# Patient Record
Sex: FEMALE | Employment: OTHER | URBAN - METROPOLITAN AREA
[De-identification: names, ages, dates, MRNs, and addresses within clinical notes are randomized per-mention and may not be internally consistent; named-entity substitution may affect disease eponyms.]

---

## 2017-01-17 ENCOUNTER — HOSPITAL ENCOUNTER (OUTPATIENT)
Dept: LAB | Age: 82
Discharge: HOME | End: 2017-01-17

## 2017-01-17 LAB
APPEARANCE UR: ABNORMAL
BILIRUB UR QL: ABNORMAL
COLOR UR: ABNORMAL
GLUCOSE UR STRIP.AUTO-MCNC: NEGATIVE MG/DL
HGB UR QL STRIP: NEGATIVE
KETONES UR QL STRIP.AUTO: ABNORMAL MG/DL
LEUKOCYTE ESTERASE UR QL STRIP.AUTO: NEGATIVE
NITRITE UR QL STRIP.AUTO: NEGATIVE
PH UR STRIP: 5 [PH] (ref 5–9)
PROT UR STRIP-MCNC: ABNORMAL MG/DL
SP GR UR REFRACTOMETRY: 1.03 (ref 1–1.02)
UROBILINOGEN UR QL STRIP.AUTO: 1 EU/DL (ref 0.2–1)

## 2017-01-17 PROCEDURE — 81003 URINALYSIS AUTO W/O SCOPE: CPT | Performed by: INTERNAL MEDICINE

## 2017-01-30 ENCOUNTER — APPOINTMENT (OUTPATIENT)
Dept: GENERAL RADIOLOGY | Age: 82
End: 2017-01-30
Attending: EMERGENCY MEDICINE
Payer: MEDICARE

## 2017-01-30 ENCOUNTER — HOSPITAL ENCOUNTER (OUTPATIENT)
Dept: ULTRASOUND IMAGING | Age: 82
Discharge: HOME | End: 2017-01-30
Attending: INTERNAL MEDICINE
Payer: MEDICARE

## 2017-01-30 ENCOUNTER — HOSPITAL ENCOUNTER (EMERGENCY)
Age: 82
Discharge: HOME | End: 2017-01-30
Attending: EMERGENCY MEDICINE
Payer: MEDICARE

## 2017-01-30 VITALS
TEMPERATURE: 98 F | HEIGHT: 64 IN | SYSTOLIC BLOOD PRESSURE: 159 MMHG | RESPIRATION RATE: 16 BRPM | HEART RATE: 97 BPM | WEIGHT: 136 LBS | BODY MASS INDEX: 23.22 KG/M2 | OXYGEN SATURATION: 95 % | DIASTOLIC BLOOD PRESSURE: 73 MMHG

## 2017-01-30 DIAGNOSIS — R11.2 NAUSEA & VOMITING: ICD-10-CM

## 2017-01-30 DIAGNOSIS — N39.0 URINARY TRACT INFECTION, SITE UNSPECIFIED: ICD-10-CM

## 2017-01-30 DIAGNOSIS — R11.2 NAUSEA AND VOMITING, INTRACTABILITY OF VOMITING NOT SPECIFIED, UNSPECIFIED VOMITING TYPE: ICD-10-CM

## 2017-01-30 DIAGNOSIS — R10.9 UNSPECIFIED ABDOMINAL PAIN: ICD-10-CM

## 2017-01-30 DIAGNOSIS — R53.1 GENERALIZED WEAKNESS: Primary | ICD-10-CM

## 2017-01-30 LAB
ALBUMIN SERPL BCP-MCNC: 2.8 G/DL (ref 3.2–4.6)
ALBUMIN/GLOB SERPL: 0.7 {RATIO} (ref 1.2–3.5)
ALP SERPL-CCNC: 78 U/L (ref 50–136)
ALT SERPL-CCNC: 9 U/L (ref 12–65)
ANION GAP BLD CALC-SCNC: 12 MMOL/L (ref 7–16)
APPEARANCE UR: ABNORMAL
AST SERPL W P-5'-P-CCNC: 7 U/L (ref 15–37)
ATRIAL RATE: 93 BPM
BACTERIA URNS QL MICRO: ABNORMAL /HPF
BASOPHILS # BLD AUTO: 0 K/UL (ref 0–0.2)
BASOPHILS # BLD: 0 % (ref 0–2)
BILIRUB SERPL-MCNC: 0.6 MG/DL (ref 0.2–1.1)
BILIRUB UR QL: ABNORMAL
BNP SERPL-MCNC: 34 PG/ML
BUN SERPL-MCNC: 20 MG/DL (ref 8–23)
CALCIUM SERPL-MCNC: 8.4 MG/DL (ref 8.3–10.4)
CALCULATED P AXIS, ECG09: 75 DEGREES
CALCULATED R AXIS, ECG10: -46 DEGREES
CALCULATED T AXIS, ECG11: 31 DEGREES
CASTS URNS QL MICRO: ABNORMAL /LPF
CHLORIDE SERPL-SCNC: 99 MMOL/L (ref 98–107)
CO2 SERPL-SCNC: 27 MMOL/L (ref 21–32)
COLOR UR: ABNORMAL
CREAT SERPL-MCNC: 1.05 MG/DL (ref 0.6–1)
DIAGNOSIS, 93000: NORMAL
DIASTOLIC BP, ECG02: NORMAL MMHG
DIFFERENTIAL METHOD BLD: ABNORMAL
EOSINOPHIL # BLD: 0.1 K/UL (ref 0–0.8)
EOSINOPHIL NFR BLD: 1 % (ref 0.5–7.8)
EPI CELLS #/AREA URNS HPF: 0 /HPF
ERYTHROCYTE [DISTWIDTH] IN BLOOD BY AUTOMATED COUNT: 12.9 % (ref 11.9–14.6)
GLOBULIN SER CALC-MCNC: 3.8 G/DL (ref 2.3–3.5)
GLUCOSE SERPL-MCNC: 157 MG/DL (ref 65–100)
GLUCOSE UR STRIP.AUTO-MCNC: NEGATIVE MG/DL
HCT VFR BLD AUTO: 41.1 % (ref 35.8–46.3)
HGB BLD-MCNC: 13.8 G/DL (ref 11.7–15.4)
HGB UR QL STRIP: ABNORMAL
IMM GRANULOCYTES # BLD: 0.1 K/UL (ref 0–0.5)
IMM GRANULOCYTES NFR BLD AUTO: 0.5 % (ref 0–5)
KETONES UR QL STRIP.AUTO: NEGATIVE MG/DL
LEUKOCYTE ESTERASE UR QL STRIP.AUTO: ABNORMAL
LYMPHOCYTES # BLD AUTO: 12 % (ref 13–44)
LYMPHOCYTES # BLD: 1.1 K/UL (ref 0.5–4.6)
MCH RBC QN AUTO: 31.6 PG (ref 26.1–32.9)
MCHC RBC AUTO-ENTMCNC: 33.6 G/DL (ref 31.4–35)
MCV RBC AUTO: 94.1 FL (ref 79.6–97.8)
MONOCYTES # BLD: 1.1 K/UL (ref 0.1–1.3)
MONOCYTES NFR BLD AUTO: 11 % (ref 4–12)
NEUTS SEG # BLD: 7.1 K/UL (ref 1.7–8.2)
NEUTS SEG NFR BLD AUTO: 76 % (ref 43–78)
NITRITE UR QL STRIP.AUTO: NEGATIVE
P-R INTERVAL, ECG05: 160 MS
PH UR STRIP: 5 [PH] (ref 5–9)
PLATELET # BLD AUTO: 293 K/UL (ref 150–450)
PMV BLD AUTO: 10.6 FL (ref 10.8–14.1)
POTASSIUM SERPL-SCNC: 3.2 MMOL/L (ref 3.5–5.1)
PROT SERPL-MCNC: 6.6 G/DL (ref 6.3–8.2)
PROT UR STRIP-MCNC: 30 MG/DL
Q-T INTERVAL, ECG07: 326 MS
QRS DURATION, ECG06: 68 MS
QTC CALCULATION (BEZET), ECG08: 405 MS
RBC # BLD AUTO: 4.37 M/UL (ref 4.05–5.25)
RBC #/AREA URNS HPF: ABNORMAL /HPF
SODIUM SERPL-SCNC: 138 MMOL/L (ref 136–145)
SP GR UR REFRACTOMETRY: 1.02 (ref 1–1.02)
SYSTOLIC BP, ECG01: NORMAL MMHG
TROPONIN I SERPL-MCNC: 0.03 NG/ML (ref 0.02–0.05)
TSH SERPL DL<=0.005 MIU/L-ACNC: 1.83 UIU/ML (ref 0.36–3.74)
UROBILINOGEN UR QL STRIP.AUTO: 0.2 EU/DL (ref 0.2–1)
VENTRICULAR RATE, ECG03: 93 BPM
WBC # BLD AUTO: 9.4 K/UL (ref 4.3–11.1)
WBC URNS QL MICRO: >100 /HPF

## 2017-01-30 PROCEDURE — 96361 HYDRATE IV INFUSION ADD-ON: CPT | Performed by: EMERGENCY MEDICINE

## 2017-01-30 PROCEDURE — 87086 URINE CULTURE/COLONY COUNT: CPT | Performed by: EMERGENCY MEDICINE

## 2017-01-30 PROCEDURE — 80053 COMPREHEN METABOLIC PANEL: CPT | Performed by: EMERGENCY MEDICINE

## 2017-01-30 PROCEDURE — 84484 ASSAY OF TROPONIN QUANT: CPT | Performed by: EMERGENCY MEDICINE

## 2017-01-30 PROCEDURE — 81001 URINALYSIS AUTO W/SCOPE: CPT | Performed by: EMERGENCY MEDICINE

## 2017-01-30 PROCEDURE — 96365 THER/PROPH/DIAG IV INF INIT: CPT | Performed by: EMERGENCY MEDICINE

## 2017-01-30 PROCEDURE — 93005 ELECTROCARDIOGRAM TRACING: CPT | Performed by: EMERGENCY MEDICINE

## 2017-01-30 PROCEDURE — 74011250636 HC RX REV CODE- 250/636: Performed by: EMERGENCY MEDICINE

## 2017-01-30 PROCEDURE — 84443 ASSAY THYROID STIM HORMONE: CPT | Performed by: EMERGENCY MEDICINE

## 2017-01-30 PROCEDURE — 74011000258 HC RX REV CODE- 258: Performed by: EMERGENCY MEDICINE

## 2017-01-30 PROCEDURE — 96375 TX/PRO/DX INJ NEW DRUG ADDON: CPT | Performed by: EMERGENCY MEDICINE

## 2017-01-30 PROCEDURE — 85025 COMPLETE CBC W/AUTO DIFF WBC: CPT | Performed by: EMERGENCY MEDICINE

## 2017-01-30 PROCEDURE — 99284 EMERGENCY DEPT VISIT MOD MDM: CPT | Performed by: EMERGENCY MEDICINE

## 2017-01-30 PROCEDURE — 71020 XR CHEST PA LAT: CPT

## 2017-01-30 PROCEDURE — 83880 ASSAY OF NATRIURETIC PEPTIDE: CPT | Performed by: EMERGENCY MEDICINE

## 2017-01-30 RX ORDER — METOCLOPRAMIDE HYDROCHLORIDE 5 MG/ML
5 INJECTION INTRAMUSCULAR; INTRAVENOUS
Status: COMPLETED | OUTPATIENT
Start: 2017-01-30 | End: 2017-01-30

## 2017-01-30 RX ORDER — SODIUM CHLORIDE 9 MG/ML
150 INJECTION, SOLUTION INTRAVENOUS CONTINUOUS
Status: DISCONTINUED | OUTPATIENT
Start: 2017-01-30 | End: 2017-01-30 | Stop reason: HOSPADM

## 2017-01-30 RX ORDER — METOCLOPRAMIDE 5 MG/1
5 TABLET ORAL
Qty: 20 TAB | Refills: 0 | Status: SHIPPED | OUTPATIENT
Start: 2017-01-30 | End: 2021-01-13

## 2017-01-30 RX ORDER — NITROFURANTOIN 25; 75 MG/1; MG/1
100 CAPSULE ORAL 2 TIMES DAILY
Qty: 6 CAP | Refills: 0 | Status: SHIPPED | OUTPATIENT
Start: 2017-01-30 | End: 2017-02-02

## 2017-01-30 RX ADMIN — METOCLOPRAMIDE 5 MG: 5 INJECTION, SOLUTION INTRAMUSCULAR; INTRAVENOUS at 16:59

## 2017-01-30 RX ADMIN — CEFTRIAXONE SODIUM 1 G: 1 INJECTION, POWDER, FOR SOLUTION INTRAMUSCULAR; INTRAVENOUS at 16:59

## 2017-01-30 RX ADMIN — SODIUM CHLORIDE 150 ML/HR: 900 INJECTION, SOLUTION INTRAVENOUS at 14:20

## 2017-01-30 NOTE — ED PROVIDER NOTES
HPI Comments: Patient was brought to return for evaluation due to increasing generalized weakness and persistent nausea and vomiting. Patient is currently in a rehabilitation facility after recent hospitalization. She has been treated for urinary tract infection the past.  And also been seen by gastroenterology for the nausea and vomiting and possible gallbladder issues. She had a gallbladder ultrasound performed today as an outpatient but due to her symptoms was brought to the emergency department. Patient is a 80 y.o. female presenting with vomiting. The history is provided by the patient and a relative. Vomiting    This is a recurrent problem. The current episode started more than 1 week ago. The problem occurs 2 to 4 times per day. The problem has been gradually worsening. The emesis has an appearance of stomach contents. There has been no fever. Pertinent negatives include no chills, no fever, no sweats, no abdominal pain, no diarrhea, no headaches, no arthralgias, no myalgias, no cough, no URI and no headaches. Risk factors include new medication. Her pertinent negatives include no irritable bowel syndrome, no inflammatory bowel disease, no short gut syndrome, no bowel resection, no recent abdominal surgery, no malabsorption, no gastric bypass and no DM.         Past Medical History:   Diagnosis Date    Arrhythmia      by history/ not at present    CAD (coronary artery disease)      mitral valve prolapse    Chronic pain     Gastrointestinal disorder      GERD    Gastrointestinal disorder      gallstones    GERD (gastroesophageal reflux disease)        Past Surgical History:   Procedure Laterality Date    Hx hysterectomy      Pr breast surgery procedure unlisted       lumpectomy on RT breast         Family History:   Problem Relation Age of Onset    Diabetes Mother     Hypertension Mother     Breast Cancer Neg Hx        Social History     Social History    Marital status:      Spouse name: N/A    Number of children: N/A    Years of education: N/A     Occupational History    Not on file. Social History Main Topics    Smoking status: Never Smoker    Smokeless tobacco: Not on file    Alcohol use No    Drug use: No    Sexual activity: Not Currently     Other Topics Concern    Not on file     Social History Narrative         ALLERGIES: Aspirin and Pcn [penicillins]    Review of Systems   Constitutional: Negative for chills and fever. Respiratory: Negative for cough. Gastrointestinal: Positive for vomiting. Negative for abdominal pain and diarrhea. Musculoskeletal: Negative for arthralgias and myalgias. Neurological: Negative for headaches. All other systems reviewed and are negative. Vitals:    01/30/17 1229 01/30/17 1432 01/30/17 1434   BP: 131/86 134/65 129/68   Pulse: 98 91 96   Resp: 18     Temp: 98.2 °F (36.8 °C)     SpO2: 96% 97% 95%   Weight: 61.7 kg (136 lb)     Height: 5' 4\" (1.626 m)              Physical Exam   Constitutional: She is oriented to person, place, and time. She appears well-developed and well-nourished. HENT:   Head: Normocephalic and atraumatic. Eyes: Conjunctivae and EOM are normal. Pupils are equal, round, and reactive to light. Neck: Normal range of motion. Neck supple. Cardiovascular: Normal rate, regular rhythm, normal heart sounds and intact distal pulses. Pulmonary/Chest: Effort normal and breath sounds normal.   Abdominal: Soft. Bowel sounds are normal.   Musculoskeletal: Normal range of motion. She exhibits no edema, tenderness or deformity. Neurological: She is alert and oriented to person, place, and time. Skin: Skin is warm and dry. Psychiatric: Her behavior is normal.   Patient appears depressed   Nursing note and vitals reviewed.        MDM  Number of Diagnoses or Management Options  Generalized weakness:   Nausea and vomiting, intractability of vomiting not specified, unspecified vomiting type:   Urinary tract infection, site unspecified:   Diagnosis management comments: Differential diagnosis of this patient's generalized weakness includes dehydration possibly thyroid disease possibly depression or another metabolic issue. The EKG and troponin will be obtained to rule out myocardial infarction as well as a urinalysis       Amount and/or Complexity of Data Reviewed  Clinical lab tests: ordered and reviewed  Tests in the radiology section of CPT®: ordered and reviewed  Independent visualization of images, tracings, or specimens: yes (EKG at 1439 hrs.  Times it's a normal sinus rhythm with a rate of 93 left axis deviation no acute ischemic changes are noted)    Risk of Complications, Morbidity, and/or Mortality  Presenting problems: high  Diagnostic procedures: high  Management options: moderate      ED Course       Procedures

## 2017-01-30 NOTE — DISCHARGE INSTRUCTIONS
Nausea and Vomiting: Care Instructions  Your Care Instructions    When you are nauseated, you may feel weak and sweaty and notice a lot of saliva in your mouth. Nausea often leads to vomiting. Most of the time you do not need to worry about nausea and vomiting, but they can be signs of other illnesses. Two common causes of nausea and vomiting are stomach flu and food poisoning. Nausea and vomiting from viral stomach flu will usually start to improve within 24 hours. Nausea and vomiting from food poisoning may last from 12 to 48 hours. The doctor has checked you carefully, but problems can develop later. If you notice any problems or new symptoms, get medical treatment right away. Follow-up care is a key part of your treatment and safety. Be sure to make and go to all appointments, and call your doctor if you are having problems. It's also a good idea to know your test results and keep a list of the medicines you take. How can you care for yourself at home? · To prevent dehydration, drink plenty of fluids, enough so that your urine is light yellow or clear like water. Choose water and other caffeine-free clear liquids until you feel better. If you have kidney, heart, or liver disease and have to limit fluids, talk with your doctor before you increase the amount of fluids you drink. · Rest in bed until you feel better. · When you are able to eat, try clear soups, mild foods, and liquids until all symptoms are gone for 12 to 48 hours. Other good choices include dry toast, crackers, cooked cereal, and gelatin dessert, such as Jell-O. When should you call for help? Call 911 anytime you think you may need emergency care. For example, call if:  · You passed out (lost consciousness). Call your doctor now or seek immediate medical care if:  · You have symptoms of dehydration, such as:  ¨ Dry eyes and a dry mouth. ¨ Passing only a little dark urine.   ¨ Feeling thirstier than usual.  · You have new or worsening belly pain. · You have a new or higher fever. · You vomit blood or what looks like coffee grounds. Watch closely for changes in your health, and be sure to contact your doctor if:  · You have ongoing nausea and vomiting. · Your vomiting is getting worse. · Your vomiting lasts longer than 2 days. · You are not getting better as expected. Where can you learn more? Go to http://art-elmo.info/. Enter 25 [id number] in the search box to learn more about \"Nausea and Vomiting: Care Instructions. \"  Current as of: May 27, 2016  Content Version: 11.1  © 7717-8935 [employer]. Care instructions adapted under license by [employer] (which disclaims liability or warranty for this information). If you have questions about a medical condition or this instruction, always ask your healthcare professional. [address] any warranty or liability for your use of this information. Urinary Tract Infection in Women: Care Instructions  Your Care Instructions    A urinary tract infection, or UTI, is a general term for an infection anywhere between the kidneys and the urethra (where urine comes out). Most UTIs are bladder infections. They often cause pain or burning when you urinate. UTIs are caused by bacteria and can be cured with antibiotics. Be sure to complete your treatment so that the infection goes away. Follow-up care is a key part of your treatment and safety. Be sure to make and go to all appointments, and call your doctor if you are having problems. It's also a good idea to know your test results and keep a list of the medicines you take. How can you care for yourself at home? · Take your antibiotics as directed. Do not stop taking them just because you feel better. You need to take the full course of antibiotics. · Drink extra water and other fluids for the next day or two. This may help wash out the bacteria that are causing the infection.  (If you have kidney, heart, or liver disease and have to limit fluids, talk with your doctor before you increase your fluid intake.)  · Avoid drinks that are carbonated or have caffeine. They can irritate the bladder. · Urinate often. Try to empty your bladder each time. · To relieve pain, take a hot bath or lay a heating pad set on low over your lower belly or genital area. Never go to sleep with a heating pad in place. To prevent UTIs  · Drink plenty of water each day. This helps you urinate often, which clears bacteria from your system. (If you have kidney, heart, or liver disease and have to limit fluids, talk with your doctor before you increase your fluid intake.)  · Consider adding cranberry juice to your diet. · Urinate when you need to. · Urinate right after you have sex. · Change sanitary pads often. · Avoid douches, bubble baths, feminine hygiene sprays, and other feminine hygiene products that have deodorants. · After going to the bathroom, wipe from front to back. When should you call for help? Call your doctor now or seek immediate medical care if:  · Symptoms such as fever, chills, nausea, or vomiting get worse or appear for the first time. · You have new pain in your back just below your rib cage. This is called flank pain. · There is new blood or pus in your urine. · You have any problems with your antibiotic medicine. Watch closely for changes in your health, and be sure to contact your doctor if:  · You are not getting better after taking an antibiotic for 2 days. · Your symptoms go away but then come back. Where can you learn more? Go to http://art-elmo.info/. Enter V374 in the search box to learn more about \"Urinary Tract Infection in Women: Care Instructions. \"  Current as of: August 12, 2016  Content Version: 11.1  © 8169-9295 [employer].  Care instructions adapted under license by [employer] (which disclaims liability or warranty for this information). If you have questions about a medical condition or this instruction, always ask your healthcare professional. [address] any warranty or liability for your use of this information. Weakness: Care Instructions  Your Care Instructions  Weakness is a lack of physical or muscle strength. You may feel that you need to make extra effort to move your arms, legs, or other muscles. Generalized weakness means that you feel weak in most areas of your body. Another type of weakness may affect just one muscle or group of muscles. You may feel weak and tired after you have done too much activity, such as taking an extra-long hike. This is not a serious problem. It often goes away on its own. Feeling weak can also be caused by medical conditions like thyroid problems, depression, or a virus. Sometimes the cause can be serious. Your doctor may want to do more tests to try to find the cause of the weakness. The doctor has checked you carefully, but problems can develop later. If you notice any problems or new symptoms, get medical treatment right away. Follow-up care is a key part of your treatment and safety. Be sure to make and go to all appointments, and call your doctor if you are having problems. It's also a good idea to know your test results and keep a list of the medicines you take. How can you care for yourself at home? · Rest when you feel tired. · Be safe with medicines. If your doctor prescribed medicine, take it exactly as prescribed. Call your doctor if you think you are having a problem with your medicine. You will get more details on the specific medicines your doctor prescribes. · Do not skip meals. Eating a balanced diet may increase your energy level. · Get some physical activity every day, but do not get too tired. When should you call for help? Call your doctor now or seek immediate medical care if:  · You have new or worse weakness.   · You are dizzy or lightheaded, or you feel like you may faint. Watch closely for changes in your health, and be sure to contact your doctor if:  · You do not get better as expected. Where can you learn more? Go to http://art-elmo.info/. Enter [id number] [id number] in the search box to learn more about \"Weakness: Care Instructions. \"  Current as of: May 27, 2016  Content Version: 11.1  © 3111-2697 [employer]. Care instructions adapted under license by [employer] (which disclaims liability or warranty for this information). If you have questions about a medical condition or this instruction, always ask your healthcare professional. [address] any warranty or liability for your use of this information.

## 2017-01-30 NOTE — ED NOTES
Pt. States nausea improved after Reglan, able to complete PO challenge without nausea/vomiting. I have reviewed discharge instructions with the caregiver. The caregiver verbalized understanding. Transport arranged for transport back to assisted living.

## 2017-02-01 ENCOUNTER — PATIENT OUTREACH (OUTPATIENT)
Dept: CASE MANAGEMENT | Age: 82
End: 2017-02-01

## 2017-02-01 LAB
BACTERIA SPEC CULT: NORMAL
BACTERIA SPEC CULT: NORMAL
SERVICE CMNT-IMP: NORMAL

## 2017-02-01 NOTE — PROGRESS NOTES
This note will not be viewable in [address] Patient ineligible at this time for [facility] program.    Patient currently at rehab facility. Will attempt outreach in 21 days.

## 2017-02-05 ENCOUNTER — APPOINTMENT (OUTPATIENT)
Dept: GENERAL RADIOLOGY | Age: 82
DRG: 871 | End: 2017-02-05
Attending: EMERGENCY MEDICINE
Payer: MEDICARE

## 2017-02-05 ENCOUNTER — HOSPITAL ENCOUNTER (INPATIENT)
Age: 82
LOS: 12 days | Discharge: HOSPICE | DRG: 871 | End: 2017-02-17
Attending: EMERGENCY MEDICINE | Admitting: INTERNAL MEDICINE
Payer: MEDICARE

## 2017-02-05 DIAGNOSIS — I48.91 ATRIAL FIBRILLATION WITH RVR (HCC): ICD-10-CM

## 2017-02-05 DIAGNOSIS — R91.8 PULMONARY INFILTRATE: ICD-10-CM

## 2017-02-05 DIAGNOSIS — J96.00 ACUTE RESPIRATORY FAILURE, UNSPECIFIED WHETHER WITH HYPOXIA OR HYPERCAPNIA (HCC): ICD-10-CM

## 2017-02-05 DIAGNOSIS — I27.20 PULMONARY HYPERTENSION (HCC): ICD-10-CM

## 2017-02-05 DIAGNOSIS — R11.2 NAUSEA AND VOMITING, INTRACTABILITY OF VOMITING NOT SPECIFIED, UNSPECIFIED VOMITING TYPE: ICD-10-CM

## 2017-02-05 DIAGNOSIS — R60.0 BILATERAL LEG EDEMA: ICD-10-CM

## 2017-02-05 DIAGNOSIS — A41.9 SEPTIC SHOCK (HCC): Primary | ICD-10-CM

## 2017-02-05 DIAGNOSIS — R65.21 SEPTIC SHOCK (HCC): Primary | ICD-10-CM

## 2017-02-05 DIAGNOSIS — N17.9 ACUTE KIDNEY INJURY (HCC): ICD-10-CM

## 2017-02-05 DIAGNOSIS — K20.90 ESOPHAGITIS DETERMINED BY ENDOSCOPY: ICD-10-CM

## 2017-02-05 DIAGNOSIS — R11.2 NON-INTRACTABLE VOMITING WITH NAUSEA, UNSPECIFIED VOMITING TYPE: ICD-10-CM

## 2017-02-05 DIAGNOSIS — R53.81 DEBILITY: Chronic | ICD-10-CM

## 2017-02-05 LAB
ALBUMIN SERPL BCP-MCNC: 2.7 G/DL (ref 3.2–4.6)
ALBUMIN/GLOB SERPL: 0.7 {RATIO} (ref 1.2–3.5)
ALP SERPL-CCNC: 72 U/L (ref 50–136)
ALT SERPL-CCNC: 13 U/L (ref 12–65)
ANION GAP BLD CALC-SCNC: 13 MMOL/L (ref 7–16)
APPEARANCE UR: ABNORMAL
ARTERIAL PATENCY WRIST A: POSITIVE
AST SERPL W P-5'-P-CCNC: 23 U/L (ref 15–37)
BASE DEFICIT BLDA-SCNC: 3.7 MMOL/L (ref 0–2)
BASOPHILS # BLD AUTO: 0 K/UL (ref 0–0.2)
BASOPHILS # BLD: 0 % (ref 0–2)
BDY SITE: ABNORMAL
BILIRUB SERPL-MCNC: 0.6 MG/DL (ref 0.2–1.1)
BILIRUB UR QL: ABNORMAL
BUN SERPL-MCNC: 32 MG/DL (ref 8–23)
CALCIUM SERPL-MCNC: 8.8 MG/DL (ref 8.3–10.4)
CHLORIDE SERPL-SCNC: 99 MMOL/L (ref 98–107)
CO2 SERPL-SCNC: 25 MMOL/L (ref 21–32)
COHGB MFR BLD: 0.3 % (ref 0.5–1.5)
COLOR UR: ABNORMAL
CREAT SERPL-MCNC: 2.12 MG/DL (ref 0.6–1)
DIFFERENTIAL METHOD BLD: ABNORMAL
DO-HGB BLD-MCNC: 1 % (ref 0–5)
EOSINOPHIL # BLD: 0 K/UL (ref 0–0.8)
EOSINOPHIL NFR BLD: 0 % (ref 0.5–7.8)
ERYTHROCYTE [DISTWIDTH] IN BLOOD BY AUTOMATED COUNT: 13 % (ref 11.9–14.6)
FIO2 ON VENT: 100 %
GAS FLOW.O2 O2 DELIVERY SYS: 10 L/MIN
GLOBULIN SER CALC-MCNC: 3.8 G/DL (ref 2.3–3.5)
GLUCOSE SERPL-MCNC: 186 MG/DL (ref 65–100)
GLUCOSE UR STRIP.AUTO-MCNC: NEGATIVE MG/DL
HCO3 BLDA-SCNC: 18 MMOL/L (ref 22–26)
HCT VFR BLD AUTO: 37.4 % (ref 35.8–46.3)
HGB BLD-MCNC: 12.7 G/DL (ref 11.7–15.4)
HGB BLDMV-MCNC: 12 GM/DL (ref 11.7–15)
HGB UR QL STRIP: NEGATIVE
IMM GRANULOCYTES # BLD: 0.1 K/UL (ref 0–0.5)
IMM GRANULOCYTES NFR BLD AUTO: 0.5 % (ref 0–5)
KETONES UR QL STRIP.AUTO: NEGATIVE MG/DL
LACTATE BLD-SCNC: 1.9 MMOL/L (ref 0.5–1.9)
LEUKOCYTE ESTERASE UR QL STRIP.AUTO: NEGATIVE
LYMPHOCYTES # BLD AUTO: 12 % (ref 13–44)
LYMPHOCYTES # BLD: 1.8 K/UL (ref 0.5–4.6)
MCH RBC QN AUTO: 31.9 PG (ref 26.1–32.9)
MCHC RBC AUTO-ENTMCNC: 34 G/DL (ref 31.4–35)
MCV RBC AUTO: 94 FL (ref 79.6–97.8)
METHGB MFR BLD: 0.6 % (ref 0–1.5)
MONOCYTES # BLD: 1.1 K/UL (ref 0.1–1.3)
MONOCYTES NFR BLD AUTO: 7 % (ref 4–12)
NEUTS SEG # BLD: 12.3 K/UL (ref 1.7–8.2)
NEUTS SEG NFR BLD AUTO: 81 % (ref 43–78)
NITRITE UR QL STRIP.AUTO: NEGATIVE
OXYHGB MFR BLDA: 98.5 % (ref 94–97)
PCO2 BLDA: 24 MMHG (ref 35–45)
PH BLDA: 7.5 [PH] (ref 7.35–7.45)
PH UR STRIP: 6 [PH] (ref 5–9)
PLATELET # BLD AUTO: 351 K/UL (ref 150–450)
PMV BLD AUTO: 10.7 FL (ref 10.8–14.1)
PO2 BLDA: 221 MMHG (ref 75–100)
POTASSIUM SERPL-SCNC: 3.5 MMOL/L (ref 3.5–5.1)
PROCALCITONIN SERPL-MCNC: 0.2 NG/ML
PROT SERPL-MCNC: 6.5 G/DL (ref 6.3–8.2)
PROT UR STRIP-MCNC: NEGATIVE MG/DL
RBC # BLD AUTO: 3.98 M/UL (ref 4.05–5.25)
SAO2 % BLD: 99 % (ref 92–98.5)
SERVICE CMNT-IMP: ABNORMAL
SODIUM SERPL-SCNC: 137 MMOL/L (ref 136–145)
SP GR UR REFRACTOMETRY: 1.02 (ref 1–1.02)
TROPONIN I BLD-MCNC: 0.48 NG/ML (ref 0–0.08)
UROBILINOGEN UR QL STRIP.AUTO: 0.2 EU/DL (ref 0.2–1)
VENTILATION MODE VENT: ABNORMAL
WBC # BLD AUTO: 15.3 K/UL (ref 4.3–11.1)

## 2017-02-05 PROCEDURE — 87086 URINE CULTURE/COLONY COUNT: CPT | Performed by: EMERGENCY MEDICINE

## 2017-02-05 PROCEDURE — 93005 ELECTROCARDIOGRAM TRACING: CPT | Performed by: EMERGENCY MEDICINE

## 2017-02-05 PROCEDURE — 84484 ASSAY OF TROPONIN QUANT: CPT

## 2017-02-05 PROCEDURE — 65610000006 HC RM INTENSIVE CARE

## 2017-02-05 PROCEDURE — 36600 WITHDRAWAL OF ARTERIAL BLOOD: CPT

## 2017-02-05 PROCEDURE — 74011000258 HC RX REV CODE- 258: Performed by: EMERGENCY MEDICINE

## 2017-02-05 PROCEDURE — 83605 ASSAY OF LACTIC ACID: CPT

## 2017-02-05 PROCEDURE — 96365 THER/PROPH/DIAG IV INF INIT: CPT | Performed by: EMERGENCY MEDICINE

## 2017-02-05 PROCEDURE — 74011000250 HC RX REV CODE- 250: Performed by: EMERGENCY MEDICINE

## 2017-02-05 PROCEDURE — 84145 PROCALCITONIN (PCT): CPT | Performed by: EMERGENCY MEDICINE

## 2017-02-05 PROCEDURE — 81003 URINALYSIS AUTO W/O SCOPE: CPT | Performed by: EMERGENCY MEDICINE

## 2017-02-05 PROCEDURE — 75810000137 HC PLCMT CENT VENOUS CATH

## 2017-02-05 PROCEDURE — 99285 EMERGENCY DEPT VISIT HI MDM: CPT

## 2017-02-05 PROCEDURE — 96365 THER/PROPH/DIAG IV INF INIT: CPT

## 2017-02-05 PROCEDURE — 87040 BLOOD CULTURE FOR BACTERIA: CPT | Performed by: EMERGENCY MEDICINE

## 2017-02-05 PROCEDURE — 75810000137 HC PLCMT CENT VENOUS CATH: Performed by: EMERGENCY MEDICINE

## 2017-02-05 PROCEDURE — 71010 XR CHEST PORT: CPT

## 2017-02-05 PROCEDURE — 74011250636 HC RX REV CODE- 250/636: Performed by: INTERNAL MEDICINE

## 2017-02-05 PROCEDURE — 99285 EMERGENCY DEPT VISIT HI MDM: CPT | Performed by: EMERGENCY MEDICINE

## 2017-02-05 PROCEDURE — 74011250636 HC RX REV CODE- 250/636: Performed by: EMERGENCY MEDICINE

## 2017-02-05 PROCEDURE — 99291 CRITICAL CARE FIRST HOUR: CPT | Performed by: INTERNAL MEDICINE

## 2017-02-05 PROCEDURE — 80053 COMPREHEN METABOLIC PANEL: CPT | Performed by: EMERGENCY MEDICINE

## 2017-02-05 PROCEDURE — 96367 TX/PROPH/DG ADDL SEQ IV INF: CPT

## 2017-02-05 PROCEDURE — 36415 COLL VENOUS BLD VENIPUNCTURE: CPT | Performed by: EMERGENCY MEDICINE

## 2017-02-05 PROCEDURE — 82803 BLOOD GASES ANY COMBINATION: CPT

## 2017-02-05 PROCEDURE — C1751 CATH, INF, PER/CENT/MIDLINE: HCPCS | Performed by: EMERGENCY MEDICINE

## 2017-02-05 PROCEDURE — 85025 COMPLETE CBC W/AUTO DIFF WBC: CPT | Performed by: EMERGENCY MEDICINE

## 2017-02-05 RX ORDER — SODIUM CHLORIDE 0.9 % (FLUSH) 0.9 %
5-10 SYRINGE (ML) INJECTION AS NEEDED
Status: DISCONTINUED | OUTPATIENT
Start: 2017-02-05 | End: 2017-02-17 | Stop reason: HOSPADM

## 2017-02-05 RX ORDER — SODIUM CHLORIDE 9 MG/ML
75 INJECTION, SOLUTION INTRAVENOUS CONTINUOUS
Status: DISPENSED | OUTPATIENT
Start: 2017-02-05 | End: 2017-02-06

## 2017-02-05 RX ORDER — FUROSEMIDE 20 MG/1
20 TABLET ORAL DAILY
COMMUNITY
End: 2021-01-13

## 2017-02-05 RX ORDER — POTASSIUM CHLORIDE 750 MG/1
10 TABLET, FILM COATED, EXTENDED RELEASE ORAL DAILY
COMMUNITY
End: 2021-01-13

## 2017-02-05 RX ORDER — METOPROLOL SUCCINATE 25 MG/1
25 TABLET, EXTENDED RELEASE ORAL DAILY
COMMUNITY
End: 2017-02-17

## 2017-02-05 RX ORDER — PANTOPRAZOLE SODIUM 40 MG/1
40 TABLET, DELAYED RELEASE ORAL DAILY
COMMUNITY
End: 2017-02-17

## 2017-02-05 RX ORDER — SUCRALFATE 1 G/10ML
1 SUSPENSION ORAL
COMMUNITY
End: 2021-01-13

## 2017-02-05 RX ADMIN — SODIUM CHLORIDE 1893 ML: 900 INJECTION, SOLUTION INTRAVENOUS at 21:49

## 2017-02-05 RX ADMIN — SODIUM CHLORIDE 100 ML/HR: 900 INJECTION, SOLUTION INTRAVENOUS at 23:24

## 2017-02-05 RX ADMIN — NOREPINEPHRINE BITARTRATE 4 MCG/MIN: 1 INJECTION INTRAVENOUS at 21:47

## 2017-02-05 RX ADMIN — CEFTRIAXONE 1 G: 1 INJECTION, POWDER, FOR SOLUTION INTRAMUSCULAR; INTRAVENOUS at 23:24

## 2017-02-05 NOTE — IP AVS SNAPSHOT
Dayami Tsai 
 
 
 [address] 
[phone] Patient: Jonelle Lam MRN: [id number] BEM:1/2/6020 You are allergic to the following Allergen Reactions Aspirin Nausea and Vomiting Pcn (Penicillins) Itching Immunizations Administered for This Admission Name Date Influenza Vaccine (Quad) PF  Deferred () Recent Documentation Height Weight Breastfeeding? BMI OB Status Smoking Status 1.6 m 57 kg No 22.27 kg/m2 Hysterectomy Never Smoker Emergency Contacts Name Discharge Info Relation Home Work Mobile [address] [employer] CAREGIVER [3] Son [22] [phone] About your hospitalization You were admitted on:  February 5, 2017 You last received care in the:  [facility] 8 MED SURG You were discharged on:  February 17, 2017 Unit phone number:  [phone] Why you were hospitalized Your primary diagnosis was:  Septic Shock (Hcc) Your diagnoses also included:  Nausea And Vomiting, Acute Kidney Injury (Hcc), Pulmonary Infiltrate, Bilateral Leg Edema, Esophagitis Determined By Endoscopy, Debility, Atrial Fibrillation With Rvr (Hcc), Pulmonary Hypertension (Hcc), Acute Respiratory Failure (Hcc) Providers Seen During Your Hospitalizations Provider Role Specialty Primary office phone Cadence Mcdonough MD Attending Provider Emergency Medicine [phone] Jesusita Judd MD Attending Provider Pulmonary Disease [phone] Your Primary Care Physician (PCP) Primary Care Physician Office Phone Office Fax Charis Kerri [phone] Follow-up Information Follow up With Details Comments Contact Info Ike Tobias MD   [address] [address] 
[phone] Your Appointments Friday February 17, 2017  2:30 PM EST Office Visit with Ike Tobias MD  
 [address] [place] [address] ([address] [place] [address]) [address] [facility] diaz [address] [place] [address]  
[phone] Current Discharge Medication List  
  
START taking these medications Dose & Instructions Dispensing Information Comments Morning Noon Evening Bedtime  
 busPIRone 5 mg tablet Commonly known as:  BUSPAR Your next dose is: Today Dose:  5 mg Take 1 Tab by mouth two (2) times a day for 30 days. Quantity:  60 Tab Refills:  0  
     
  
   
   
  
   
  
 diazePAM 2 mg tablet Commonly known as:  VALIUM Your next dose is:  Tomorrow Dose:  2 mg Take 1 Tab by mouth two (2) times a day for 30 days. Max Daily Amount: 4 mg. Quantity:  60 Tab Refills:  0  
     
  
   
   
   
  
  
 dicyclomine 10 mg capsule Commonly known as:  BENTYL Your next dose is: Today Dose:  10 mg Take 1 Cap by mouth Before breakfast, lunch, and dinner for 30 days. Quantity:  90 Cap Refills:  0  
     
  
   
  
   
  
   
  
 digoxin 0.125 mg tablet Commonly known as:  LANOXIN Your next dose is:  Tomorrow Dose:  0.125 mg Take 1 Tab by mouth daily for 30 days. Quantity:  30 Tab Refills:  0  
     
  
   
   
   
  
 dilTIAZem  mg ER capsule Commonly known as:  CARDIZEM CD Your next dose is:  Tomorrow Dose:  120 mg Take 1 Cap by mouth daily for 30 days. Quantity:  30 Cap Refills:  1 HYDROcodone-homatropine 5-1.5 mg/5 mL (5 mL) syrup Commonly known as:  HYCODAN Dose:  5 mL Take 5 mL by mouth every four (4) hours as needed for Cough. Max Daily Amount: 30 mL. Quantity:  240 mL Refills:  1  
     
   
   
   
  
 hydrocortisone 2.5 % rectal cream  
Commonly known as:  ANUSOL-HC Your next dose is: Today Dose:  1 g Insert 1 g into rectum two (2) times a day. Quantity:  30 g Refills:  0  
     
  
   
   
  
   
  
 ondansetron 8 mg disintegrating tablet Commonly known as:  ZOFRAN ODT Your next dose is: Today Dose:  8 mg Take 1 Tab by mouth every eight (8) hours for 30 days. Quantity:  90 Tab Refills:  0  
     
   
   
  
   
  
 spironolactone 25 mg tablet Commonly known as:  ALDACTONE Your next dose is: Today Dose:  25 mg Take 1 Tab by mouth two (2) times a day for 30 days. Quantity:  60 Tab Refills:  0 CONTINUE these medications which have CHANGED Dose & Instructions Dispensing Information Comments Morning Noon Evening Bedtime * CARAFATE 100 mg/mL suspension Generic drug:  sucralfate What changed:  Another medication with the same name was added. Make sure you understand how and when to take each. Dose:  1 tsp Take 1 tsp by mouth four (4) times daily as needed. Refills:  0  
     
   
   
   
  
 * sucralfate 100 mg/mL suspension Commonly known as:  Mere Mcintyre What changed: You were already taking a medication with the same name, and this prescription was added. Make sure you understand how and when to take each. Your next dose is: Today Dose:  1 g Take 10 mL by mouth Before breakfast, lunch, dinner and at bedtime. Quantity:  414 mL Refills:  1  
     
  
   
  
   
  
   
  
  
 pantoprazole 40 mg tablet Commonly known as:  PROTONIX What changed:  when to take this Your next dose is: Today Dose:  40 mg Take 1 Tab by mouth Before breakfast and dinner for 30 days. Quantity:  60 Tab Refills:  0  
     
  
   
   
  
   
  
 * Notice: This list has 2 medication(s) that are the same as other medications prescribed for you. Read the directions carefully, and ask your doctor or other care provider to review them with you. CONTINUE these medications which have NOT CHANGED Dose & Instructions Dispensing Information Comments Morning Noon Evening Bedtime LASIX 20 mg tablet Generic drug:  furosemide Your next dose is:  Tomorrow Dose:  20 mg Take 20 mg by mouth daily. Refills:  0  
     
  
   
   
   
  
 metoclopramide HCl 5 mg tablet Commonly known as:  REGLAN Dose:  5 mg Take 1 Tab by mouth every six (6) hours as needed for Nausea. Quantity:  20 Tab Refills:  0  
     
   
   
   
  
 potassium chloride SR 10 mEq tablet Commonly known as:  KLOR-CON 10 Your next dose is:  Tomorrow Dose:  10 mEq Take 10 mEq by mouth daily. Refills:  0 STOP taking these medications   
 metoprolol succinate 25 mg XL tablet Commonly known as:  TOPROL-XL Where to Get Your Medications These medications were sent to [address] [place] [address], [address], [address] [facility] Phone:  [phone]  
  dilTIAZem  mg ER capsule  
 sucralfate 100 mg/mL suspension Information on where to get these meds will be given to you by the nurse or doctor. ! Ask your nurse or doctor about these medications  
  busPIRone 5 mg tablet  
 diazePAM 2 mg tablet  
 dicyclomine 10 mg capsule  
 digoxin 0.125 mg tablet HYDROcodone-homatropine 5-1.5 mg/5 mL (5 mL) syrup  
 hydrocortisone 2.5 % rectal cream  
 ondansetron 8 mg disintegrating tablet  
 pantoprazole 40 mg tablet  
 spironolactone 25 mg tablet Discharge Instructions Hospice: Care Instructions Your Care Instructions Hospice care provides medical treatment to relieve symptoms at the end of life. The goal is to keep you comfortable, not to try to cure you. Hospice care does not speed up or lengthen dying. It focuses on easing pain and other symptoms. Hospice caregivers want to enhance your quality of life. Hospice care also offers emotional help and spiritual support when you are dying. It also helps family members care for a loved one who is dying. Hospice care can help you review your life, say important things to family and friends, and explore spiritual issues. Hospice also helps your family and friends deal with their grief after you die. You can use hospice care if your illness cannot be cured and doctors believe you have no more than 6 months to live. You do not need to be confined to a bed or in a hospital to benefit from this type of care. The hospice team includes nurses, counselors, therapists, social workers, pastors, home health aides, and trained volunteers. You can get care in your own home or in a hospice center. Some hospice workers also go to nursing homes or hospitals. How can you care for yourself at home? · Prepare a list of advance directives. These are instructions to your doctor and family members about what kind of care you want if you become unable to speak or express yourself. · Find out if your health insurance covers hospice care. · Find hospice programs in your area. People who can help include your doctor, your state health department, and your insurance company. · Decide what kinds of hospice services you want. It helps to know what you want before you enter a hospice program. 
· Think about some questions when preparing for hospice care. ¨ Who do you want to make decisions about your medical care if you are not able to? Many people choose their spouse, child, or doctor. ¨ What are you most afraid of that might happen? You might be afraid of having pain or losing your independence. Let your hospice team know your fears. The team can help you deal with them. ¨ Where would you prefer to die? Choices include your home, a hospital, or a nursing home. ¨ Do you want to donate organs when you die? Make sure that your family clearly understands this. ¨ Do you want any Orthodox rites or practices to be done before you die? Let your hospice team know what you want. Where can you learn more? Go to http://art-elmo.info/. Enter C407 in the search box to learn more about \"Hospice: Care Instructions. \" Current as of: February 24, 2016 Content Version: 11.1 © 6889-5043 [employer]. Care instructions adapted under license by [employer] (which disclaims liability or warranty for this information). If you have questions about a medical condition or this instruction, always ask your healthcare professional. [place] [address] any warranty or liability for your use of this information. Discharge Orders None ACO Transitions of Care Introducing [employer] [address] Selene Dasilva offers a voluntary care coordination program to provide high quality service and care to [facility] fee-for-service beneficiaries. Kamala Hernandez was designed to help you enhance your health and well-being through the following services: ? Transitions of Care  support for individuals who are transitioning from one care setting to another (example: Hospital to home). ? Chronic and Complex Care Coordination  support for individuals and caregivers of those with serious or chronic illnesses or with more than one chronic (ongoing) condition and those who take a number of different medications. If you meet specific medical criteria, a [facility] [address] may call you directly to coordinate your care with your primary care physician and your other care providers. For questions about the [facility] programs, please, contact your physicians office. For general questions or additional information about Accountable Care Organizations: 
Please visit www.medicare.gov/acos. html or call 1-800-MEDICARE (5-244.568.7343) TTY users should call 3-504.145.4351. [employer] Announcement  We are excited to announce that we are making your provider's discharge notes available to you in Lety[employer]. You will see these notes when they are completed and signed by the physician that discharged you from your recent hospital stay. If you have any questions or concerns about any information you see in Letyano, please call the Health Information Department where you were seen or reach out to your Primary Care Provider for more information about your plan of care. Introducing [facility]! Linn Hector introduces Letyano patient portal. Now you can access parts of your medical record, email your doctor's office, and request medication refills online. 1. In your internet browser, go to [email or web address] 2. Click on the First Time User? Click Here link in the Sign In box. You will see the New Member Sign Up page. 3. Enter your Letyano Access Code exactly as it appears below. You will not need to use this code after youve completed the sign-up process. If you do not sign up before the expiration date, you must request a new code. · Let[employer] Access Code: [email or web address] Expires: 3/23/2017 11:43 AM 
 
4. Enter the last four digits of your Social Security Number (xxxx) and Date of Birth (mm/dd/yyyy) as indicated and click Submit. You will be taken to the next sign-up page. 5. Create a Letyano ID. This will be your Letyano login ID and cannot be changed, so think of one that is secure and easy to remember. 6. Create a [employer] password. You can change your password at any time. 7. Enter your Password Reset Question and Answer. This can be used at a later time if you forget your password. 8. Enter your e-mail address. You will receive e-mail notification when new information is available in [address] 9. Click Sign Up. You can now view and download portions of your medical record. 10. Click the Download Summary menu link to download a portable copy of your medical information. If you have questions, please visit the Frequently Asked Questions section of the MyChart website. Remember, MyChart is NOT to be used for urgent needs. For medical emergencies, dial 911. Now available from your iPhone and Android! General Information Please provide this summary of care documentation to your next provider. Patient Signature:  ____________________________________________________________ Date:  ____________________________________________________________  
  
Dimple Moulds Provider Signature:  ____________________________________________________________ Date:  ____________________________________________________________

## 2017-02-06 ENCOUNTER — APPOINTMENT (OUTPATIENT)
Dept: CT IMAGING | Age: 82
DRG: 871 | End: 2017-02-06
Attending: INTERNAL MEDICINE
Payer: MEDICARE

## 2017-02-06 PROBLEM — R60.0 BILATERAL LEG EDEMA: Status: ACTIVE | Noted: 2017-02-06

## 2017-02-06 LAB
ANION GAP BLD CALC-SCNC: 13 MMOL/L (ref 7–16)
ATRIAL RATE: 75 BPM
BACTERIA SPEC CULT: NORMAL
BACTERIA SPEC CULT: NORMAL
BNP SERPL-MCNC: 1992 PG/ML
BUN SERPL-MCNC: 34 MG/DL (ref 8–23)
CALCIUM SERPL-MCNC: 7.5 MG/DL (ref 8.3–10.4)
CALCULATED P AXIS, ECG09: 81 DEGREES
CALCULATED R AXIS, ECG10: 3 DEGREES
CALCULATED T AXIS, ECG11: 16 DEGREES
CHLORIDE SERPL-SCNC: 103 MMOL/L (ref 98–107)
CO2 SERPL-SCNC: 24 MMOL/L (ref 21–32)
CREAT SERPL-MCNC: 1.94 MG/DL (ref 0.6–1)
DIAGNOSIS, 93000: NORMAL
DIASTOLIC BP, ECG02: NORMAL MMHG
ERYTHROCYTE [DISTWIDTH] IN BLOOD BY AUTOMATED COUNT: 13.1 % (ref 11.9–14.6)
GLUCOSE BLD STRIP.AUTO-MCNC: 161 MG/DL (ref 65–100)
GLUCOSE BLD STRIP.AUTO-MCNC: 163 MG/DL (ref 65–100)
GLUCOSE BLD STRIP.AUTO-MCNC: 233 MG/DL (ref 65–100)
GLUCOSE SERPL-MCNC: 250 MG/DL (ref 65–100)
HCT VFR BLD AUTO: 32.4 % (ref 35.8–46.3)
HGB BLD-MCNC: 10.6 G/DL (ref 11.7–15.4)
LACTATE SERPL-SCNC: 1.6 MMOL/L (ref 0.4–2)
LIPASE SERPL-CCNC: 76 U/L (ref 73–393)
MAGNESIUM SERPL-MCNC: 2 MG/DL (ref 1.8–2.4)
MCH RBC QN AUTO: 30.9 PG (ref 26.1–32.9)
MCHC RBC AUTO-ENTMCNC: 32.7 G/DL (ref 31.4–35)
MCV RBC AUTO: 94.5 FL (ref 79.6–97.8)
P-R INTERVAL, ECG05: 174 MS
PLATELET # BLD AUTO: 303 K/UL (ref 150–450)
PMV BLD AUTO: 10.6 FL (ref 10.8–14.1)
POTASSIUM SERPL-SCNC: 3.1 MMOL/L (ref 3.5–5.1)
Q-T INTERVAL, ECG07: 444 MS
QRS DURATION, ECG06: 74 MS
QTC CALCULATION (BEZET), ECG08: 495 MS
RBC # BLD AUTO: 3.43 M/UL (ref 4.05–5.25)
SERVICE CMNT-IMP: NORMAL
SODIUM SERPL-SCNC: 140 MMOL/L (ref 136–145)
SYSTOLIC BP, ECG01: NORMAL MMHG
VENTRICULAR RATE, ECG03: 75 BPM
WBC # BLD AUTO: 15.6 K/UL (ref 4.3–11.1)

## 2017-02-06 PROCEDURE — 83605 ASSAY OF LACTIC ACID: CPT | Performed by: INTERNAL MEDICINE

## 2017-02-06 PROCEDURE — 74011250637 HC RX REV CODE- 250/637: Performed by: NURSE PRACTITIONER

## 2017-02-06 PROCEDURE — 74011000250 HC RX REV CODE- 250: Performed by: INTERNAL MEDICINE

## 2017-02-06 PROCEDURE — 74176 CT ABD & PELVIS W/O CONTRAST: CPT

## 2017-02-06 PROCEDURE — C9113 INJ PANTOPRAZOLE SODIUM, VIA: HCPCS | Performed by: NURSE PRACTITIONER

## 2017-02-06 PROCEDURE — 74011250636 HC RX REV CODE- 250/636: Performed by: INTERNAL MEDICINE

## 2017-02-06 PROCEDURE — 82962 GLUCOSE BLOOD TEST: CPT

## 2017-02-06 PROCEDURE — 77010033678 HC OXYGEN DAILY

## 2017-02-06 PROCEDURE — 87641 MR-STAPH DNA AMP PROBE: CPT | Performed by: EMERGENCY MEDICINE

## 2017-02-06 PROCEDURE — 83690 ASSAY OF LIPASE: CPT | Performed by: INTERNAL MEDICINE

## 2017-02-06 PROCEDURE — 80048 BASIC METABOLIC PNL TOTAL CA: CPT | Performed by: INTERNAL MEDICINE

## 2017-02-06 PROCEDURE — 99233 SBSQ HOSP IP/OBS HIGH 50: CPT | Performed by: INTERNAL MEDICINE

## 2017-02-06 PROCEDURE — 74011636637 HC RX REV CODE- 636/637: Performed by: INTERNAL MEDICINE

## 2017-02-06 PROCEDURE — 74011000258 HC RX REV CODE- 258: Performed by: INTERNAL MEDICINE

## 2017-02-06 PROCEDURE — 77030019605

## 2017-02-06 PROCEDURE — 74011636320 HC RX REV CODE- 636/320: Performed by: INTERNAL MEDICINE

## 2017-02-06 PROCEDURE — 65620000000 HC RM CCU GENERAL

## 2017-02-06 PROCEDURE — 83735 ASSAY OF MAGNESIUM: CPT | Performed by: INTERNAL MEDICINE

## 2017-02-06 PROCEDURE — 74011250637 HC RX REV CODE- 250/637: Performed by: INTERNAL MEDICINE

## 2017-02-06 PROCEDURE — 83880 ASSAY OF NATRIURETIC PEPTIDE: CPT | Performed by: INTERNAL MEDICINE

## 2017-02-06 PROCEDURE — 77030013794 HC KT TRNSDUC BLD EDWD -B

## 2017-02-06 PROCEDURE — 85027 COMPLETE CBC AUTOMATED: CPT | Performed by: INTERNAL MEDICINE

## 2017-02-06 PROCEDURE — 74011250636 HC RX REV CODE- 250/636: Performed by: NURSE PRACTITIONER

## 2017-02-06 RX ORDER — SODIUM CHLORIDE 0.9 % (FLUSH) 0.9 %
5 SYRINGE (ML) INJECTION EVERY 8 HOURS
Status: DISCONTINUED | OUTPATIENT
Start: 2017-02-06 | End: 2017-02-17 | Stop reason: HOSPADM

## 2017-02-06 RX ORDER — SODIUM CHLORIDE 9 MG/ML
50 INJECTION, SOLUTION INTRAVENOUS CONTINUOUS
Status: DISCONTINUED | OUTPATIENT
Start: 2017-02-06 | End: 2017-02-08

## 2017-02-06 RX ORDER — METRONIDAZOLE 500 MG/100ML
500 INJECTION, SOLUTION INTRAVENOUS EVERY 8 HOURS
Status: DISCONTINUED | OUTPATIENT
Start: 2017-02-06 | End: 2017-02-14

## 2017-02-06 RX ORDER — SODIUM CHLORIDE 0.9 % (FLUSH) 0.9 %
5-10 SYRINGE (ML) INJECTION AS NEEDED
Status: DISCONTINUED | OUTPATIENT
Start: 2017-02-06 | End: 2017-02-17 | Stop reason: HOSPADM

## 2017-02-06 RX ORDER — METOCLOPRAMIDE 10 MG/1
20 TABLET ORAL
Status: DISCONTINUED | OUTPATIENT
Start: 2017-02-06 | End: 2017-02-07

## 2017-02-06 RX ORDER — SODIUM CHLORIDE 9 MG/ML
100 INJECTION, SOLUTION INTRAVENOUS CONTINUOUS
Status: DISCONTINUED | OUTPATIENT
Start: 2017-02-06 | End: 2017-02-06

## 2017-02-06 RX ORDER — SUCRALFATE 1 G/10ML
1 SUSPENSION ORAL
Status: DISCONTINUED | OUTPATIENT
Start: 2017-02-06 | End: 2017-02-17 | Stop reason: HOSPADM

## 2017-02-06 RX ORDER — PANTOPRAZOLE SODIUM 40 MG/10ML
40 INJECTION, POWDER, LYOPHILIZED, FOR SOLUTION INTRAVENOUS EVERY 12 HOURS
Status: DISCONTINUED | OUTPATIENT
Start: 2017-02-06 | End: 2017-02-16

## 2017-02-06 RX ORDER — POTASSIUM CHLORIDE 20MEQ/15ML
40 LIQUID (ML) ORAL 2 TIMES DAILY
Status: COMPLETED | OUTPATIENT
Start: 2017-02-06 | End: 2017-02-06

## 2017-02-06 RX ORDER — ONDANSETRON 2 MG/ML
4 INJECTION INTRAMUSCULAR; INTRAVENOUS
Status: DISCONTINUED | OUTPATIENT
Start: 2017-02-06 | End: 2017-02-14

## 2017-02-06 RX ORDER — PANTOPRAZOLE SODIUM 40 MG/1
40 TABLET, DELAYED RELEASE ORAL DAILY
Status: DISCONTINUED | OUTPATIENT
Start: 2017-02-06 | End: 2017-02-06

## 2017-02-06 RX ORDER — MUPIROCIN 20 MG/G
OINTMENT TOPICAL 2 TIMES DAILY
Status: COMPLETED | OUTPATIENT
Start: 2017-02-06 | End: 2017-02-10

## 2017-02-06 RX ORDER — ENOXAPARIN SODIUM 100 MG/ML
30 INJECTION SUBCUTANEOUS EVERY 24 HOURS
Status: DISCONTINUED | OUTPATIENT
Start: 2017-02-06 | End: 2017-02-11

## 2017-02-06 RX ORDER — ACETAMINOPHEN 500 MG
500 TABLET ORAL
Status: DISCONTINUED | OUTPATIENT
Start: 2017-02-06 | End: 2017-02-08

## 2017-02-06 RX ADMIN — METOCLOPRAMIDE 20 MG: 10 TABLET ORAL at 10:00

## 2017-02-06 RX ADMIN — HUMAN INSULIN 2 UNITS: 100 INJECTION, SOLUTION SUBCUTANEOUS at 19:37

## 2017-02-06 RX ADMIN — METRONIDAZOLE 500 MG: 500 INJECTION, SOLUTION INTRAVENOUS at 01:33

## 2017-02-06 RX ADMIN — SUCRALFATE 1 G: 1 SUSPENSION ORAL at 17:30

## 2017-02-06 RX ADMIN — POTASSIUM CHLORIDE 40 MEQ: 20 SOLUTION ORAL at 17:30

## 2017-02-06 RX ADMIN — SUCRALFATE 1 G: 1 SUSPENSION ORAL at 21:52

## 2017-02-06 RX ADMIN — PANTOPRAZOLE SODIUM 40 MG: 40 TABLET, DELAYED RELEASE ORAL at 10:09

## 2017-02-06 RX ADMIN — Medication 5 ML: at 21:52

## 2017-02-06 RX ADMIN — Medication 5 ML: at 06:00

## 2017-02-06 RX ADMIN — ONDANSETRON 4 MG: 2 INJECTION INTRAMUSCULAR; INTRAVENOUS at 02:22

## 2017-02-06 RX ADMIN — CEFTRIAXONE 1 G: 1 INJECTION, POWDER, FOR SOLUTION INTRAMUSCULAR; INTRAVENOUS at 21:51

## 2017-02-06 RX ADMIN — POTASSIUM CHLORIDE 40 MEQ: 20 SOLUTION ORAL at 10:11

## 2017-02-06 RX ADMIN — Medication 5 ML: at 14:00

## 2017-02-06 RX ADMIN — MUPIROCIN: 20 OINTMENT TOPICAL at 12:00

## 2017-02-06 RX ADMIN — METRONIDAZOLE 500 MG: 500 INJECTION, SOLUTION INTRAVENOUS at 09:00

## 2017-02-06 RX ADMIN — NOREPINEPHRINE BITARTRATE 14 MCG/MIN: 1 INJECTION INTRAVENOUS at 03:49

## 2017-02-06 RX ADMIN — DIATRIZOATE MEGLUMINE AND DIATRIZOATE SODIUM 15 ML: 600; 100 SOLUTION ORAL; RECTAL at 02:20

## 2017-02-06 RX ADMIN — NOREPINEPHRINE BITARTRATE 9 MCG/MIN: 1 INJECTION INTRAVENOUS at 15:04

## 2017-02-06 RX ADMIN — ENOXAPARIN SODIUM 30 MG: 30 INJECTION SUBCUTANEOUS at 05:57

## 2017-02-06 RX ADMIN — PANTOPRAZOLE SODIUM 40 MG: 40 INJECTION, POWDER, FOR SOLUTION INTRAVENOUS at 21:52

## 2017-02-06 RX ADMIN — HUMAN INSULIN 4 UNITS: 100 INJECTION, SOLUTION SUBCUTANEOUS at 05:56

## 2017-02-06 RX ADMIN — SODIUM CHLORIDE 100 ML/HR: 900 INJECTION, SOLUTION INTRAVENOUS at 23:48

## 2017-02-06 RX ADMIN — METOCLOPRAMIDE 20 MG: 10 TABLET ORAL at 17:30

## 2017-02-06 RX ADMIN — ACETAMINOPHEN 500 MG: 500 TABLET ORAL at 20:18

## 2017-02-06 RX ADMIN — MUPIROCIN: 20 OINTMENT TOPICAL at 21:52

## 2017-02-06 RX ADMIN — METRONIDAZOLE 500 MG: 500 INJECTION, SOLUTION INTRAVENOUS at 17:00

## 2017-02-06 RX ADMIN — Medication 5 ML: at 00:38

## 2017-02-06 NOTE — ED TRIAGE NOTES
Ems states that pt was hypotensive at the nursing home at 70/40 and that patient has been c/o abdomial pain

## 2017-02-06 NOTE — CONSULTS
[facility] Consult Note       Primary GI Physician: Dr. Ivett Desai/Dr. Ricardo Mishra    Referring Physician:  Dr. Urvashi Guerrero Date:  2/6/2017    Admit Date:  2/5/2017    Chief Complaint:  N&V    Subjective:     History of Present Illness:  Patient is a 80 y.o. female with PMH of  (but not limited to)CAD, GERD, recurrent UTI, sepsis, hypotension on Levophed who is seen in consultation at the request of Dr. Vicky Pascual for N&V. Ms. Santana Rodriguez was evaluated in the office by Dr. Ricardo Mishra on 1/23/17 with N&V. He discontinued her HCTZ, started her on bid PPI and scheduled her for EGD and US. She underwent an abdominal US on 1/30/17 which revealed a probable small gallbladder polyp, but otherwise was unremarkable. She was scheduled for an EGD with Dr. Devin Snyder on 2/2/17, but cancelled due to increasing N&V. She was evaluated in the ED following her ov and was then scheduled for an UGI series with KUB on 2/2/17. This was also cancelled. She continued to have N&V and developed syncope prompting a return visit to the ED where she was admitted on 2/5/17 with septic shock, ZACH and pulmonary infiltrate. She is currently in the ICU on flagyl and Rocephin. She is currently requiring Levophed for hypotension. She has had recent recurrent UTI and has been on multiple antibiotics. Blood and urine cultures are pending. Ms. Santana Rodriguez has continued to have N&V and been unable to keep anything down for the last 3 weeks. She denies any vomiting since her admission, but her daughter states she had an episode of vomiting this am. She is on a regular diet, but not eating due to nausea. She has had associated odynophagia, weight loss and had an episode of a \"dark stool\" x 1 in rehab. She denies any anticoagulants, change in her bowel habits, diarrhea, constipation or hematochezia. She has one BM daily. She denies any NSAIDs. She was given Reglan in the ED this past week with some relief.     6/30/11 EGD by Dr. Whitney Lezama revealed small sliding hiatal hernia, mild antral gastritis. Colonoscopy by Dr. 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 Colonoscopy 12/6/11 by Dr. Vanessa Thompson revealed a tortuous colon, but otherwise unremarkable. Recommend repeat surveillance in 5 years due to previous history of colon polyps. History obtained in collaboration with patient and daughter      CT abdomen and pelvis without contrast      COMPARISON: None.     INDICATION: Vomiting and nausea. Frequent UTIs. .     TECHNIQUE: CT imaging of the abdomen and pelvis was performed without  intravenous contrast. Radiation dose reduction techniques were used for this  study: Our CT scanners use one or all of the following: Automated exposure  control, adjustment of the mA and/or kVp according to patient's size, iterative  reconstruction.     FINDINGS:     There is trace left pleural effusion. Mild bibasilar atelectasis.     Abdomen findings:     Sensitivity is diminished secondary to the absence of IV contrast.      There is no renal calculus or hydronephrosis. There is a 16 mm cyst at the lower  pole of the left kidney.     Gallbladder is not well visualized. The unenhanced liver, pancreas, spleen, and  adrenal glands are within normal limits.     Abdominal aorta is normal in course and caliber with atherosclerotic  calcifications. Stomach is normal in contour. Small bowel loops are of normal  caliber. No small bowel obstruction. No evidence of lymphadenopathy.     Pelvic findings:     There is wall thickening of the rectum (series 2, image 72). The colon is  otherwise normal in course and caliber. There is mild presacral inflammatory  stranding. Appendix is within normal limits. Urinary bladder is decompressed by  Marav[place] catheter.     There is no free air or free fluid. Bones are diffusely osteopenic. There are  degenerative changes of the spine.  There are multilevel compression deformities,  likely due to osteopenia. Kyphoplasty changes are present at L1. There is severe  compression deformity of T11.     IMPRESSION  IMPRESSION:     1. Rectal wall thickening and mild presacral inflammatory stranding. Diagnostic  considerations include proctitis and neoplasm. No bowel obstruction.     2. No renal calculus or hydronephrosis.         PMH:  Past Medical History   Diagnosis Date    Arrhythmia      by history/ not at present    CAD (coronary artery disease)      mitral valve prolapse    Chronic pain     Gastrointestinal disorder      GERD    Gastrointestinal disorder      gallstones    GERD (gastroesophageal reflux disease)        PSH:  Past Surgical History   Procedure Laterality Date    Hx hysterectomy      Pr breast surgery procedure unlisted       lumpectomy on RT breast       Allergies: Allergies   Allergen Reactions    Aspirin Nausea and Vomiting    Pcn [Penicillins] Itching       Home Medications:  Prior to Admission medications    Medication Sig Start Date End Date Taking? Authorizing Provider   metoprolol succinate (TOPROL-XL) 25 mg XL tablet Take 25 mg by mouth daily. Yes Historical Provider   pantoprazole (PROTONIX) 40 mg tablet Take 40 mg by mouth daily. Yes Historical Provider   furosemide (LASIX) 20 mg tablet Take 20 mg by mouth daily. Yes Historical Provider   potassium chloride SR (KLOR-CON 10) 10 mEq tablet Take 10 mEq by mouth daily. Yes Historical Provider   sucralfate (CARAFATE) 100 mg/mL suspension Take 1 tsp by mouth four (4) times daily as needed. Yes Historical Provider   metoclopramide HCl (REGLAN) 5 mg tablet Take 1 Tab by mouth every six (6) hours as needed for Nausea.  1/30/17   Devon Queen DO       [facility] Medications:  Current Facility-Administered Medications   Medication Dose Route Frequency    sodium chloride (NS) flush 5 mL  5 mL InterCATHeter Q8H    sodium chloride (NS) flush 5-10 mL  5-10 mL InterCATHeter PRN    metroNIDAZOLE (FLAGYL) IVPB premix 500 mg  500 mg IntraVENous Q8H    acetaminophen (TYLENOL) tablet 500 mg  500 mg Oral Q4H PRN    enoxaparin (LOVENOX) injection 30 mg  30 mg SubCUTAneous Q24H    ondansetron (ZOFRAN) injection 4 mg  4 mg IntraVENous Q6H PRN    metoclopramide HCl (REGLAN) tablet 20 mg  20 mg Oral TID WITH MEALS    pantoprazole (PROTONIX) tablet 40 mg  40 mg Oral DAILY    NOREPINephrine (LEVOPHED) 8,000 mcg in dextrose 5% 250 mL infusion  2-16 mcg/min IntraVENous TITRATE    influenza vaccine 2016-17 (36mos+)(PF) (FLUZONE/FLUARIX/FLULAVAL QUAD) injection 0.5 mL  0.5 mL IntraMUSCular PRIOR TO DISCHARGE    insulin regular (NOVOLIN R, HUMULIN R) injection   SubCUTAneous Q6H    potassium chloride (KAON 10%) 20 mEq/15 mL oral liquid 40 mEq  40 mEq Oral BID    cefTRIAXone (ROCEPHIN) 1 g in 0.9% sodium chloride (MBP/ADV) 50 mL  1 g IntraVENous Q24H    mupirocin (BACTROBAN) 2 % ointment   Topical BID    sodium chloride (NS) flush 5-10 mL  5-10 mL IntraVENous PRN       Social History:  Social History   Substance Use Topics    Smoking status: Never Smoker    Smokeless tobacco: Not on file    Alcohol use No         Family History:  Family History   Problem Relation Age of Onset    Diabetes Mother     Hypertension Mother     Breast Cancer Neg Hx        Review of Systems:  A detailed 10 system ROS is obtained, with pertinent positives as listed above. All others are negative. Diet:  Regular diet    Objective:     Physical Exam:  Vitals:  Visit Vitals    BP 93/71    Pulse 65    Temp 98.6 °F (37 °C)    Resp (!) 32    Ht 5' 3\" (1.6 m)    Wt 56.6 kg (124 lb 12.5 oz)    SpO2 96%    BMI 22.1 kg/m2     Gen:  Pt is alert, cooperative, no acute distress FAMILY AT BEDSIDE  Skin:  Extremities and face reveal no rashes. HEENT: Sclerae anicteric. Extra-occular muscles are intact. No oral ulcers. No abnormal pigmentation of the lips. The neck is supple. Cardiovascular: Regular rate and rhythm.  No murmurs, gallops, or rubs.HYPOTENSIVE  Respiratory:  Comfortable breathing with no accessory muscle use. Clear breath sounds anteriorly with no wheezes, rales, or rhonchi. DECREASED IN THE BASES  GI:  Abdomen nondistended, soft, and nontender. Normal active bowel sounds. No enlargement of the liver or spleen. No masses palpable. Rectal:  Deferred  Musculoskeletal:  No pitting edema of the lower legs. Neurological:  Gross memory appears intact. Patient is alert and oriented. Psychiatric:  Mood appears appropriate with judgement intact. Lymphatic:  No cervical or supraclavicular adenopathy. Laboratory:    Recent Labs      02/06/17   0400  02/05/17 2010   WBC  15.6*  15.3*   HGB  10.6*  12.7   HCT  32.4*  37.4   PLT  303  351   MCV  94.5  94.0   NA  140  137   K  3.1*  3.5   CL  103  99   CO2  24  25   BUN  34*  32*   CREA  1.94*  2.12*   CA  7.5*  8.8   MG  2.0   --    GLU  250*  186*   AP   --   72   SGOT   --   23   ALT   --   13   TBILI   --   0.6   ALB   --   2.7*   TP   --   6.5   LPSE  76   --           Assessment:     Principal Problem:    Septic shock (HCC) (2/5/2017)    Active Problems:    Nausea and vomiting (2/5/2017)      Acute kidney injury ([address] (2/5/2017)      Pulmonary infiltrate (2/5/2017)      Bilateral leg edema (2/6/2017)    Patient is a 80 y.o. female with PMH of  (but not limited to)CAD, GERD, recurrent UTI, sepsis, hypotension on Levophed who is seen in consultation at the request of Dr. Arnoldo Chan for N&V. Ms. Steve Ny was undergoing outpatient workup with Dr. Zabrina Thomas since 1/23/17 for N&V. She underwent an abdominal US on 1/30/17 which revealed a probable small gallbladder polyp, but otherwise was unremarkable. She was scheduled for an EGD with Dr. Troy Buckley on 2/2/17, but cancelled due to increasing N&V. She was evaluated in the ED following her ov and was then scheduled for an UGI series with KUB on 2/2/17. This was also cancelled.  She continued to have N&V and developed syncope prompting a return visit to the ED where she was admitted on 2/5/17 with septic shock, ZACH and pulmonary infiltrate. She is currently in the ICU on flagyl and Rocephin. She is currently requiring Levophed for hypotension. Blood and urine cultures are pending. She could benefit from an EGD, but timing will be based on clinical course and once her condition as stabilized. She also has an abdominal/pelvic CT on 2/6/17 that revealed possible proctitis/mass in the rectum which will also need to be addressed once the patient is stable and able to tolerate a bowel prep. Plan: 1. Change po PPI to IV Protonix 40mg bid   2. Add sulcrafate 1gm liquid QID  3. Continue supportive care with IV zofran and IVF  4. Agree with antibiotic therapy  5. Change regular diet to clear liquid  6. Will plan for EGD once condition has stabilized. I will make her NPO after midnight and will evaluate in am for possible procedure tomorrow. Freedom Yanez. [id number]  [facility]    Patient is seen and examined in collaboration with Dr. Angelica Natarajan. Assessment and plan as per Dr. Angelica Natarajan. ATTENDING NOTE:  I have seen and examined the patient along with my NP/PA. The assessment and plan above is my own. Has abnormal CT of the region of the rectum. Should look at that with flex, at the time of the EGD.   Is up to date with screening but had a significant polyp, (tva w/hgd) of the cecum 5 ears ago, re-check colo was clear, without residual.      Gamal Cevallos MD

## 2017-02-06 NOTE — PROGRESS NOTES
Pt admitted from [facility], per Felice Dahl, can return at d/c if pt and family wants.

## 2017-02-06 NOTE — H&P
HISTORY AND PHYSICAL      Darshana Saldana    2/5/2017    Date of Admission:  2/5/2017    The patient's chart is reviewed and the patient is discussed with the staff. Admission to the hospital has been requested by Dr. Jacki Riely of the [address] [place] [address] Emergency Department for possible septic shock    Chief complaint:  Weakness, hypotension, vomiting and anorexia    History of Present Illness:     Darshana Saldana is an 80yoF with a PMH of htn, syncope, mild dementia who was recently hospitalized at [address] and was discharged to rehab. Today, she was sent to the Emergency Room from her rehab facility at [facility] for hypotension. The patient's family reports that 4 weeks ago, the patient had a mechanical fall requiring EMS to be called. She was discharged to a rehab facility at that time when she had previously been living with her son. Subsequently, she developed vomiting and inability to keep food down. During hospital evaluation was found to have hypokalemia thought due to Lasix and diarrhea and was discharged from [address] after treatment for UTI (pansensitive E. Coli). She has sought medical care for these symptoms on multiple occasions (ER facilities mainly) and has been given IVF and told she has a UTI more than once in the last month. She has taken antibiotics for UTI, though family can't remember which ones, and her vomiting has persisted despite this. Around [place] she developed a cold and has had a persistent cough since that time. It is not been productive of purulent sputum but the cough has seemed to gotten worse recently. The patient denies fevers, chills, or abdominal pain but does confirm some left flank pain. In the emergency room at [address] [place] [address] today, she was found to have a BP of 76/52mmHg that despite 2L IVF required levophed to reach 105/58mmHg, leukocytosis to 15.3K with left shift, a low procalcitonin.   She is afebrile and has a newly elevated creatinine of 2.12 from baseline near 1. Her CXR is notable for possible retrocardiac infiltrate. Dr. Jose Cruz Patton placed a right IJ central line in the ER to administer levophed. Though documented O2 sat was low in ER, her PO2 was 221 on ABG and sat was >90% during my exam.    REVIEW OF SYSTEMS:  CONSTITUTIONAL:  There is no history of fever, chills, night sweats, weight loss, weight gain, persistent fatigue, or lethargy/hypersomnolence. EYES:  Denies problems with eye pain, erythema, blurred vision, or visual field loss. ENTM:  Denies history of tinnitus, epistaxis, sore throat, hoarseness, or dysphonia. LYMPH:  Denies swollen glands. CARDIAC:  No chest pain, pressure, discomfort, palpitations, orthopnea, murmurs, + worsened LE edema. GI:  No dysphagia, heartburn reflux, +nausea/vomiting, NO diarrhea, NO abdominal pain, or bleeding. :Denies history of dysuria, hematuria, polyuria, or decreased urine output. MS:  No history of myalgias, arthralgias, bone pain, or muscle cramps. SKIN:  No history of rashes, jaundice, cyanosis, nodules, or ulcers. ENDO:  Negative for heat or cold intolerance. No history of DM. PSYCH:  Negative for anxiety, depression, insomnia, hallucinations. NEURO:  There is no history of AMS, persistent headache, decreased level of consciousness, seizures, or motor or sensory deficits. Prior to Admission Medications   Prescriptions Last Dose Informant Patient Reported? Taking? HYDROcodone-acetaminophen (NORCO)  mg tablet   No No   Sig: Take 1 Tab by mouth every six (6) hours as needed for Pain. clonazepam (KLONOPIN) 1 mg tablet  Self Yes No   Sig: Take  by mouth two (2) times a day. fentanyl (DURAGESIC) 25 mcg/hr PATCH  Self Yes No   Si Patch by TransDERmal route every seventy-two (72) hours. hydroCHLOROthiazide (HYDRODIURIL) 12.5 mg tablet   No No   Sig: Take 1 Tab by mouth daily.  Indications: Hypertension   hydrocodone-acetaminophen (LORTAB) 5-500 mg per tablet  Self No No   Sig: Take 1 Tab by mouth every four (4) hours as needed for Pain.   hydrocodone-acetaminophen (NORCO) 7.5-325 mg per tablet  Self Yes No   Sig: Take 1 Tab by mouth every eight (8) hours as needed. hydrocodone-acetaminophen (NORCO) 7.5-325 mg per tablet  Self No No   Sig: Take 1 Tab by mouth every six (6) hours as needed for Pain for 30 doses. lisinopril (PRINIVIL, ZESTRIL) 10 mg tablet   No No   Sig: Take 1 Tab by mouth daily. Indications: Hypertension   metoclopramide HCl (REGLAN) 5 mg tablet   No No   Sig: Take 1 Tab by mouth every six (6) hours as needed for Nausea. Facility-Administered Medications: None       Past Medical History   Diagnosis Date    Arrhythmia      by history/ not at present    CAD (coronary artery disease)      mitral valve prolapse    Chronic pain     Gastrointestinal disorder      GERD    Gastrointestinal disorder      gallstones    GERD (gastroesophageal reflux disease)      Past Surgical History   Procedure Laterality Date    Hx hysterectomy      Pr breast surgery procedure unlisted       lumpectomy on RT breast     Social History     Social History    Marital status:      Spouse name: N/A    Number of children: N/A    Years of education: N/A     Occupational History    Not on file.      Social History Main Topics    Smoking status: Never Smoker    Smokeless tobacco: Not on file    Alcohol use No    Drug use: No    Sexual activity: Not Currently     Other Topics Concern    Not on file     Social History Narrative     Family History   Problem Relation Age of Onset    Diabetes Mother     Hypertension Mother     Breast Cancer Neg Hx      Allergies   Allergen Reactions    Aspirin Nausea and Vomiting    Pcn [Penicillins] Itching       Objective:     Vitals:    02/05/17 2211 02/05/17 2252 02/05/17 2303 02/05/17 2308   BP: 110/55 (!) 85/51 (!) 82/53 (!) 82/53   Pulse:  70 72 70   Resp:  22 24 22   Temp:       SpO2:       Weight:       Height:           PHYSICAL EXAM     Constitutional:  the patient is well developed and in no acute distress  EENMT:  Sclera clear, pupils equal, oral mucosa moist  Respiratory: crackles L base  Cardiovascular:  RRR without M,G,R  Gastrointestinal: soft and non-tender; with positive bowel sounds. There is mild left flank tenderness reported. No peritoneal or localizing signs. Musculoskeletal: warm without cyanosis. There is 1+ lower leg edema. Skin:  no jaundice or rashes,    Neurologic: no gross neuro deficits     Psychiatric:  alert and oriented x 3    Chest Xray:      1/19/17 urine culture:  E. Coli 100K  UA 2/5: negative    TTE 8/26/17   Normal chamber sizes. · The left ventricular systolic function is normal (55-65%). · Grade I (mild) left ventricular diastolic dysfunction present,   consistent with impaired relaxation. · Mild tricuspid valve regurgitation. RVSP 43mmHg    Recent Labs      02/05/17 2010   WBC  15.3*   HGB  12.7   HCT  37.4   PLT  351     Recent Labs      02/05/17 2010   NA  137   K  3.5   CL  99   GLU  186*   CO2  25   BUN  32*   CREA  2.12*   CA  8.8   ALB  2.7*   TBILI  0.6   ALT  13   SGOT  23     Recent Labs      02/05/17   2200   PH  7.50*   PCO2  24*   PO2  221*   HCO3  18*     No results for input(s): LCAD, LAC in the last 72 hours. Assessment:  (Medical Decision Making)         Hospital Problems  Date Reviewed: 2/5/2017          Codes Class Noted POA    Septic shock (HCC)  Monitor lactic acid q6h until normalized. Continue IVF and levophed to maintain MAP of 65.   F/u cultures and will pursue imaging of abdomen to further identify source of nausea and possibly intraabd infection ICD-10-CM: A41.9, R65.21  ICD-9-CM: 038.9, 785.52, 995.92  2/5/2017 Unknown        Nausea and vomiting ICD-10-CM: R11.2  ICD-9-CM: 787.01  2/5/2017 Unknown    Will obtain CT abd/pelvis since symptoms have persisted despite multiple rounds of abx for UTI    Acute kidney injury [facility]) ICD-10-CM: N17.9  ICD-9-CM: 584.9  2/5/2017 Unknown    Likely due to septic shock. Continue IVF and monitor urine output closely. Pulmonary infiltrate ICD-10-CM: R91.8  ICD-9-CM: 793.19  2/5/2017 Unknown    Possible left basilar pneumonia. May have aspirated during vomiting. F/u images from CT      H/o CHF with edema:  Check BNP. Holding lasix currently. Plan:  (Medical Decision Making)     --Continue IVF and levophed for supportive care of septic shock. Abx will target intra-abdominal pathology given patient's symptoms of nausea/vomiting and will use ceftriaxone/flagyl. Consider pyelo, diverticulitis, etc.  F/u LLL of lung on CT as well.  --CT Abd/pelvis  --Monitor renal function and urine output closely  --Pt with worsening LE edema. F/u BNP and consider TTE if appropriate (not responding to therapy for septic shock)  --Holding antihypertensives currently  --Full code  --DVT ppx    More than 50% of the time documented was spent in face-to-face contact with the patient and in the care of the patient on the floor/unit where the patient is located. 58 minutes of critical care time were spent in the management of this patient for septic shock.     Flaca Romero MD

## 2017-02-06 NOTE — PROGRESS NOTES
Problem: Nutrition Deficit  Goal: *Optimize nutritional status  Nutrition  Reason for assessment: Referral received from nursing admission Malnutrition Screening Tool for recently lost 14-23# without trying and eating poorly due to decreased appetite. Assessment:   Diet order(s): 2-6: Regular, then clear liquid, 2-7: NPO for possible EGD tomorrow  Food/Nutrition History:  Pt seen in company of daughter in law, sister and niece. Pt with hx of dementia and was asleep. Daughter in law provided all the history. Pt lives with son and this daughter in law. They noticed she wasn't eating quite at well (~50% of her usual) for a few weeks prior to a fall on 12-31-16. She developed nausea after the fall and has had it every day for the past ~ 3 weeks. She has had intermittent nausea. She does not tolerate one beverage or food item better than another as she has vomiting with anything. Stated -140#. However DIL reports she edema but she has noticed some subjective weight loss in her face and hands. Anthropometrics: Height: 5' 3\" (160 cm), Weight: 56.6 kg (124 lb 12.5 oz), Weight Source: Bed, Body mass index is 22.1 kg/(m^2). BMI class of underweight. Weight hx per EMR ( Based on connect care functionality, RD cannot know if these weight are actual versus stated):    WT / BMI 1/30/2017 12/23/2016 12/4/2015   WEIGHT 136 lb 136 lb 3.2 oz 143 lb 12.8 oz   ~9 % change in wt within ~ 1month c/w severe change.   Macronutrient needs:              EER:  5512-1395 kcal /day (25-30 kcal/kg ABW)              EPR:  52-63 grams protein/day (1-1.2 grams/kg IBW)  Intake/Comparative Standards: Current clear liquid diet does not meet kcal or protein needs    Meets Criteria for Malnutrition in the context of Chronic Illness   [X] Severe Malnutrition, as evidenced by:              [ ] Severe loss of muscle mass              [X] Nutritional intake of <75% of energy intake compared to estimated energy needs for > 1 month              [X] Weight loss of >5% in 1 month, >7.5% in 3 months,  >10% in 6 months, or >20% in 12 months              [ ] Severe edema              [ ] Severe loss of subcutaneous fat              [ ] Functional decline         Nutrition Diagnosis: Inadequate oral intake r/t inability to consume sufficient oral intake as evidenced by N/V limits po intake and wt loss as above  Intervention:  Meals and snacks:  Await progression of p.o. diet as GI status allows. PN: If it is expected that patient will be unable to tolerate p.o. diet greater than 60% of full liquid diet or greater within 3-5 days, consider TPN. If TPN is pursued, please order nutrition consult for TPN management.      Neeraj Mcdaniel, [address], [address] [place] [address], [address] [place] [address]

## 2017-02-06 NOTE — ED PROVIDER NOTES
HPI Comments: 80-year-old lady with a history of low blood pressure presented via EMS from a recent episode. Patient was apparently admitted in December at [facility] for a syncopal episode and then was seen in their emergency department a few days later for weakness and then seen in our emergency department about 5 days ago for weakness. Apparently at the patient's rehabilitation facility she has continued to deteriorate and has not had any energy and has not been able to participate in rehabilitation. Son became concerned so on the check her vital signs this evening and found her to be potentially hypoxic and very hypotensive EMS was called who brought her to us. Arrival and 18-gauge IV was placed in her left before meals but that soon infiltrated. Therefore I placed a right internal jugular central line. Patient says that she just feels tired but has not had any vomiting or diarrhea and no specific fevers or chills. Patient said she's had no blood in her bowels or urine nursing home staff did say they thought her stool may have looked black. She denies any pain. Patient specifically told me she was not having any abdominal pain. The son indicated that she may have been having some earlier today or yesterday. Elements of this note were created using speech recognition software. As such, errors of speech recognition may be present. Patient is a 80 y.o. female presenting with hypotension. The history is provided by the patient. Hypotension    Associated symptoms include weakness. Pertinent negatives include no confusion.         Past Medical History:   Diagnosis Date    Arrhythmia      by history/ not at present    CAD (coronary artery disease)      mitral valve prolapse    Chronic pain     Gastrointestinal disorder      GERD    Gastrointestinal disorder      gallstones    GERD (gastroesophageal reflux disease)        Past Surgical History:   Procedure Laterality Date    Hx hysterectomy      Pr breast surgery procedure unlisted       lumpectomy on RT breast         Family History:   Problem Relation Age of Onset    Diabetes Mother     Hypertension Mother     Breast Cancer Neg Hx        Social History     Social History    Marital status:      Spouse name: N/A    Number of children: N/A    Years of education: N/A     Occupational History    Not on file. Social History Main Topics    Smoking status: Never Smoker    Smokeless tobacco: Not on file    Alcohol use No    Drug use: No    Sexual activity: Not Currently     Other Topics Concern    Not on file     Social History Narrative         ALLERGIES: Aspirin and Pcn [penicillins]    Review of Systems   Constitutional: Positive for activity change and fatigue. Negative for chills, diaphoresis and fever. HENT: Negative for congestion, rhinorrhea and sore throat. Eyes: Negative for redness and visual disturbance. Respiratory: Negative for cough, chest tightness, shortness of breath and wheezing. Cardiovascular: Negative for chest pain and palpitations. Gastrointestinal: Positive for abdominal pain. Negative for blood in stool, diarrhea, nausea and vomiting. Endocrine: Negative for polydipsia and polyuria. Genitourinary: Negative for dysuria and hematuria. Musculoskeletal: Negative for arthralgias, myalgias and neck stiffness. Skin: Negative for rash. Allergic/Immunologic: Negative for environmental allergies and food allergies. Neurological: Positive for weakness. Negative for dizziness and headaches. Hematological: Negative for adenopathy. Does not bruise/bleed easily. Psychiatric/Behavioral: Negative for confusion and sleep disturbance. The patient is not nervous/anxious.         Vitals:    02/05/17 2030 02/05/17 2032 02/05/17 2039 02/05/17 2042   BP: (!) 69/41 (!) 65/43 (!) 69/49 (!) 76/50   Pulse: 75 75 74 75   Resp: (!) 51 (!) 57 (!) 78 (!) 45   Temp:       SpO2: 92% 95% 96% 95%   Weight:       Height: Physical Exam   Constitutional: She is oriented to person, place, and time. She appears well-developed and well-nourished. A frail, hypotensive elderly lady who is awake alert and conversant. HENT:   Head: Normocephalic and atraumatic. Eyes: Conjunctivae and EOM are normal. Pupils are equal, round, and reactive to light. Neck: Normal range of motion. Cardiovascular: Normal rate and regular rhythm. Pulmonary/Chest: Effort normal and breath sounds normal. No respiratory distress. She has no wheezes. She has no rales. She exhibits no tenderness. Abdominal: Soft. Bowel sounds are normal. There is no rebound and no guarding. Musculoskeletal: Normal range of motion. She exhibits no edema or tenderness. Lymphadenopathy:     She has no cervical adenopathy. Neurological: She is alert and oriented to person, place, and time. Skin: Skin is warm and dry. Psychiatric: She has a normal mood and affect. Nursing note and vitals reviewed. MDM  Number of Diagnoses or Management Options  Diagnosis management comments: 9:04 PM  On ultrasound guidance I placed a right internal jugular triple lumen central line. I prepped the patient in the usual sterile fashion and placed a sterile cover over the ultrasound probe. Using Seldinger technique I inserted the needle and got a return of venous appearing blood. There was no pulsatile bleeding and no expanding hematoma on the neck. I then threaded the guidewire and removed the needle. Placed a dilator over the guidewire and made a skin incision to facilitate placing the dilator. I then removed the dilator leaving the guidewire in place and threaded the triple lumen catheter over the guidewire. I was able to easily slide it through the skin into the vein. I got positive return of blood from all 3 ports was able to flush all 3 ports without any obvious expanding hematoma and no cardiac dysrhythmias.   I will order a chest x-ray to look for placement and to evaluate for any pneumothoraces. 9:18 PM  Chest x-ray showed good placement of the central line and no evidence for a pneumothorax. We will start the fluids and Levaquin fed through it.    9:30 PM  I spoke with the intensivist who kindly agreed to see the patient.    ===================================================================  This patient is critically ill and there is a high probability of of imminent or life threatening deterioration in the patient's condition without immediate management. The nature of the patient's clinical problem is: sepsis    I have spent 75 minutes in direct patient care, documentation, review of labs/xrays/old records, discussion with family and intensivist .     The time involved in the performance of separately reportable procedures was not counted toward critical care time.      Geovany Yeung MD; 2/5/2017 @9:31 PM  ===================================================================          .    ED Course       Procedures

## 2017-02-06 NOTE — PROGRESS NOTES
Bedside and Verbal shift change report given to Jose Wong RN (oncoming nurse) by Suzanne Washington RN (offgoing nurse). Report included the following information SBAR, Kardex, ED Summary, Intake/Output, MAR and Recent Results.

## 2017-02-06 NOTE — PROGRESS NOTES
Critical Care Daily Progress Note: 2/6/2017    Monica Rivera   Admission Date: 2/5/2017         The patient's chart is reviewed and the patient is discussed with the staff. 80yoF with one month of nausea/vomiting/inability to eat, history of UTIs, recent fall who is admitted with septic shock of unclear origin. Chest Xray suggestive of retrocardiac density which appears minor on abdominal CT. Awaiting formal read of CT Abd/pelvis to assess cause for severe nausea/vomiting. Subjective:   Remains hypotensive overnight with SBPs in 80s.   Hyperglycemic to 250  Creatinine slightly decreased from 2.12 to 1.94  CT Abd/pelvis has been done but not read  Lactic acid only 1.6  Received 2400 IVF since arrival in ER    Current Facility-Administered Medications   Medication Dose Route Frequency    sodium chloride (NS) flush 5 mL  5 mL InterCATHeter Q8H    sodium chloride (NS) flush 5-10 mL  5-10 mL InterCATHeter PRN    metroNIDAZOLE (FLAGYL) IVPB premix 500 mg  500 mg IntraVENous Q8H    acetaminophen (TYLENOL) tablet 500 mg  500 mg Oral Q4H PRN    enoxaparin (LOVENOX) injection 30 mg  30 mg SubCUTAneous Q24H    ondansetron (ZOFRAN) injection 4 mg  4 mg IntraVENous Q6H PRN    metoclopramide HCl (REGLAN) tablet 20 mg  20 mg Oral TID WITH MEALS    pantoprazole (PROTONIX) tablet 40 mg  40 mg Oral DAILY    NOREPINephrine (LEVOPHED) 8,000 mcg in dextrose 5% 250 mL infusion  2-16 mcg/min IntraVENous TITRATE    influenza vaccine 2016-17 (36mos+)(PF) (FLUZONE/FLUARIX/FLULAVAL QUAD) injection 0.5 mL  0.5 mL IntraMUSCular PRIOR TO DISCHARGE    sodium chloride (NS) flush 5-10 mL  5-10 mL IntraVENous PRN    0.9% sodium chloride infusion  100 mL/hr IntraVENous CONTINUOUS       Review of Systems  Constitutional:  negative for fever, chills, sweats  Cardiovascular:  negative for chest pain, palpitations, syncope, edema  Gastrointestinal:  negative for dysphagia, reflux, vomiting, diarrhea, abdominal pain, or melena.  + nausea when taking po contrast.  Neurologic:  negative for focal weakness, numbness, headache      Objective:     Vitals:    02/06/17 0336 02/06/17 0409 02/06/17 0435 02/06/17 0437   BP: (!) 83/73 93/71 (!) 89/64    Pulse: 65 68  66   Resp: (!) 33 21  22   Temp:  97.6 °F (36.4 °C)     SpO2: 98% 96%  100%   Weight:       Height:           Intake and Output:      02/05 1901 - 02/06 0700  In: 5 [P.O.:120]  Out: -     Physical Exam:          Constitutional:  the patient is well developed and in no acute distress  EENMT:  Sclera clear, pupils equal, oral mucosa moist  Respiratory: few crackles bilaterally, more on left  Cardiovascular:  RRR without M,G,R  Gastrointestinal: soft and non-tender; with positive bowel sounds. Musculoskeletal: warm without cyanosis. There is 1+ lower leg edema. Skin:  no jaundice or rashes, no wounds  Neurologic: no gross neuro deficits     Psychiatric:  alert and oriented x3    LINES:  R IJ placed in ER on 2/4    DRIPS:  Levophed @16mcg    CHEST XRAY: none this morning    CT Abd/pelvis 2/6/17: not yet read      LAB  No results for input(s): GLUCPOC in the last 72 hours. No lab exists for component: [place]   Recent Labs      02/06/17 0400  02/05/17 2010   WBC  15.6*  15.3*   HGB  10.6*  12.7   HCT  32.4*  37.4   PLT  303  351     Recent Labs      02/06/17 0400  02/05/17 2010   NA  140  137   K  3.1*  3.5   CL  103  99   CO2  24  25   GLU  250*  186*   BUN  34*  32*   CREA  1.94*  2.12*   MG  2.0   --    CA  7.5*  8.8   ALB   --   2.7*   TBILI   --   0.6   ALT   --   13   SGOT   --   23     Recent Labs      02/05/17   2200   PH  7.50*   PCO2  24*   PO2  221*   HCO3  18*     Recent Labs      02/06/17   0400   LAC  1.6       Assessment:  (Medical Decision Making)     Hospital Problems  Date Reviewed: 2/5/2017          Codes Class Noted POA    * (Principal)Septic shock ([address] ICD-10-CM: A41.9, R65.21  ICD-9-CM: 038.9, 785.52, 995.92  2/5/2017 Unknown    Source not clear. F/u CT abd/pelvis and cultures    Nausea and vomiting ICD-10-CM: R11.2  ICD-9-CM: 787.01  2/5/2017 Unknown    Continue supportive measures and f/u CT, urine cultures    Acute kidney injury [facility]) ICD-10-CM: N17.9  ICD-9-CM: 584.9  2/5/2017 Unknown    Creatinine down to 1.94 from 2.12. Monitor urine output. Pulmonary infiltrate ICD-10-CM: R91.8  ICD-9-CM: 793.19  2/5/2017 Unknown    L base with tiny effusion and minor infiltrate. On ceftriaxone          Plan:  (Medical Decision Making)     --f/u CT Abd/Pelvis  --Continue IVF and monitor renal function/uop. F/u BNP since has LE edema and getting IVF. --Continue supportive care and f/u cultures  --wean levophed if able  --Add insulin sliding scale    More than 50% of the time documented was spent in face-to-face contact with the patient and in the care of the patient on the floor/unit where the patient is located.     Dena Williamson MD

## 2017-02-06 NOTE — PROGRESS NOTES
TRANSFER - IN REPORT:    Verbal report received from Patrice Thompson, [facility] (name) on Nanda Ewing  being received from ER (unit) for routine progression of care      Report consisted of patients Situation, Background, Assessment and   Recommendations(SBAR). Information from the following report(s) SBAR, Kardex and ED Summary was reviewed with the receiving nurse. Opportunity for questions and clarification was provided. Assessment completed upon patients arrival to unit and care assumed.

## 2017-02-07 ENCOUNTER — ANESTHESIA (OUTPATIENT)
Dept: ENDOSCOPY | Age: 82
DRG: 871 | End: 2017-02-07
Payer: MEDICARE

## 2017-02-07 ENCOUNTER — ANESTHESIA EVENT (OUTPATIENT)
Dept: ENDOSCOPY | Age: 82
DRG: 871 | End: 2017-02-07
Payer: MEDICARE

## 2017-02-07 LAB
ANION GAP BLD CALC-SCNC: 12 MMOL/L (ref 7–16)
BUN SERPL-MCNC: 32 MG/DL (ref 8–23)
CALCIUM SERPL-MCNC: 8 MG/DL (ref 8.3–10.4)
CHLORIDE SERPL-SCNC: 107 MMOL/L (ref 98–107)
CO2 SERPL-SCNC: 22 MMOL/L (ref 21–32)
CREAT SERPL-MCNC: 1.25 MG/DL (ref 0.6–1)
ERYTHROCYTE [DISTWIDTH] IN BLOOD BY AUTOMATED COUNT: 13.1 % (ref 11.9–14.6)
GLUCOSE BLD STRIP.AUTO-MCNC: 119 MG/DL (ref 65–100)
GLUCOSE BLD STRIP.AUTO-MCNC: 119 MG/DL (ref 65–100)
GLUCOSE BLD STRIP.AUTO-MCNC: 133 MG/DL (ref 65–100)
GLUCOSE BLD STRIP.AUTO-MCNC: 138 MG/DL (ref 65–100)
GLUCOSE BLD STRIP.AUTO-MCNC: 139 MG/DL (ref 65–100)
GLUCOSE BLD STRIP.AUTO-MCNC: 30 MG/DL (ref 65–100)
GLUCOSE SERPL-MCNC: 153 MG/DL (ref 65–100)
HCT VFR BLD AUTO: 30.9 % (ref 35.8–46.3)
HGB BLD-MCNC: 10.1 G/DL (ref 11.7–15.4)
MAGNESIUM SERPL-MCNC: 2 MG/DL (ref 1.8–2.4)
MCH RBC QN AUTO: 30.7 PG (ref 26.1–32.9)
MCHC RBC AUTO-ENTMCNC: 32.7 G/DL (ref 31.4–35)
MCV RBC AUTO: 93.9 FL (ref 79.6–97.8)
PLATELET # BLD AUTO: 310 K/UL (ref 150–450)
PMV BLD AUTO: 10.2 FL (ref 10.8–14.1)
POTASSIUM SERPL-SCNC: 3 MMOL/L (ref 3.5–5.1)
POTASSIUM SERPL-SCNC: 3.6 MMOL/L (ref 3.5–5.1)
RBC # BLD AUTO: 3.29 M/UL (ref 4.05–5.25)
SODIUM SERPL-SCNC: 141 MMOL/L (ref 136–145)
WBC # BLD AUTO: 13 K/UL (ref 4.3–11.1)

## 2017-02-07 PROCEDURE — 77030009426 HC FCPS BIOP ENDOSC BSC -B: Performed by: INTERNAL MEDICINE

## 2017-02-07 PROCEDURE — 36592 COLLECT BLOOD FROM PICC: CPT

## 2017-02-07 PROCEDURE — 74011250636 HC RX REV CODE- 250/636: Performed by: NURSE PRACTITIONER

## 2017-02-07 PROCEDURE — 76060000032 HC ANESTHESIA 0.5 TO 1 HR: Performed by: INTERNAL MEDICINE

## 2017-02-07 PROCEDURE — 74011250637 HC RX REV CODE- 250/637: Performed by: INTERNAL MEDICINE

## 2017-02-07 PROCEDURE — 74011000258 HC RX REV CODE- 258

## 2017-02-07 PROCEDURE — 84132 ASSAY OF SERUM POTASSIUM: CPT | Performed by: INTERNAL MEDICINE

## 2017-02-07 PROCEDURE — 76040000025: Performed by: INTERNAL MEDICINE

## 2017-02-07 PROCEDURE — 85027 COMPLETE CBC AUTOMATED: CPT | Performed by: INTERNAL MEDICINE

## 2017-02-07 PROCEDURE — 74011250636 HC RX REV CODE- 250/636: Performed by: INTERNAL MEDICINE

## 2017-02-07 PROCEDURE — 88305 TISSUE EXAM BY PATHOLOGIST: CPT | Performed by: INTERNAL MEDICINE

## 2017-02-07 PROCEDURE — 74011000250 HC RX REV CODE- 250: Performed by: INTERNAL MEDICINE

## 2017-02-07 PROCEDURE — 80048 BASIC METABOLIC PNL TOTAL CA: CPT | Performed by: INTERNAL MEDICINE

## 2017-02-07 PROCEDURE — 74011250636 HC RX REV CODE- 250/636

## 2017-02-07 PROCEDURE — 82962 GLUCOSE BLOOD TEST: CPT

## 2017-02-07 PROCEDURE — C9113 INJ PANTOPRAZOLE SODIUM, VIA: HCPCS | Performed by: NURSE PRACTITIONER

## 2017-02-07 PROCEDURE — 77010033678 HC OXYGEN DAILY

## 2017-02-07 PROCEDURE — 0DJD8ZZ INSPECTION OF LOWER INTESTINAL TRACT, VIA NATURAL OR ARTIFICIAL OPENING ENDOSCOPIC: ICD-10-PCS | Performed by: INTERNAL MEDICINE

## 2017-02-07 PROCEDURE — 74011250637 HC RX REV CODE- 250/637: Performed by: NURSE PRACTITIONER

## 2017-02-07 PROCEDURE — 0DB48ZX EXCISION OF ESOPHAGOGASTRIC JUNCTION, VIA NATURAL OR ARTIFICIAL OPENING ENDOSCOPIC, DIAGNOSTIC: ICD-10-PCS | Performed by: INTERNAL MEDICINE

## 2017-02-07 PROCEDURE — 65620000000 HC RM CCU GENERAL

## 2017-02-07 PROCEDURE — 74011000258 HC RX REV CODE- 258: Performed by: INTERNAL MEDICINE

## 2017-02-07 PROCEDURE — 83735 ASSAY OF MAGNESIUM: CPT | Performed by: INTERNAL MEDICINE

## 2017-02-07 PROCEDURE — 74011000250 HC RX REV CODE- 250

## 2017-02-07 PROCEDURE — 99232 SBSQ HOSP IP/OBS MODERATE 35: CPT | Performed by: INTERNAL MEDICINE

## 2017-02-07 RX ORDER — POTASSIUM CHLORIDE 14.9 MG/ML
20 INJECTION INTRAVENOUS
Status: COMPLETED | OUTPATIENT
Start: 2017-02-07 | End: 2017-02-07

## 2017-02-07 RX ORDER — PROPOFOL 10 MG/ML
INJECTION, EMULSION INTRAVENOUS AS NEEDED
Status: DISCONTINUED | OUTPATIENT
Start: 2017-02-07 | End: 2017-02-07 | Stop reason: HOSPADM

## 2017-02-07 RX ORDER — PROPOFOL 10 MG/ML
INJECTION, EMULSION INTRAVENOUS
Status: DISCONTINUED | OUTPATIENT
Start: 2017-02-07 | End: 2017-02-07 | Stop reason: HOSPADM

## 2017-02-07 RX ORDER — LIDOCAINE HYDROCHLORIDE 20 MG/ML
INJECTION, SOLUTION EPIDURAL; INFILTRATION; INTRACAUDAL; PERINEURAL AS NEEDED
Status: DISCONTINUED | OUTPATIENT
Start: 2017-02-07 | End: 2017-02-07 | Stop reason: HOSPADM

## 2017-02-07 RX ORDER — SODIUM CHLORIDE, SODIUM LACTATE, POTASSIUM CHLORIDE, CALCIUM CHLORIDE 600; 310; 30; 20 MG/100ML; MG/100ML; MG/100ML; MG/100ML
INJECTION, SOLUTION INTRAVENOUS
Status: DISCONTINUED | OUTPATIENT
Start: 2017-02-07 | End: 2017-02-07 | Stop reason: HOSPADM

## 2017-02-07 RX ORDER — METOCLOPRAMIDE HYDROCHLORIDE 5 MG/ML
5 INJECTION INTRAMUSCULAR; INTRAVENOUS EVERY 6 HOURS
Status: DISCONTINUED | OUTPATIENT
Start: 2017-02-07 | End: 2017-02-14

## 2017-02-07 RX ADMIN — METOCLOPRAMIDE 5 MG: 5 INJECTION, SOLUTION INTRAMUSCULAR; INTRAVENOUS at 18:22

## 2017-02-07 RX ADMIN — METRONIDAZOLE 500 MG: 500 INJECTION, SOLUTION INTRAVENOUS at 01:02

## 2017-02-07 RX ADMIN — METRONIDAZOLE 500 MG: 500 INJECTION, SOLUTION INTRAVENOUS at 16:31

## 2017-02-07 RX ADMIN — METOCLOPRAMIDE 5 MG: 5 INJECTION, SOLUTION INTRAMUSCULAR; INTRAVENOUS at 23:58

## 2017-02-07 RX ADMIN — METOCLOPRAMIDE 5 MG: 5 INJECTION, SOLUTION INTRAMUSCULAR; INTRAVENOUS at 12:24

## 2017-02-07 RX ADMIN — Medication 5 ML: at 21:33

## 2017-02-07 RX ADMIN — ONDANSETRON 4 MG: 2 INJECTION INTRAMUSCULAR; INTRAVENOUS at 08:10

## 2017-02-07 RX ADMIN — ONDANSETRON 4 MG: 2 INJECTION INTRAMUSCULAR; INTRAVENOUS at 16:31

## 2017-02-07 RX ADMIN — ONDANSETRON 4 MG: 2 INJECTION INTRAMUSCULAR; INTRAVENOUS at 22:06

## 2017-02-07 RX ADMIN — PANTOPRAZOLE SODIUM 40 MG: 40 INJECTION, POWDER, FOR SOLUTION INTRAVENOUS at 08:09

## 2017-02-07 RX ADMIN — Medication 5 ML: at 13:50

## 2017-02-07 RX ADMIN — NOREPINEPHRINE BITARTRATE 8 MCG/MIN: 1 INJECTION INTRAVENOUS at 06:04

## 2017-02-07 RX ADMIN — PANTOPRAZOLE SODIUM 40 MG: 40 INJECTION, POWDER, FOR SOLUTION INTRAVENOUS at 20:28

## 2017-02-07 RX ADMIN — Medication 5 ML: at 06:05

## 2017-02-07 RX ADMIN — METOCLOPRAMIDE 20 MG: 10 TABLET ORAL at 07:56

## 2017-02-07 RX ADMIN — SODIUM CHLORIDE, SODIUM LACTATE, POTASSIUM CHLORIDE, CALCIUM CHLORIDE: 600; 310; 30; 20 INJECTION, SOLUTION INTRAVENOUS at 15:20

## 2017-02-07 RX ADMIN — METRONIDAZOLE 500 MG: 500 INJECTION, SOLUTION INTRAVENOUS at 09:49

## 2017-02-07 RX ADMIN — MUPIROCIN: 20 OINTMENT TOPICAL at 18:23

## 2017-02-07 RX ADMIN — LIDOCAINE HYDROCHLORIDE 40 MG: 20 INJECTION, SOLUTION EPIDURAL; INFILTRATION; INTRACAUDAL; PERINEURAL at 15:32

## 2017-02-07 RX ADMIN — MUPIROCIN: 20 OINTMENT TOPICAL at 08:38

## 2017-02-07 RX ADMIN — SODIUM CHLORIDE 100 ML/HR: 900 INJECTION, SOLUTION INTRAVENOUS at 21:05

## 2017-02-07 RX ADMIN — POTASSIUM CHLORIDE 20 MEQ: 14.9 INJECTION, SOLUTION INTRAVENOUS at 06:05

## 2017-02-07 RX ADMIN — PROPOFOL 10 MG: 10 INJECTION, EMULSION INTRAVENOUS at 15:32

## 2017-02-07 RX ADMIN — CEFTRIAXONE 1 G: 1 INJECTION, POWDER, FOR SOLUTION INTRAMUSCULAR; INTRAVENOUS at 20:28

## 2017-02-07 RX ADMIN — POTASSIUM CHLORIDE 20 MEQ: 14.9 INJECTION, SOLUTION INTRAVENOUS at 07:59

## 2017-02-07 RX ADMIN — SUCRALFATE 1 G: 1 SUSPENSION ORAL at 07:56

## 2017-02-07 RX ADMIN — PROPOFOL 50 MCG/KG/MIN: 10 INJECTION, EMULSION INTRAVENOUS at 15:32

## 2017-02-07 RX ADMIN — SODIUM CHLORIDE 100 ML/HR: 900 INJECTION, SOLUTION INTRAVENOUS at 10:31

## 2017-02-07 RX ADMIN — ENOXAPARIN SODIUM 30 MG: 30 INJECTION SUBCUTANEOUS at 06:04

## 2017-02-07 NOTE — H&P
Date of Surgery Update:  Sherri Ponce was seen and examined. History and physical has been reviewed. The patient has been examined.  There have been no significant clinical changes since the completion of the originally dated History and Physical.    Signed By: Iza Bang MD     February 7, 2017 3:30 PM

## 2017-02-07 NOTE — ANESTHESIA POSTPROCEDURE EVALUATION
Post-Anesthesia Evaluation and Assessment    Patient: Velia Jarvis MRN: [id number]  SSN: [id number]    YOB: 1934  Age: 80 y.o. Sex: female       Cardiovascular Function/Vital Signs  Visit Vitals    BP (P) 154/83    Pulse (P) 92    Temp 37 °C (98.6 °F)    Resp (!) (P) 36    Ht 5' 3\" (1.6 m)    Wt 63.4 kg (139 lb 12.4 oz)    SpO2 (P) 92%    Breastfeeding No    BMI 24.76 kg/m2       Patient is status post total IV anesthesia anesthesia for Procedure(s):  ESOPHAGOGASTRODUODENOSCOPY (EGD) At bedside  SIGMOIDOSCOPY FLEXIBLE at bedside  ESOPHAGOGASTRODUODENAL (EGD) BIOPSY. Nausea/Vomiting: None    Postoperative hydration reviewed and adequate. Pain:  Pain Scale 1: Numeric (0 - 10) (02/07/17 1513)  Pain Intensity 1: 0 (02/07/17 1513)   Managed    Neurological Status:   Neuro  Neurologic State: Confused; Eyes open spontaneously; Appropriate for age (02/07/17 0800)  Orientation Level: Oriented to person;Oriented to situation;Disoriented to place; Disoriented to time (02/07/17 1200)  Cognition: Appropriate for age attention/concentration; Follows commands (02/07/17 0800)  Speech: Clear (02/07/17 0800)  Assessment L Pupil: Brisk;Round (02/07/17 0800)  Size L Pupil (mm): 3 (02/07/17 0800)  Assessment R Pupil: Brisk;Round (02/07/17 0800)  Size R Pupil (mm): 3 (02/07/17 0800)  LUE Motor Response: Spontaneous ; Purposeful;Weak (02/07/17 0800)  LLE Motor Response: Spontaneous ; Purposeful;Weak (02/07/17 0800)  RUE Motor Response: Spontaneous ; Purposeful;Weak (02/07/17 0800)  RLE Motor Response: Spontaneous ; Purposeful;Weak (02/07/17 0800)   At baseline    Mental Status and Level of Consciousness: Arousable    Pulmonary Status:   O2 Device: Nasal cannula (02/07/17 1601)   Adequate oxygenation and airway patent    Complications related to anesthesia: None    Post-anesthesia assessment completed.  No concerns    Signed By: Leandro Villa MD     February 7, 2017

## 2017-02-07 NOTE — ROUTINE PROCESS
Pt will recover in CCU room 3305. VSS. Pt responds to voice. Bedside report given to Andrei Jones RN. Dr. Magalis Rincon to talk to family in waiting room.

## 2017-02-07 NOTE — PROGRESS NOTES
GI/ EGD reveals moderately severe erosive esophagitis, a polyp at the EG junction that was biopsied, a large hiatal hernia and mild gastroduodenitis. There was no obstruction seen to the proximal 3rd portion of the duodenum. No retained food or liquid in stomach. On Protonix 40 mg 2X a day IV and Reglan. LFTs normal.  Family wonders about GB as a source of her sx--deferred to Gi rounding team.  Flexible sigmoidoscopy to 40 cm was normal other than for large hemorrhoids.   Solid stool limited a complete look, but mucosa seen appeared normal.  Javi Gates MD

## 2017-02-07 NOTE — PROGRESS NOTES
1st tap water enema administered. Patient unable to hold in enema well. Return of clear output with minimal amount of soft, brown stool. Will await at least 1 hour before administering next ordered enema.

## 2017-02-07 NOTE — PROGRESS NOTES
Bedside shift change report given to Hannah Spencer RN and Saintclair Mealy, RN (oncoming nurse) by Lindsey Anguiano RN (offgoing nurse). Report included the following information SBAR, Kardex, ED Summary, Intake/Output, MAR, Recent Results and Cardiac Rhythm NSR. Skin assessed, incontinence care provided, patient turned. Levophed gtt verified.

## 2017-02-07 NOTE — PROGRESS NOTES
Patient vomited shortly after receiving morning Carafate PO and Reglan PO. GI notified, will change Reglan to IV.

## 2017-02-07 NOTE — PROGRESS NOTES
Bedside shift change report given to Isacc Shepherd RN (oncoming nurse) by Therese Bae RN (offgoing nurse). Report included the following information SBAR, Kardex, ED Summary, Intake/Output, MAR, Recent Results, Med Rec Status and Cardiac Rhythm of NSR.

## 2017-02-07 NOTE — ROUTINE PROCESS
ENDO Nurse and Tech arrived to pt's room for EGD and Flex Sig. VSS. Consent verified. Anethesia present for procedure.

## 2017-02-07 NOTE — PROGRESS NOTES
Patient tolerated procedures well. VSS. NAD. C/o severe nausea post op, PRN Zofran IVP given. Levophed infusing at 2 mcg/min, BP stable. NC at 2 L, SpO2 93%, RR 20s. Breath sounds clear, fine crackles to bases. NSR on monitor. VSS NAD. Will continue to closely monitor.

## 2017-02-07 NOTE — PROGRESS NOTES
Interdisciplinary team rounds were held 2/6/2017 with the following team members:Nursing, Nurse Practitioner, Nutrition, Palliative Care, Pastoral Care, Pharmacy, Physician, Respiratory Therapy, Wound Care and Clinical Coordinator     Plan of care discussed. See clinical pathway and/or care plan for interventions and desired outcomes.

## 2017-02-07 NOTE — INTERDISCIPLINARY ROUNDS
Interdisciplinary team rounds were held 2/7/2017 with the following team members:Care Management, Nursing, Nurse Practitioner, Nutrition, Palliative Care, Pastoral Care, Pharmacy, Physician, Respiratory Therapy and Clinical Coordinator. Plan of care discussed. See clinical pathway and/or care plan for interventions and desired outcomes.

## 2017-02-07 NOTE — ANESTHESIA PREPROCEDURE EVALUATION
Anesthetic History               Review of Systems / Medical History  Patient summary reviewed and pertinent labs reviewed    Pulmonary  Within defined limits                 Neuro/Psych   Within defined limits           Cardiovascular                  Exercise tolerance: <4 METS  Comments: Mitral valve prolapse, hypotension on Norepi gtt at 2mcg/min   GI/Hepatic/Renal               Comments: Intractable N/V, weight loss Endo/Other             Other Findings   Comments: ?sepsis vs dehydration for reason for hypotension           Physical Exam    Airway  Mallampati: II  TM Distance: 4 - 6 cm  Neck ROM: normal range of motion   Mouth opening: Normal     Cardiovascular    Rhythm: regular  Rate: normal         Dental  No notable dental hx       Pulmonary      Decreased breath sounds: bibasilar           Abdominal         Other Findings            Anesthetic Plan    ASA: 3, emergent  Anesthesia type: total IV anesthesia          Induction: Intravenous  Anesthetic plan and risks discussed with: Patient and Family

## 2017-02-07 NOTE — PROGRESS NOTES
Critical Care Daily Progress Note: 2/7/2017    Jonelle Lam   Admission Date: 2/5/2017         The patient's chart is reviewed and the patient is discussed with the staff. 80yoF with one month of nausea/vomiting/inability to eat, history of UTIs, recent fall who is admitted with septic shock of unclear origin. Chest Xray suggestive of retrocardiac density which appears minor on abdominal CT. Awaiting formal read of CT Abd/pelvis to assess cause for severe nausea/vomiting.        Subjective:       Alert and awake  On NC 3 LPM  On Levophed  Family is at bed side    Current Facility-Administered Medications   Medication Dose Route Frequency    NUTRITIONAL SUPPORT ELECTROLYTE PRN ORDERS   Does Not Apply PRN    metoclopramide HCl (REGLAN) injection 5 mg  5 mg IntraVENous Q6H    sodium chloride (NS) flush 5 mL  5 mL InterCATHeter Q8H    sodium chloride (NS) flush 5-10 mL  5-10 mL InterCATHeter PRN    metroNIDAZOLE (FLAGYL) IVPB premix 500 mg  500 mg IntraVENous Q8H    acetaminophen (TYLENOL) tablet 500 mg  500 mg Oral Q4H PRN    enoxaparin (LOVENOX) injection 30 mg  30 mg SubCUTAneous Q24H    ondansetron (ZOFRAN) injection 4 mg  4 mg IntraVENous Q6H PRN    NOREPINephrine (LEVOPHED) 8,000 mcg in dextrose 5% 250 mL infusion  2-16 mcg/min IntraVENous TITRATE    influenza vaccine 2016-17 (36mos+)(PF) (FLUZONE/FLUARIX/FLULAVAL QUAD) injection 0.5 mL  0.5 mL IntraMUSCular PRIOR TO DISCHARGE    insulin regular (NOVOLIN R, HUMULIN R) injection   SubCUTAneous Q6H    cefTRIAXone (ROCEPHIN) 1 g in 0.9% sodium chloride (MBP/ADV) 50 mL  1 g IntraVENous Q24H    mupirocin (BACTROBAN) 2 % ointment   Topical BID    pantoprazole (PROTONIX) injection 40 mg  40 mg IntraVENous Q12H    sucralfate (CARAFATE) 100 mg/mL oral suspension 1 g  1 g Oral AC&HS    0.9% sodium chloride infusion  100 mL/hr IntraVENous CONTINUOUS    sodium chloride (NS) flush 5-10 mL  5-10 mL IntraVENous PRN       Review of Systems  Constitutional:  negative for fever, chills, sweats  Cardiovascular:  negative for chest pain, palpitations, syncope, edema  Gastrointestinal:  negative for dysphagia, reflux, vomiting, diarrhea, abdominal pain, or melena.  + nausea    Neurologic:  negative for focal weakness, numbness, headache      Objective:     Vitals:    02/07/17 0945 02/07/17 1000 02/07/17 1015 02/07/17 1030   BP: 98/67 112/77 113/81 94/67   Pulse: 68 70 72 72   Resp: 26 24 22 25   Temp:       SpO2: 100% 97% 95% 95%   Weight:       Height:           Intake and Output:   02/05 1901 - 02/07 0700  In: 6356.9 [P.O.:120; I.V.:5936.9]  Out: 775 [Urine:775]  02/07 0701 - 02/07 1900  In: -   Out: 125 [Urine:125]    Physical Exam:          Constitutional:  the patient is well developed and in no acute distress  EENMT:  Sclera clear, pupils equal, oral mucosa moist  Respiratory: few crackles bilaterally, more on left  Cardiovascular:  RRR without M,G,R  Gastrointestinal: soft and non-tender; with positive bowel sounds. Musculoskeletal: warm without cyanosis. There is 1+ lower leg edema.   Skin:  no jaundice or rashes, no wounds  Neurologic: no gross neuro deficits     Psychiatric:  alert and oriented x3    LINES:  R IJ placed  on 2/4    DRIPS:  Levophed @2mcg    CHEST XRAY: none this morning      LAB  Recent Labs      02/07/17   0611  02/07/17   0101  02/06/17   1914  02/06/17   1223  02/06/17   0554   GLUCPOC  138*  139*  163*  161*  233*      Recent Labs      02/07/17   0400  02/06/17   0400  02/05/17 2010   WBC  13.0*  15.6*  15.3*   HGB  10.1*  10.6*  12.7   HCT  30.9*  32.4*  37.4   PLT  310  303  351     Recent Labs      02/07/17   0400  02/06/17   0400  02/05/17 2010   NA  141  140  137   K  3.0*  3.1*  3.5   CL  107  103  99   CO2  22  24  25   GLU  153*  250*  186*   BUN  32*  34*  32*   CREA  1.25*  1.94*  2.12*   MG  2.0  2.0   --    CA  8.0*  7.5*  8.8   ALB   --    --   2.7*   TBILI   --    --   0.6   ALT   --    --   13   SGOT --    --   23   BNPP   --   1992   --      Recent Labs      02/05/17   2200   PH  7.50*   PCO2  24*   PO2  221*   HCO3  18*     Recent Labs      02/06/17   0400   LAC  1.6       Assessment:  (Medical Decision Making)     Hospital Problems  Date Reviewed: 2/5/2017          Codes Class Noted POA    * (Principal)Septic shock ([facility] [address] ICD-10-CM: A41.9, R65.21  ICD-9-CM: 038.9, 785.52, 995.92  2/5/2017 Unknown    Source not clear. Cultures are neg so far    Nausea and vomiting ICD-10-CM: R11.2  ICD-9-CM: 787.01  2/5/2017 Unknown    For EGD today    Acute kidney injury [facility]) ICD-10-CM: N17.9  ICD-9-CM: 584.9  2/5/2017 Unknown    Creatinine down to 1. 25    Pulmonary infiltrate ICD-10-CM: R91.8  ICD-9-CM: 793.19  2/5/2017 Unknown      On ceftriaxone          Plan:  (Medical Decision Making)        --Continue IVF    --Continue Rocephin and Flagyl D #3  --wean levophed   --EGD and flex sigmoidoscopy today  --CXR in am       More than 50% of the time documented was spent in face-to-face contact with the patient and in the care of the patient on the floor/unit where the patient is located.     Nuria Pulido MD

## 2017-02-07 NOTE — PROGRESS NOTES
Spiritual Care Assessment/Progress Notes    Desiree Mar [id number]  [id number]    1934  80 y.o.  female    Patient Telephone Number: [phone] (home)   Sikh Affiliation: Enma Liang   Language: English   Extended Emergency Contact Information  Primary Emergency Contact: Tian Pringle  Address: [facility] [address] [place] [address] Road Phone: [phone]  Mobile Phone: [phone]  Relation: Son   Patient Active Problem List    Diagnosis Date Noted    Bilateral leg edema 02/06/2017    Septic shock ([facility] [address] 02/05/2017    Nausea and vomiting 02/05/2017    Acute kidney injury ([facility] [address] 02/05/2017    Pulmonary infiltrate 02/05/2017        Date: 2/7/2017       Level of Sikh/Spiritual Activity:  []         Involved in bear tradition/spiritual practice    []         Not involved in bear tradition/spiritual practice  [x]         Spiritually oriented    []         Claims no spiritual orientation    []         seeking spiritual identity  []         Feels alienated from Oriental orthodox practice/tradition  []         Feels angry about Oriental orthodox practice/tradition  []         Spirituality/Oriental orthodox tradition is a resource for coping at this time.   []         Not able to assess due to medical condition    Services Provided Today:  []         crisis intervention    []         reading Scriptures  [x]         spiritual assessment    [x]         prayer  [x]         empathic listening/emotional support  []         rites and rituals (cite in comments)  [x]         life review     []         Oriental orthodox support  []         theological development   []         advocacy  []         ethical dialog     []         blessing  []         bereavement support    [x]         support to family  []         anticipatory grief support   []         help with AMD  []         spiritual guidance    []         meditation      Spiritual Care Needs  []         Emotional Support  [] Spiritual/Rastafari Care  []         Loss/Adjustment  []         Advocacy/Referral                /Ethics  [x]         No needs expressed at               this time  []         Other: (note in               comments)  Spiritual Care Plan  []         Follow up visits with               pt/family  []         Provide materials  []         Schedule sacraments  []         Contact Community               Clergy  [x]         Follow up as needed  []         Other: (note in               comments)     Comments: daughter-in-law, sister, and brother-in-law at bedside  Patient discussed recent symptoms leading to hospitalization  I then prayed for patient prior to procedure this afternoon  Patient stated that her preferred Faith tradition is Gnosticist  Patient's son is listed as Power of   Pastoral care will continue to follow    Margy Ford.  Stefano Jarquin

## 2017-02-08 ENCOUNTER — APPOINTMENT (OUTPATIENT)
Dept: GENERAL RADIOLOGY | Age: 82
DRG: 871 | End: 2017-02-08
Attending: INTERNAL MEDICINE
Payer: MEDICARE

## 2017-02-08 PROBLEM — A41.9 SEPTIC SHOCK (HCC): Status: RESOLVED | Noted: 2017-02-05 | Resolved: 2017-02-08

## 2017-02-08 PROBLEM — K20.90 ESOPHAGITIS DETERMINED BY ENDOSCOPY: Status: ACTIVE | Noted: 2017-02-07

## 2017-02-08 PROBLEM — N17.9 ACUTE KIDNEY INJURY (HCC): Status: RESOLVED | Noted: 2017-02-05 | Resolved: 2017-02-08

## 2017-02-08 PROBLEM — R65.21 SEPTIC SHOCK (HCC): Status: RESOLVED | Noted: 2017-02-05 | Resolved: 2017-02-08

## 2017-02-08 PROBLEM — R11.2 NAUSEA AND VOMITING: Status: RESOLVED | Noted: 2017-02-05 | Resolved: 2017-02-08

## 2017-02-08 LAB
ANION GAP BLD CALC-SCNC: 15 MMOL/L (ref 7–16)
ATRIAL RATE: 86 BPM
BACTERIA SPEC CULT: NORMAL
BUN SERPL-MCNC: 28 MG/DL (ref 8–23)
CALCIUM SERPL-MCNC: 8 MG/DL (ref 8.3–10.4)
CALCULATED P AXIS, ECG09: 77 DEGREES
CALCULATED R AXIS, ECG10: 27 DEGREES
CALCULATED T AXIS, ECG11: -51 DEGREES
CHLORIDE SERPL-SCNC: 109 MMOL/L (ref 98–107)
CO2 SERPL-SCNC: 19 MMOL/L (ref 21–32)
CREAT SERPL-MCNC: 1.03 MG/DL (ref 0.6–1)
DIAGNOSIS, 93000: NORMAL
DIASTOLIC BP, ECG02: NORMAL MMHG
ERYTHROCYTE [DISTWIDTH] IN BLOOD BY AUTOMATED COUNT: 13.2 % (ref 11.9–14.6)
GLUCOSE BLD STRIP.AUTO-MCNC: 105 MG/DL (ref 65–100)
GLUCOSE BLD STRIP.AUTO-MCNC: 115 MG/DL (ref 65–100)
GLUCOSE BLD STRIP.AUTO-MCNC: 223 MG/DL (ref 65–100)
GLUCOSE SERPL-MCNC: 135 MG/DL (ref 65–100)
HCT VFR BLD AUTO: 30 % (ref 35.8–46.3)
HGB BLD-MCNC: 9.8 G/DL (ref 11.7–15.4)
MCH RBC QN AUTO: 30.7 PG (ref 26.1–32.9)
MCHC RBC AUTO-ENTMCNC: 32.7 G/DL (ref 31.4–35)
MCV RBC AUTO: 94 FL (ref 79.6–97.8)
P-R INTERVAL, ECG05: 172 MS
PLATELET # BLD AUTO: 277 K/UL (ref 150–450)
PMV BLD AUTO: 10.4 FL (ref 10.8–14.1)
POTASSIUM SERPL-SCNC: 3.3 MMOL/L (ref 3.5–5.1)
POTASSIUM SERPL-SCNC: 3.7 MMOL/L (ref 3.5–5.1)
Q-T INTERVAL, ECG07: 358 MS
QRS DURATION, ECG06: 70 MS
QTC CALCULATION (BEZET), ECG08: 428 MS
RBC # BLD AUTO: 3.19 M/UL (ref 4.05–5.25)
SERVICE CMNT-IMP: NORMAL
SODIUM SERPL-SCNC: 143 MMOL/L (ref 136–145)
SYSTOLIC BP, ECG01: NORMAL MMHG
VENTRICULAR RATE, ECG03: 86 BPM
WBC # BLD AUTO: 8.3 K/UL (ref 4.3–11.1)

## 2017-02-08 PROCEDURE — 36592 COLLECT BLOOD FROM PICC: CPT

## 2017-02-08 PROCEDURE — 74011000258 HC RX REV CODE- 258: Performed by: INTERNAL MEDICINE

## 2017-02-08 PROCEDURE — 74011000250 HC RX REV CODE- 250: Performed by: INTERNAL MEDICINE

## 2017-02-08 PROCEDURE — 65660000000 HC RM CCU STEPDOWN

## 2017-02-08 PROCEDURE — 74011250636 HC RX REV CODE- 250/636: Performed by: NURSE PRACTITIONER

## 2017-02-08 PROCEDURE — 71010 XR CHEST PORT: CPT

## 2017-02-08 PROCEDURE — 82962 GLUCOSE BLOOD TEST: CPT

## 2017-02-08 PROCEDURE — 85027 COMPLETE CBC AUTOMATED: CPT | Performed by: INTERNAL MEDICINE

## 2017-02-08 PROCEDURE — 74011250637 HC RX REV CODE- 250/637: Performed by: NURSE PRACTITIONER

## 2017-02-08 PROCEDURE — 74011250637 HC RX REV CODE- 250/637: Performed by: INTERNAL MEDICINE

## 2017-02-08 PROCEDURE — 80048 BASIC METABOLIC PNL TOTAL CA: CPT | Performed by: INTERNAL MEDICINE

## 2017-02-08 PROCEDURE — 99232 SBSQ HOSP IP/OBS MODERATE 35: CPT | Performed by: INTERNAL MEDICINE

## 2017-02-08 PROCEDURE — C9113 INJ PANTOPRAZOLE SODIUM, VIA: HCPCS | Performed by: NURSE PRACTITIONER

## 2017-02-08 PROCEDURE — 93005 ELECTROCARDIOGRAM TRACING: CPT | Performed by: INTERNAL MEDICINE

## 2017-02-08 PROCEDURE — 65270000029 HC RM PRIVATE

## 2017-02-08 PROCEDURE — 74011250636 HC RX REV CODE- 250/636: Performed by: INTERNAL MEDICINE

## 2017-02-08 PROCEDURE — 74011636637 HC RX REV CODE- 636/637: Performed by: INTERNAL MEDICINE

## 2017-02-08 PROCEDURE — 84132 ASSAY OF SERUM POTASSIUM: CPT | Performed by: INTERNAL MEDICINE

## 2017-02-08 RX ORDER — BUSPIRONE HYDROCHLORIDE 5 MG/1
5 TABLET ORAL 2 TIMES DAILY
Status: DISCONTINUED | OUTPATIENT
Start: 2017-02-08 | End: 2017-02-17 | Stop reason: HOSPADM

## 2017-02-08 RX ORDER — MORPHINE SULFATE 2 MG/ML
2 INJECTION, SOLUTION INTRAMUSCULAR; INTRAVENOUS
Status: DISCONTINUED | OUTPATIENT
Start: 2017-02-08 | End: 2017-02-17 | Stop reason: HOSPADM

## 2017-02-08 RX ORDER — FUROSEMIDE 10 MG/ML
40 INJECTION INTRAMUSCULAR; INTRAVENOUS EVERY 12 HOURS
Status: COMPLETED | OUTPATIENT
Start: 2017-02-08 | End: 2017-02-09

## 2017-02-08 RX ORDER — POTASSIUM CHLORIDE 14.9 MG/ML
20 INJECTION INTRAVENOUS
Status: COMPLETED | OUTPATIENT
Start: 2017-02-08 | End: 2017-02-08

## 2017-02-08 RX ORDER — ACETAMINOPHEN 500 MG
500 TABLET ORAL
Status: DISCONTINUED | OUTPATIENT
Start: 2017-02-08 | End: 2017-02-17 | Stop reason: HOSPADM

## 2017-02-08 RX ORDER — ALPRAZOLAM 0.5 MG/1
0.25 TABLET ORAL
Status: DISCONTINUED | OUTPATIENT
Start: 2017-02-08 | End: 2017-02-14

## 2017-02-08 RX ADMIN — PANTOPRAZOLE SODIUM 40 MG: 40 INJECTION, POWDER, FOR SOLUTION INTRAVENOUS at 08:29

## 2017-02-08 RX ADMIN — SUCRALFATE 1 G: 1 SUSPENSION ORAL at 07:27

## 2017-02-08 RX ADMIN — SUCRALFATE 1 G: 1 SUSPENSION ORAL at 10:48

## 2017-02-08 RX ADMIN — MORPHINE SULFATE 2 MG: 2 INJECTION, SOLUTION INTRAMUSCULAR; INTRAVENOUS at 22:08

## 2017-02-08 RX ADMIN — METRONIDAZOLE 500 MG: 500 INJECTION, SOLUTION INTRAVENOUS at 16:31

## 2017-02-08 RX ADMIN — METOCLOPRAMIDE 5 MG: 5 INJECTION, SOLUTION INTRAMUSCULAR; INTRAVENOUS at 06:02

## 2017-02-08 RX ADMIN — ACETAMINOPHEN 500 MG: 500 TABLET, COATED ORAL at 12:40

## 2017-02-08 RX ADMIN — MUPIROCIN: 20 OINTMENT TOPICAL at 08:30

## 2017-02-08 RX ADMIN — POTASSIUM CHLORIDE 20 MEQ: 14.9 INJECTION, SOLUTION INTRAVENOUS at 08:29

## 2017-02-08 RX ADMIN — SUCRALFATE 1 G: 1 SUSPENSION ORAL at 22:14

## 2017-02-08 RX ADMIN — MUPIROCIN: 20 OINTMENT TOPICAL at 17:30

## 2017-02-08 RX ADMIN — POTASSIUM CHLORIDE 20 MEQ: 14.9 INJECTION, SOLUTION INTRAVENOUS at 06:01

## 2017-02-08 RX ADMIN — METRONIDAZOLE 500 MG: 500 INJECTION, SOLUTION INTRAVENOUS at 00:04

## 2017-02-08 RX ADMIN — METOCLOPRAMIDE 5 MG: 5 INJECTION, SOLUTION INTRAMUSCULAR; INTRAVENOUS at 12:23

## 2017-02-08 RX ADMIN — METOCLOPRAMIDE 5 MG: 5 INJECTION, SOLUTION INTRAMUSCULAR; INTRAVENOUS at 17:30

## 2017-02-08 RX ADMIN — FUROSEMIDE 40 MG: 10 INJECTION, SOLUTION INTRAMUSCULAR; INTRAVENOUS at 22:15

## 2017-02-08 RX ADMIN — Medication 5 ML: at 05:44

## 2017-02-08 RX ADMIN — Medication 5 ML: at 22:10

## 2017-02-08 RX ADMIN — Medication 5 ML: at 13:03

## 2017-02-08 RX ADMIN — SODIUM CHLORIDE 5 MG/HR: 900 INJECTION, SOLUTION INTRAVENOUS at 07:27

## 2017-02-08 RX ADMIN — ALPRAZOLAM 0.25 MG: 0.5 TABLET ORAL at 19:54

## 2017-02-08 RX ADMIN — PANTOPRAZOLE SODIUM 40 MG: 40 INJECTION, POWDER, FOR SOLUTION INTRAVENOUS at 22:20

## 2017-02-08 RX ADMIN — ONDANSETRON 4 MG: 2 INJECTION INTRAMUSCULAR; INTRAVENOUS at 06:21

## 2017-02-08 RX ADMIN — SUCRALFATE 1 G: 1 SUSPENSION ORAL at 16:09

## 2017-02-08 RX ADMIN — CARBIDOPA AND LEVODOPA 5 MG: 25; 100 TABLET, EXTENDED RELEASE ORAL at 19:54

## 2017-02-08 RX ADMIN — HUMAN INSULIN 4 UNITS: 100 INJECTION, SOLUTION SUBCUTANEOUS at 17:40

## 2017-02-08 RX ADMIN — CEFTRIAXONE 1 G: 1 INJECTION, POWDER, FOR SOLUTION INTRAMUSCULAR; INTRAVENOUS at 22:34

## 2017-02-08 RX ADMIN — METRONIDAZOLE 500 MG: 500 INJECTION, SOLUTION INTRAVENOUS at 08:29

## 2017-02-08 RX ADMIN — ENOXAPARIN SODIUM 30 MG: 30 INJECTION SUBCUTANEOUS at 06:02

## 2017-02-08 NOTE — PROGRESS NOTES
Dr. [address] notified of new cardiology consult due to new onset A Fib without hx, Cardizem gtt started, converted back to NSR. Per Dr. [address], stop Cardizem since patient has now converted back and to call with any further rhythm changes.

## 2017-02-08 NOTE — PROGRESS NOTES
Assessment completed. Pt. Alert and oriented x2. Pleasantly confused. O2 in place @ 3lnc. Sat is 92%. Very sob with minimal movement. Pitting edema noted to RLE. Maravilla catheter draining gabrielle urine. Family at bedside. Oriented to room and staff. Call light in reach. Instructed to call for assistance. Dual skin assessment completed with Denzel Barkley RN. Excoriation noted to groin area. Boggy heels noted. Will elevate heels.

## 2017-02-08 NOTE — PROGRESS NOTES
Bedside shift change report given to CAROLEE Levine (oncoming nurse) by Rocio Sanches RN and Omayra Venegas RN (offgoing nurse). Report included the following information SBAR, Kardex, ED Summary, Procedure Summary, Intake/Output, MAR, Recent Results and Cardiac Rhythm NSR. Skin assessed. Patient repositioned. Levophed gtt verified.

## 2017-02-08 NOTE — PROGRESS NOTES
Physical Therapy Note:    Participated in interdisciplinary rounds in ICU and chart reviewed. Patient is experiencing decrease in function from baseline. Patient would benefit from skilled acute therapy to increase independence with self care/ADLs, strength, endurance, and functional mobility. Recommend PT/OT consult when medically stable and MD agrees.     Thank you for your consideration,  Jay Sharma DPT

## 2017-02-08 NOTE — PROGRESS NOTES
GI DAILY PROGRESS NOTE    Admit Date:  2/5/2017    Today's Date:  2/8/2017    CC:  NAUSEA AND VOMITING    Subjective:     Patient is up and alert but unwilling to take carafate. Does state she would try some ensure. Medications:   Current Facility-Administered Medications   Medication Dose Route Frequency    dilTIAZem (CARDIZEM) 100 mg in 0.9% sodium chloride (MBP/ADV) 100 mL infusion  0-15 mg/hr IntraVENous TITRATE    NUTRITIONAL SUPPORT ELECTROLYTE PRN ORDERS   Does Not Apply PRN    metoclopramide HCl (REGLAN) injection 5 mg  5 mg IntraVENous Q6H    sodium chloride (NS) flush 5 mL  5 mL InterCATHeter Q8H    sodium chloride (NS) flush 5-10 mL  5-10 mL InterCATHeter PRN    metroNIDAZOLE (FLAGYL) IVPB premix 500 mg  500 mg IntraVENous Q8H    acetaminophen (TYLENOL) tablet 500 mg  500 mg Oral Q4H PRN    enoxaparin (LOVENOX) injection 30 mg  30 mg SubCUTAneous Q24H    ondansetron (ZOFRAN) injection 4 mg  4 mg IntraVENous Q6H PRN    NOREPINephrine (LEVOPHED) 8,000 mcg in dextrose 5% 250 mL infusion  2-16 mcg/min IntraVENous TITRATE    influenza vaccine 2016-17 (36mos+)(PF) (FLUZONE/FLUARIX/FLULAVAL QUAD) injection 0.5 mL  0.5 mL IntraMUSCular PRIOR TO DISCHARGE    insulin regular (NOVOLIN R, HUMULIN R) injection   SubCUTAneous Q6H    cefTRIAXone (ROCEPHIN) 1 g in 0.9% sodium chloride (MBP/ADV) 50 mL  1 g IntraVENous Q24H    mupirocin (BACTROBAN) 2 % ointment   Topical BID    pantoprazole (PROTONIX) injection 40 mg  40 mg IntraVENous Q12H    sucralfate (CARAFATE) 100 mg/mL oral suspension 1 g  1 g Oral AC&HS    0.9% sodium chloride infusion  100 mL/hr IntraVENous CONTINUOUS    sodium chloride (NS) flush 5-10 mL  5-10 mL IntraVENous PRN       Review of Systems:  A review of system was obtained, with pertinent positives as listed above. All others are negative.     Objective:   Vitals:  Visit Vitals    /78    Pulse (!) 102    Temp 98.5 °F (36.9 °C)    Resp (!) 36    Ht 5' 3\" (1.6 m)    [employer] 67.7 kg (149 lb 4 oz)    SpO2 92%    Breastfeeding No    BMI 26.44 kg/m2     Intake/Output:  02/08 0701 - 02/08 1900  In: -   Out: 105 [Urine:105]  02/06 1901 - 02/08 0700  In: 4133.1 [I.V.:4133.1]  Out: 094 [Urine:722]  Exam:  General appearance: alert, cooperative, no distress  Lungs: clear to auscultation bilaterally anteriorly  Heart: regular rate and rhythm  Abdomen: soft, non-tender. Bowel sounds normal. No masses,  no organomegaly  Extremities: extremities normal, atraumatic, no cyanosis or edema  Neuro:  Alert and oriented without obvious neurological deficits. Data Review (Labs):    Recent Labs      02/08/17   0349  02/07/17   1835  02/07/17   0400  02/06/17   0400  02/05/17 2010   WBC  8.3   --   13.0*  15.6*  15.3*   HGB  9.8*   --   10.1*  10.6*  12.7   HCT  30.0*   --   30.9*  32.4*  37.4   PLT  277   --   310  303  351   MCV  94.0   --   93.9  94.5  94.0   NA  143   --   141  140  137   K  3.3*  3.6  3.0*  3.1*  3.5   CL  109*   --   107  103  99   CO2  19*   --   22  24  25   BUN  28*   --   32*  34*  32*   CREA  1.03*   --   1.25*  1.94*  2.12*   CA  8.0*   --   8.0*  7.5*  8.8   GLU  135*   --   153*  250*  186*   AP   --    --    --    --   72   SGOT   --    --    --    --   23   ALT   --    --    --    --   13   TBILI   --    --    --    --   0.6   LPSE   --    --    --   76   --        Assessment:     Principal Problem:    Septic shock (HCC) (2/5/2017)    Active Problems:    Nausea and vomiting (2/5/2017)      Acute kidney injury ([address] (2/5/2017)      Pulmonary infiltrate (2/5/2017)      Bilateral leg edema (2/6/2017)      Esophagitis determined by endoscopy (2/7/2017)         Plan:     LA GRADE C - LIKELY IMPART DUE TO RECENT VOMITING, BUT WITH A 4CM HIATAL HERNIA, SOME DEGREE OF CHRONICITY MAY BE PRESENT.     AGGRESSIVE TREATMENT IV PPI AND CARAFATE INPT  BID PPI AFTER DISCHARGE WITH OUT PT F/U  POLYP BIOPSIED AT GEJ - FOLLOW PATH AND F/U EGD MAY BE APPROPRIATE LATER AS AN OUTPT IF STABLE    WE WILL SIGN OFF FOR NOW. CALL IF WE CAN HELP.   WILL ARRANGE OUTPT F/U

## 2017-02-08 NOTE — PROCEDURES
[address]   PROCEDURE NOTE       Name:  Damaso Hobbs   MR#:  [id number]   :  1934   Account #:  [de-identified]   Date of Adm:  2017       PROCEDURE: Flexible sigmoidoscopy at the bedside. PREOPERATIVE DIAGNOSIS FOR THE FLEXIBLE SIGMOIDOSCOPY: Abnormal   CT scan with rectal inflammatory changes on CT and inflammatory   changes     POSTOPERATIVE DIAGNOSES   1. Poor prep. 2. Mucosa to 40 cm that was seen appeared normal. No rectal   abnormalities seen, except for internal hemorrhoids, which were   large. ANESTHESIA: TIVA. INSTRUMENT: PCF-H190. DESCRIPTION OF PROCEDURE: Snow Easton was in the flexible   sigmoidoscopy position. She was sedated by the nurse   anesthetist. Perianal inspection revealed possibly a large   protruding hemorrhoid, seen more internal than external. Digital   rectal exam was otherwise unremarkable. The scope was lubricated   and inserted into the anal canal. The rectal mucosa that was   seen appeared normal. There was some solid yellowish stool. There was no bleeding. The rectal mucosa was carefully inspected   and no pathology was seen. Passed the scope up into the sigmoid   and the distal descending colon to 40 cm. Again, going in, the   mucosa appeared healthy and there was solid stool that obscured   some areas of mucosal wall. The scope was then slowly removed   and retracted. There were no other abnormalities identified. Air   was taken on the way back. The patient tolerated the procedure   well. BLOOD LOSS: 0 mL. No biopsies. SUGGEST   1. Hemorrhoidal care recommendations.    2. Per rounding GI team.        MD ARAM Gross / YEISON   D:  2017   16:45   T:  2017   06:55   Job #:  [id number]

## 2017-02-08 NOTE — PROGRESS NOTES
Interdisciplinary team rounds were held 2/8/2017 with the following team members:Care Management, Nursing, Nurse Practitioner, Nutrition, Palliative Care, Pastoral Care, Pharmacy, Physical Therapy, Physician, Respiratory Therapy and Clinical Coordinator. Plan of care discussed. See clinical pathway and/or care plan for interventions and desired outcomes.

## 2017-02-08 NOTE — PROGRESS NOTES
Bedside shift change report given to Kiara German, RN and Harley Leggett RN (oncoming nurse) by Bret Paula RN (offgoing nurse). Report included the following information SBAR, Kardex, ED Summary, Procedure Summary, Intake/Output, MAR, Recent Results and Cardiac Rhythm A Fib. Patient in new onset A Fib since 0622, no hx of A Fib in computer. Dr. Marielena Yanes notified, new orders received for Cardizem gtt 0-15 mg/hr and cardiology consult.

## 2017-02-08 NOTE — PROGRESS NOTES
Bedside report received from Bryon and dual head to toe skin assessment completed. Assumed care. Patient in bed resting quietly with family at bedside. No question or concerns voiced at this time. No knew skin issues noted at this time. Neuro: Patient alert to person only. Follows all commands. Pupils PERRLA.  equal bilaterally. Cardiac: NSR HR - 95. /75. MAP 87. Respiratory: Chest expansion symmetrical. Upper lung sounds clear. Lower sounds crackles noted. RR-24. 02 saturation 94% on 2 liters via NC. Fluids/gtts: NS @ 100 ml/hr. Levophed @ 2 mcg/min.

## 2017-02-08 NOTE — PROGRESS NOTES
During bedside report patient noted to be in A-Fib with RVR. Called monitor room to get exact time of convert and it was noted at [address]. Strip printed to confirm and place on chart. Call placed to Dr. Brenana Marin by [facility]. Orders received to start Cardizem drip and consult Cardiology.

## 2017-02-08 NOTE — PROGRESS NOTES
Fletcher Bernal  Admission Date: 2/5/2017             ICU Daily Progress Note: 2/8/2017   The patient's chart is reviewed and the patient is discussed with the staff. 80yoF with one month of nausea/vomiting/inability to eat, history of UTIs, recent fall who is admitted with septic shock of unclear origin. Chest Xray suggestive of retrocardiac density which appears minor on abdominal CT. Rectal wall thickening on CT. On pressors but weaned to off. EGD reveals moderately severe erosive esophagitis, a polyp at the EG junction that was biopsied, a large hiatal hernia and mild gastroduodenitis. Hemorrhoids seen with flexsig.        Subjective:     V/A stable. On carafate and PPI. GI signed off. Tolerating clear liquid diet.     Current Facility-Administered Medications   Medication Dose Route Frequency    dilTIAZem (CARDIZEM) 100 mg in 0.9% sodium chloride (MBP/ADV) 100 mL infusion  0-15 mg/hr IntraVENous TITRATE    acetaminophen (TYLENOL) tablet 500 mg  500 mg Oral Q4H PRN    NUTRITIONAL SUPPORT ELECTROLYTE PRN ORDERS   Does Not Apply PRN    metoclopramide HCl (REGLAN) injection 5 mg  5 mg IntraVENous Q6H    sodium chloride (NS) flush 5 mL  5 mL InterCATHeter Q8H    sodium chloride (NS) flush 5-10 mL  5-10 mL InterCATHeter PRN    metroNIDAZOLE (FLAGYL) IVPB premix 500 mg  500 mg IntraVENous Q8H    enoxaparin (LOVENOX) injection 30 mg  30 mg SubCUTAneous Q24H    ondansetron (ZOFRAN) injection 4 mg  4 mg IntraVENous Q6H PRN    NOREPINephrine (LEVOPHED) 8,000 mcg in dextrose 5% 250 mL infusion  2-16 mcg/min IntraVENous TITRATE    influenza vaccine 2016-17 (36mos+)(PF) (FLUZONE/FLUARIX/FLULAVAL QUAD) injection 0.5 mL  0.5 mL IntraMUSCular PRIOR TO DISCHARGE    insulin regular (NOVOLIN R, HUMULIN R) injection   SubCUTAneous Q6H    cefTRIAXone (ROCEPHIN) 1 g in 0.9% sodium chloride (MBP/ADV) 50 mL  1 g IntraVENous Q24H    mupirocin (BACTROBAN) 2 % ointment   Topical BID    pantoprazole (PROTONIX) injection 40 mg  40 mg IntraVENous Q12H    sucralfate (CARAFATE) 100 mg/mL oral suspension 1 g  1 g Oral AC&HS    0.9% sodium chloride infusion  50 mL/hr IntraVENous CONTINUOUS    sodium chloride (NS) flush 5-10 mL  5-10 mL IntraVENous PRN         Objective:     Vitals:    02/08/17 1215 02/08/17 1230 02/08/17 1246 02/08/17 1301   BP: 100/66 128/82 129/83 (!) 124/99   Pulse: 83 90 96 92   Resp: 19 (!) 31 (!) 61 (!) 39   Temp:  98.8 °F (37.1 °C)     SpO2: 95% 93% 91% 93%   Weight:       Height:         Intake and Output:   02/06 1901 - 02/08 0700  In: 4133.1 [I.V.:4133.1]  Out: 004 [Urine:722]  02/08 0701 - 02/08 1900  In: -   Out: 150 [Urine:150]    Physical Exam:          GEN: acutely ill,   HEENT:  no alar flaring or epistaxis, oral mucosa moist without cyanosis,   NECK:  no JVD, no retractions, no thyromegaly or masses,   LUNGS:  Decreased bilaterally  HEART:  RRR with no M,G,R;  ABDOMEN:  soft with no tenderness; positive bowel sounds present  EXTREMITIES:  warm with no cyanosis, 1+ lower leg edema  SKIN:  no jaundice or ecchymosis   NEURO:  alert and oriented     LINES:   DRIPS:none  NUTRITION: clear liquid diet    CHEST XRAY:       LAB  Recent Labs      02/08/17   0349  02/07/17   0400  02/06/17   0400   WBC  8.3  13.0*  15.6*   HGB  9.8*  10.1*  10.6*   HCT  30.0*  30.9*  32.4*   PLT  277  310  303     Recent Labs      02/08/17   1200  02/08/17   0349  02/07/17   1835  02/07/17   0400  02/06/17   0400   NA   --   143   --   141  140   K  3.7  3.3*  3.6  3.0*  3.1*   CL   --   109*   --   107  103   CO2   --   19*   --   22  24   GLU   --   135*   --   153*  250*   BUN   --   28*   --   32*  34*   CREA   --   1.03*   --   1.25*  1.94*   MG   --    --    --   2.0  2.0   BNPP   --    --    --    --   1992     Recent Labs      02/05/17   2200   PH  7.50*   PCO2  24*   PO2  221*   HCO3  18*       Cultures: NTD    Assessment:     Patient Active Problem List   Diagnosis Code    Pulmonary infiltrate R91.8    Bilateral leg edema R60.0    Esophagitis determined by endoscopy K20.9       Plan     Hospital Problems  Date Reviewed: 2/5/2017          Codes Class Noted POA    Esophagitis determined by endoscopy ICD-10-CM: K20.9  ICD-9-CM: 530.10  2/7/2017 Yes    Carafate. PPI, GI signed off    Bilateral leg edema ICD-10-CM: R60.0  ICD-9-CM: 782.3  2/6/2017 Yes    Stop IV fluids, CVP 19    Pulmonary infiltrate ICD-10-CM: R91.8  ICD-9-CM: 793.19  2/5/2017 Yes    On abx        -- wean cardizem, stop IV fluids- stop soon  -- clear liquid diet, GI signed off. Advance as tolerated. --flagyl D 3, rocephin  -- protonix BID, carafate ordered  -- transfer to the floor     Marielena Agarwal NP    More than 50% of time documented was spent in face-to-face contact with the patient and in the care of the patient on the floor/unit where the patient is located. Lungs:  Clear     Heart:  RRR with no Murmur/Rubs/Gallops    Additional Comments:  Transfer     I have spoken with and examined the patient. I agree with the above assessment and plan as documented.     Gena Carrion MD

## 2017-02-08 NOTE — PROCEDURES
[facility] [address]   PROCEDURE NOTE       Name:  Ebonie Farfan   MR#:  [id number]   :  1934   Account #:  [de-identified]   Date of Adm:  2017       PROCEDURE: Upper and lower endoscopy. These procedures were done at bedside. PREOPERATIVE DIAGNOSIS FOR THE ESOPHAGOGASTRODUODENOSCOPY: Helga Maynard is an 25-year-old female. She has intractable nausea and   vomiting. POSTOPERATIVE DIAGNOSES   1. Moderately severe erosive esophagitis. 2. Large hiatal hernia. 3. Polyp at esophagogastric junction, biopsied. 4. Mild gastroduodenitis. 5. No obstruction or retained food within the gastric lumen. ANESTHESIA: TIVA. INSTRUMENT: [id number]. DESCRIPTION OF PROCEDURE: The patient was on Lovenox dosed this   morning. The upper endoscope was inserted and passed to the   hypopharynx. This area appeared normal. The esophagus was easily   entered. Beginning fairly high at 27 cm, there was LA grade C   erosive esophagitis, somewhat superficial, with no bleeding. This extended to the EG junction, which was at about 35 cm. There was no obstruction, but at the EG junction was a clearish   yellowish 4 mm polyp that was biopsied x1. Only 1 biopsy was   taken, secondary to recent Lovenox use. The patient had a 4 cm   hiatal hernia and had mild gastritis located in the antrum and   body of the stomach. Direct view revealed no retained food,   blood, or solids in the gastric lumen. Retroflex revealed the   large hiatal hernia. The pylorus was then visualized and was   patent. There was no obstruction. The patient had mild   duodenitis in the first and second portion. The scope passed to   the proximal third portion and, as far as I could see, there was   no obstruction. There was no pathology that would explain any   anemia or bleeding, etc. Air was taken on the way back. The   patient tolerated the procedure well. Blood loss was less than 1   mL. SUGGEST   1.  Follow up EG junction polyp biopsy. 2. High-dose IV PPIs. The patient is presently on Protonix 40 mg   IV b.i.d.   3. Avoid NSAIDs. 4. Continue evaluation of nausea and vomiting per primary GI   team, Dr. Oneida Harmon and others.          Rosalea Bloch, MD P[facility] / [facility]   D:  02/07/2017   16:45   T:  02/08/2017   06:54   Job #:  [id number]

## 2017-02-08 NOTE — PROGRESS NOTES
Assessment complete. Chart reviewed. Patient is alert, oriented to person and place, disoriented to time and situation. On 2L NC, lung sounds clear. NSR. BP stable. Bowel sounds active. C/o nausea. Maravilla draining gabrielle urine. LLE 2+ pitting edema, RLE 3+ pitting edema. Moves all extremities, some weakness. VSS. NAD. Will continue to monitor.

## 2017-02-08 NOTE — PROGRESS NOTES
Chart reviewed. Assessment complete. Patient alert and oriented to person, disoriented to time, place and situation. Lung sounds clear on the right, fine crackles in the base on the left, on 3L NC. NSR. BP stable on 4 mcg of levophed. CVP maintained >12. Bowel sounds active. C/o N/V. Maravilla draining clear, yellow urine, oliguric. Moves all extremities. Right triple lumen IJ c/d/i/ and infusing. VSS. NAD. Will continue to monitor.

## 2017-02-08 NOTE — PROGRESS NOTES
TRANSFER - OUT REPORT:    Verbal report given to Alistair (name) on [place]  being transferred to [id number] (unit) for routine progression of care       Report consisted of patients Situation, Background, Assessment and   Recommendations(SBAR). Information from the following report(s) SBAR, Kardex, ED Summary, Procedure Summary, Intake/Output, MAR, Recent Results and Cardiac Rhythm NSR was reviewed with the receiving nurse. Lines:   Triple Lumen 02/05/17 Right Neck (Active)   Central Line Being Utilized Yes 2/8/2017  4:00 PM   Criteria for Appropriate Use Hemodynamically unstable, requiring monitoring lines, vasopressors, or volume resuscitation 2/8/2017  4:00 PM   Site Assessment Clean, dry, & intact 2/8/2017  4:00 PM   Infiltration Assessment 0 2/8/2017  4:00 PM   Affected Extremity/Extremities Color distal to insertion site pink (or appropriate for race); Pulses palpable;Range of motion performed 2/8/2017  4:00 PM   Date of Last Dressing Change 02/06/17 2/8/2017  4:00 PM   Dressing Status Clean, dry, & intact 2/8/2017  4:00 PM   Dressing Type Transparent;Tape;Disk with Chlorhexadine Gluconate (CHG) 2/8/2017  4:00 PM   Action Taken Zeroed/Rezeroed 2/8/2017  4:00 PM   Proximal Hub Color/Line Status White;Flushed;Patent 2/8/2017  4:00 PM   Positive Blood Return (Medial Site) Yes 2/8/2017  4:00 PM   Medial Hub Color/Line Status Blue;Flushed;Patent; Infusing 2/8/2017  4:00 PM   Positive Blood Return (Lateral Site) Yes 2/8/2017  4:00 PM   Distal Hub Color/Line Status Brown;Flushed;Patent; Infusing 2/8/2017  4:00 PM   Positive Blood Return (Site #3) Yes 2/8/2017  4:00 PM   Alcohol Cap Used No 2/8/2017  4:00 PM        Opportunity for questions and clarification was provided.       Patient transported with:   O2 @ 2 liters

## 2017-02-08 NOTE — PROGRESS NOTES
Patient tolerating clear liquid diet without vomiting today, slight nausea relieved by IV Zofran. Per GI may advance diet to GI soft. Order placed in [facility] care.

## 2017-02-09 PROBLEM — R53.81 DEBILITY: Chronic | Status: ACTIVE | Noted: 2017-02-09

## 2017-02-09 LAB
ANION GAP BLD CALC-SCNC: 11 MMOL/L (ref 7–16)
ANION GAP BLD CALC-SCNC: 9 MMOL/L (ref 7–16)
BNP SERPL-MCNC: 1349 PG/ML
BUN SERPL-MCNC: 19 MG/DL (ref 8–23)
BUN SERPL-MCNC: 22 MG/DL (ref 8–23)
CALCIUM SERPL-MCNC: 7.6 MG/DL (ref 8.3–10.4)
CALCIUM SERPL-MCNC: 8.1 MG/DL (ref 8.3–10.4)
CHLORIDE SERPL-SCNC: 108 MMOL/L (ref 98–107)
CHLORIDE SERPL-SCNC: 109 MMOL/L (ref 98–107)
CO2 SERPL-SCNC: 24 MMOL/L (ref 21–32)
CO2 SERPL-SCNC: 25 MMOL/L (ref 21–32)
CREAT SERPL-MCNC: 1.08 MG/DL (ref 0.6–1)
CREAT SERPL-MCNC: 1.15 MG/DL (ref 0.6–1)
GLUCOSE BLD STRIP.AUTO-MCNC: 153 MG/DL (ref 65–100)
GLUCOSE BLD STRIP.AUTO-MCNC: 156 MG/DL (ref 65–100)
GLUCOSE BLD STRIP.AUTO-MCNC: 159 MG/DL (ref 65–100)
GLUCOSE BLD STRIP.AUTO-MCNC: 99 MG/DL (ref 65–100)
GLUCOSE SERPL-MCNC: 171 MG/DL (ref 65–100)
GLUCOSE SERPL-MCNC: 195 MG/DL (ref 65–100)
MAGNESIUM SERPL-MCNC: 1.8 MG/DL (ref 1.8–2.4)
MAGNESIUM SERPL-MCNC: 2.2 MG/DL (ref 1.8–2.4)
POTASSIUM SERPL-SCNC: 2.8 MMOL/L (ref 3.5–5.1)
POTASSIUM SERPL-SCNC: 4.2 MMOL/L (ref 3.5–5.1)
SODIUM SERPL-SCNC: 143 MMOL/L (ref 136–145)
SODIUM SERPL-SCNC: 143 MMOL/L (ref 136–145)

## 2017-02-09 PROCEDURE — 97162 PT EVAL MOD COMPLEX 30 MIN: CPT

## 2017-02-09 PROCEDURE — 99233 SBSQ HOSP IP/OBS HIGH 50: CPT | Performed by: INTERNAL MEDICINE

## 2017-02-09 PROCEDURE — 80048 BASIC METABOLIC PNL TOTAL CA: CPT | Performed by: NURSE PRACTITIONER

## 2017-02-09 PROCEDURE — 74011250637 HC RX REV CODE- 250/637: Performed by: NURSE PRACTITIONER

## 2017-02-09 PROCEDURE — 74011250636 HC RX REV CODE- 250/636: Performed by: INTERNAL MEDICINE

## 2017-02-09 PROCEDURE — 74011636637 HC RX REV CODE- 636/637: Performed by: INTERNAL MEDICINE

## 2017-02-09 PROCEDURE — 83880 ASSAY OF NATRIURETIC PEPTIDE: CPT | Performed by: NURSE PRACTITIONER

## 2017-02-09 PROCEDURE — 74011000250 HC RX REV CODE- 250: Performed by: INTERNAL MEDICINE

## 2017-02-09 PROCEDURE — 83735 ASSAY OF MAGNESIUM: CPT | Performed by: INTERNAL MEDICINE

## 2017-02-09 PROCEDURE — 83735 ASSAY OF MAGNESIUM: CPT | Performed by: NURSE PRACTITIONER

## 2017-02-09 PROCEDURE — 74011250637 HC RX REV CODE- 250/637: Performed by: INTERNAL MEDICINE

## 2017-02-09 PROCEDURE — 65660000000 HC RM CCU STEPDOWN

## 2017-02-09 PROCEDURE — C9113 INJ PANTOPRAZOLE SODIUM, VIA: HCPCS | Performed by: NURSE PRACTITIONER

## 2017-02-09 PROCEDURE — 74011250636 HC RX REV CODE- 250/636: Performed by: NURSE PRACTITIONER

## 2017-02-09 PROCEDURE — 80048 BASIC METABOLIC PNL TOTAL CA: CPT | Performed by: INTERNAL MEDICINE

## 2017-02-09 PROCEDURE — 94760 N-INVAS EAR/PLS OXIMETRY 1: CPT

## 2017-02-09 PROCEDURE — 82962 GLUCOSE BLOOD TEST: CPT

## 2017-02-09 PROCEDURE — 74011000258 HC RX REV CODE- 258: Performed by: INTERNAL MEDICINE

## 2017-02-09 RX ORDER — POTASSIUM CHLORIDE 20 MEQ/1
40 TABLET, EXTENDED RELEASE ORAL
Status: COMPLETED | OUTPATIENT
Start: 2017-02-09 | End: 2017-02-09

## 2017-02-09 RX ORDER — POTASSIUM CHLORIDE 29.8 MG/ML
40 INJECTION INTRAVENOUS EVERY 4 HOURS
Status: COMPLETED | OUTPATIENT
Start: 2017-02-09 | End: 2017-02-09

## 2017-02-09 RX ORDER — MAGNESIUM SULFATE HEPTAHYDRATE 40 MG/ML
2 INJECTION, SOLUTION INTRAVENOUS ONCE
Status: COMPLETED | OUTPATIENT
Start: 2017-02-09 | End: 2017-02-09

## 2017-02-09 RX ADMIN — METRONIDAZOLE 500 MG: 500 INJECTION, SOLUTION INTRAVENOUS at 01:03

## 2017-02-09 RX ADMIN — METOCLOPRAMIDE 5 MG: 5 INJECTION, SOLUTION INTRAMUSCULAR; INTRAVENOUS at 05:18

## 2017-02-09 RX ADMIN — METRONIDAZOLE 500 MG: 500 INJECTION, SOLUTION INTRAVENOUS at 09:05

## 2017-02-09 RX ADMIN — METOCLOPRAMIDE 5 MG: 5 INJECTION, SOLUTION INTRAMUSCULAR; INTRAVENOUS at 01:03

## 2017-02-09 RX ADMIN — CEFTRIAXONE 1 G: 1 INJECTION, POWDER, FOR SOLUTION INTRAMUSCULAR; INTRAVENOUS at 21:52

## 2017-02-09 RX ADMIN — SUCRALFATE 1 G: 1 SUSPENSION ORAL at 21:52

## 2017-02-09 RX ADMIN — CARBIDOPA AND LEVODOPA 5 MG: 25; 100 TABLET, EXTENDED RELEASE ORAL at 17:04

## 2017-02-09 RX ADMIN — METRONIDAZOLE 500 MG: 500 INJECTION, SOLUTION INTRAVENOUS at 16:50

## 2017-02-09 RX ADMIN — METOCLOPRAMIDE 5 MG: 5 INJECTION, SOLUTION INTRAMUSCULAR; INTRAVENOUS at 16:50

## 2017-02-09 RX ADMIN — FUROSEMIDE 40 MG: 10 INJECTION, SOLUTION INTRAMUSCULAR; INTRAVENOUS at 09:05

## 2017-02-09 RX ADMIN — MAGNESIUM SULFATE HEPTAHYDRATE 2 G: 40 INJECTION, SOLUTION INTRAVENOUS at 12:57

## 2017-02-09 RX ADMIN — Medication 5 ML: at 21:53

## 2017-02-09 RX ADMIN — Medication 5 ML: at 16:59

## 2017-02-09 RX ADMIN — ENOXAPARIN SODIUM 30 MG: 30 INJECTION SUBCUTANEOUS at 05:17

## 2017-02-09 RX ADMIN — POTASSIUM CHLORIDE 40 MEQ: 29.8 INJECTION, SOLUTION INTRAVENOUS at 16:50

## 2017-02-09 RX ADMIN — MUPIROCIN: 20 OINTMENT TOPICAL at 09:06

## 2017-02-09 RX ADMIN — POTASSIUM CHLORIDE 40 MEQ: 20 TABLET, EXTENDED RELEASE ORAL at 12:35

## 2017-02-09 RX ADMIN — SUCRALFATE 1 G: 1 SUSPENSION ORAL at 12:08

## 2017-02-09 RX ADMIN — Medication 5 ML: at 05:21

## 2017-02-09 RX ADMIN — MUPIROCIN: 20 OINTMENT TOPICAL at 17:04

## 2017-02-09 RX ADMIN — HUMAN INSULIN 2 UNITS: 100 INJECTION, SOLUTION SUBCUTANEOUS at 01:02

## 2017-02-09 RX ADMIN — METOCLOPRAMIDE 5 MG: 5 INJECTION, SOLUTION INTRAMUSCULAR; INTRAVENOUS at 12:08

## 2017-02-09 RX ADMIN — CARBIDOPA AND LEVODOPA 5 MG: 25; 100 TABLET, EXTENDED RELEASE ORAL at 09:05

## 2017-02-09 RX ADMIN — SUCRALFATE 1 G: 1 SUSPENSION ORAL at 05:23

## 2017-02-09 RX ADMIN — HUMAN INSULIN 2 UNITS: 100 INJECTION, SOLUTION SUBCUTANEOUS at 12:08

## 2017-02-09 RX ADMIN — SUCRALFATE 1 G: 1 SUSPENSION ORAL at 16:50

## 2017-02-09 RX ADMIN — POTASSIUM CHLORIDE 40 MEQ: 29.8 INJECTION, SOLUTION INTRAVENOUS at 12:57

## 2017-02-09 RX ADMIN — PANTOPRAZOLE SODIUM 40 MG: 40 INJECTION, POWDER, FOR SOLUTION INTRAVENOUS at 21:52

## 2017-02-09 RX ADMIN — PANTOPRAZOLE SODIUM 40 MG: 40 INJECTION, POWDER, FOR SOLUTION INTRAVENOUS at 09:05

## 2017-02-09 NOTE — PROGRESS NOTES
Shift assessment. Pt resting in bed with family at bedside. Alert and oriented. Respirations labored on heated hi flow, Dr. Mitchel Estrella informed. Assessment completed as documented. Pt offers no complaints at this time. Call light in reach, pt encouraged to call for assistance. Will continue to monitor.

## 2017-02-09 NOTE — PROGRESS NOTES
Met with pt's family (daughter in law, sister, brother in law). They shared their concerns with the rehab that pt was in prior to being admitted and do not want pt to return ([address]). They are considering taking pt home at [place] rather than going back to [facility]. Daughter in law plans on talking to pt's son tonight about pt's care and what pt would want. The family understands what hospice care can provide because they have had family members that have had hospice care. The family wants to get together mik to discuss the best plan for pt. SW will meet with the family tomorrow. Palliative care has been consulted.

## 2017-02-09 NOTE — PROGRESS NOTES
Problem: Mobility Impaired (Adult and Pediatric)  Goal: *Acute Goals and Plan of Care (Insert Text)  STG:  (1.)Ms. Jose Angel Norman will move from supine to sit and sit to supine , scoot up and down and roll side to side in bed with MAXIMAL ASSIST within 7 day(s). (2.)Ms. Jose Angel Norman will transfer from bed to chair and chair to bed with MAXIMAL ASSIST using the least restrictive device within 7 day(s). (3.)Ms. Jose Angel Norman will maintain seated balance and upright posture with SUPERVISION and no external support within 7 days. LTG:  (1.)Ms. Jose Angel Norman will move from supine to sit and sit to supine , scoot up and down and roll side to side in bed with MODERATE ASSIST within 14 day(s). (2.)Ms. Jose Angel Norman will transfer from bed to chair and chair to bed with MODERATE ASSIST using the least restrictive device within 14 day(s). (3.)Ms. Jose Angel Norman will ambulate with MODERATE ASSIST for 10+ feet with the least restrictive device within 14 day(s).     ________________________________________________________________________________________________      PHYSICAL THERAPY: INITIAL ASSESSMENT, AM 2/9/2017  INPATIENT: Hospital Day: 5  [place] / [place] / Product Type: Medicare /      NAME/AGE/GENDER: Romeo Snyder is a 80 y.o. female              PRIMARY DIAGNOSIS: Nausea and vomiting, intractability of vomiting not specified, unspecified vomiting type [R11.2]  Rectal mass [K62.9] Septic shock (HCC) Septic shock (HCC)  Procedure(s) (LRB):  ESOPHAGOGASTRODUODENOSCOPY (EGD) At bedside (N/A)  SIGMOIDOSCOPY FLEXIBLE at bedside (N/A)  ESOPHAGOGASTRODUODENAL (EGD) BIOPSY (N/A)  2 Days Post-Op  ICD-10: Treatment Diagnosis:       · Generalized Muscle Weakness (M62.81)  · Difficulty in walking, Not elsewhere classified (R26.2)  · Abnormal posture (R29.3)   Precaution/Allergies:  Aspirin and Pcn [penicillins]       ASSESSMENT:      Ms. Jose Angel Norman is an 79 Y/O F admitted to hospital following syncopal episodes and nausea/vomiting. She presents to physical therapy after transfer from ICU unit . She lives at home with her daughter who assists her with all bathing and dressing. AMB with rolling walker on occasion; daughter reports she forgets to use it sometimes. Pt is resting in supine with family at bedside. Pt with no C/O pain or discomfort, oriented, but appears slightly lethargic. Agreeable to PT assessment. Pt requires total assist x2 to roll and come to sit at EOB. Heavy verbal cues required for hand placement and sequencing. In sitting, /58 and O2 97% (optiflow). Pt noted to be more alert in sitting. Aware of seated posture and able to correct with tactile and verbal cues, but only able to maintain for short periods of time. Pt C/O nausea and returned to supine with total assist x2, which relieved her  symptoms. Pt will benefit from additional physical therapy sessions during stay to improve upright tolerance, increase strength and balance, and decreased assistance required for functional activity. This section established at most recent assessment   PROBLEM LIST (Impairments causing functional limitations):  1. Decreased Strength  2. Decreased Transfer Abilities  3. Decreased Ambulation Ability/Technique  4. Decreased Balance  5. Decreased Activity Tolerance    INTERVENTIONS PLANNED: (Benefits and precautions of physical therapy have been discussed with the patient.)  1. Balance Exercise  2. Bed Mobility  3. Family Education  4. Gait Training  5. Therapeutic Activites  6. Therapeutic Exercise/Strengthening  7. Group Therapy      TREATMENT PLAN: Frequency/Duration: 5 times a week for duration of hospital stay  Rehabilitation Potential For Stated Goals: FAIR      RECOMMENDED REHABILITATION/EQUIPMENT: (at time of discharge pending progress): Continue Skilled Therapy and Rehab.                    HISTORY:   History of Present Injury/Illness (Reason for Referral):  Per H&P, \"82yoF with a PMH of HTN, syncope, mild dementia who was recently hospitalized at [facility] after a fall and was discharged to rehab. Presents to ER from her rehab with hypotension and given IVFs but required Levophed. Had a recent UTI with E. Coli and treated with antibiotics. She has had persistent cough since December. Creatinine was elevated 2.12 and CXR is notable for possible retrocardiac infiltrate. GI consulted for N/V and EGD with severe erosive esophagitis, biopsy at EG junction, large hiatal hernia and mild gastroduodenitis. Flex sig with large hemorrhoids. Weaned off Levophed, diet advanced to clear liquids and moved to the floor. Placed on Opti-flow for shortness of breath. \"  Past Medical History/Comorbidities:   Ms. Judy Smith  has a past medical history of Arrhythmia; CAD (coronary artery disease); Chronic pain; Gastrointestinal disorder; Gastrointestinal disorder; and GERD (gastroesophageal reflux disease). Ms. Judy Smith  has a past surgical history that includes hysterectomy; breast surgery procedure unlisted; and flexible sigmoidoscopy (N/A, 2/7/2017). Social History/Living Environment:   Home Environment: Private residence  # Steps to Enter: 0  Wheelchair Ramp: Yes  One/Two Story Residence: One story  Living Alone: No  Support Systems: Family member(s)  Patient Expects to be Discharged to[de-identified] Rehabilitation facility  Current DME Used/Available at Home: Walker, rolling  Prior Level of Function/Work/Activity:  Ms. Judy Smith required assistance for all bathing and dressing and was using a rolling walker for ambulation. Daughter states that pt also has R knee pain that limits her mobility and was receiving injections for pain. She was incontinent of B/B due to her limited mobility.        Number of Personal Factors/Comorbidities that affect the Plan of Care:  Low PLOF  R knee pain  Supplemental O2 3+: HIGH COMPLEXITY   EXAMINATION:   Most Recent Physical Functioning:   Gross Assessment:  AROM: Grossly decreased, non-functional  Strength: Grossly decreased, non-functional  Sensation: Intact            RLE Strength  R Hip Flexion: 2+  R Knee Extension: 2+  R Ankle Dorsiflexion: 2+  R Ankle Plantar Flexion: 2+  LLE Strength  L Hip Flexion: 2+  L Knee Extension: 2+  L Ankle Dorsiflexion: 2+  L Ankle Plantar Flexion: 2+  Posture:     Balance:  Sitting: Impaired  Sitting - Static: Fair (occasional)  Sitting - Dynamic: Poor (constant support) Bed Mobility:  Rolling: Total assistance  Supine to Sit: Total assistance  Sit to Supine: Total assistance  Scooting: Total assistance  Wheelchair Mobility:     Transfers: N/A     Gait: N/A             Body Structures Involved:  1. Muscles Body Functions Affected:  1. Respiratory  2. Neuromusculoskeletal  3. Movement Related Activities and Participation Affected:  1. General Tasks and Demands  2. Mobility  3. Self Care   Number of elements that affect the Plan of Care: 4+: HIGH COMPLEXITY   CLINICAL PRESENTATION:   Presentation: Evolving clinical presentation with changing clinical characteristics: MODERATE COMPLEXITY   CLINICAL DECISION MAKIN [facility] Inpatient Short Form  How much difficulty does the patient currently have. .. Unable A Lot A Little None   1. Turning over in bed (including adjusting bedclothes, sheets and blankets)? [ ] 1   [X] 2   [ ] 3   [ ] 4   2. Sitting down on and standing up from a chair with arms ( e.g., wheelchair, bedside commode, etc.)   [X] 1   [ ] 2   [ ] 3   [ ] 4   3. Moving from lying on back to sitting on the side of the bed? [ ] 1   [X] 2   [ ] 3   [ ] 4   How much help from another person does the patient currently need. .. Total A Lot A Little None   4. Moving to and from a bed to a chair (including a wheelchair)? [X] 1   [ ] 2   [ ] 3   [ ] 4   5. Need to walk in hospital room? [X] 1   [ ] 2   [ ] 3   [ ] 4   6. Climbing 3-5 steps with a railing?    [X] 1   [ ] 2   [ ] 3   [ ] 4   © , Trustees [address] [address], under license to [employer]. All rights reserved    Score:  Initial: 8 Most Recent: X (Date: 2/9/17 )     Interpretation of Tool:  Represents activities that are increasingly more difficult (i.e. Bed mobility, Transfers, Gait). Score 24 23 22-20 19-15 14-10 9-7 6       Modifier CH CI CJ CK CL CM CN         · Mobility - Walking and Moving Around:               - CURRENT STATUS:    CM - 80%-99% impaired, limited or restricted               - GOAL STATUS:           CL - 60%-79% impaired, limited or restricted               - D/C STATUS:                       ---------------To be determined---------------  [place] / [place] / Product Type: Medicare /       Medical Necessity:     · Patient is expected to demonstrate progress in strength, range of motion, balance and functional technique to decrease assistance required with bed mobility and transfers and improve safety during functional activity. Reason for Services/Other Comments:  · Patient continues to require present interventions due to patient's inability to tolerate and maintain upright sitting and complete functional mobility safely without assistance.  .   Use of outcome tool(s) and clinical judgement create a POC that gives a: Questionable prediction of patient's progress: MODERATE COMPLEXITY                 TREATMENT:   (In addition to Assessment/Re-Assessment sessions the following treatments were rendered)   Pre-treatment Symptoms/Complaints:  \"I don't have any pain\"  Pain: Initial:   Pain Intensity 1: 0  Post Session:  0/10      Assessment/Reassessment only, no treatment provided today     Braces/Orthotics/Lines/Etc:   · IV  · white catheter  · O2 Device: Other (comment) (Optiflow)  Treatment/Session Assessment:    · Response to Treatment:  Pt was able to tolerate unsupported upright sitting for short period of time with minimal complaints  · Interdisciplinary Collaboration:  · Physical Therapist  · Registered Nurse  · Physician  · After treatment position/precautions:  · Supine in bed  · Bed/Chair-wheels locked  · Bed in low position  · Call light within reach  · Family at bedside  · Compliance with Program/Exercises: Will assess as treatment progresses. · Recommendations/Intent for next treatment session: \"Next visit will focus on advancements to more challenging activities and reduction in assistance provided\".   Total Treatment Duration:  PT Patient Time In/Time Out  Time In: 1100  Time Out: [address]

## 2017-02-09 NOTE — PROGRESS NOTES
Shift assessment complete. Pt resting in bed. Family at bedside. No complaints. Safety measures in place. Call light in reach.

## 2017-02-09 NOTE — PROGRESS NOTES
Reassessment completed. Pt resting comfortably in bed. Respirations even and unlabored on heated hi flow, no distress noted. Will continue to monitor.

## 2017-02-09 NOTE — PROGRESS NOTES
Problem: Nutrition Deficit  Goal: *Optimize nutritional status  Nutrition:   Acknowledge Nutrition Consult for Electrolyte Replacement Management. (Dr. Tricia Kincaid)   The patient is already being followed by nutrition at this time. Labs are remarkable for K+ 2.8. Pt is already on KCl 40mEq q 4 hrs. Nutrition Support Orders for Electrolyte Replacement Management are now activated and on the MAR. Follow-up based on standards of care. Bevely Flatter  [address], [address] [place] [address], [phone]

## 2017-02-09 NOTE — PROGRESS NOTES
Pt placed on Optiflow for increased SOB; respiratory rate approx. 30 b/min. 02/08/17 2007   Oxygen Therapy   O2 Sat (%) 100 %   Pulse via Oximetry 67 beats per minute   O2 Device Heated; Hi flow nasal cannula   O2 Flow Rate (L/min) 40 l/min   O2 Temperature 87.4 °F (30.8 °C)   FIO2 (%) 40 %

## 2017-02-09 NOTE — PROGRESS NOTES
Nanda Ewing  Admission Date: 2/5/2017             Daily Progress Note: 2/9/2017    The patient's chart is reviewed and the patient is discussed with the staff. 80yoF with a PMH of HTN, syncope, mild dementia who was recently hospitalized at [address] after a fall and was discharged to rehab. Presents to ER from her rehab with hypotension and given IVFs but required Levophed. Had a recent UTI with E. Coli and treated with antibiotics. She has had persistent cough since December. Creatinine was elevated 2.12 and CXR is notable for possible retrocardiac infiltrate. GI consulted for N/V and EGD with severe erosive esophagitis, biopsy at EG junction, large hiatal hernia and mild gastroduodenitis. Flex sig with large hemorrhoids. Weaned off Levophed, diet advanced to clear liquids and moved to the floor. Placed on Opti-flow for shortness of breath. Subjective:     Developed shortness of breath last night and placed on Opti-flow and given Morphine for increased work of breathing. Lying in bed, weak, conversant but confused with chronic dementia. Family at the bedside.     Current Facility-Administered Medications   Medication Dose Route Frequency    acetaminophen (TYLENOL) tablet 500 mg  500 mg Oral Q4H PRN    busPIRone (BUSPAR) tablet 5 mg  5 mg Oral BID    ALPRAZolam (XANAX) tablet 0.25 mg  0.25 mg Oral Q8H PRN    morphine injection 2 mg  2 mg IntraVENous Q4H PRN    metoclopramide HCl (REGLAN) injection 5 mg  5 mg IntraVENous Q6H    sodium chloride (NS) flush 5 mL  5 mL InterCATHeter Q8H    sodium chloride (NS) flush 5-10 mL  5-10 mL InterCATHeter PRN    metroNIDAZOLE (FLAGYL) IVPB premix 500 mg  500 mg IntraVENous Q8H    enoxaparin (LOVENOX) injection 30 mg  30 mg SubCUTAneous Q24H    ondansetron (ZOFRAN) injection 4 mg  4 mg IntraVENous Q6H PRN    influenza vaccine 2016-17 (36mos+)(PF) (FLUZONE/FLUARIX/FLULAVAL QUAD) injection 0.5 mL  0.5 mL IntraMUSCular PRIOR TO DISCHARGE    insulin regular (NOVOLIN R, HUMULIN R) injection   SubCUTAneous Q6H    cefTRIAXone (ROCEPHIN) 1 g in 0.9% sodium chloride (MBP/ADV) 50 mL  1 g IntraVENous Q24H    mupirocin (BACTROBAN) 2 % ointment   Topical BID    pantoprazole (PROTONIX) injection 40 mg  40 mg IntraVENous Q12H    sucralfate (CARAFATE) 100 mg/mL oral suspension 1 g  1 g Oral AC&HS    sodium chloride (NS) flush 5-10 mL  5-10 mL IntraVENous PRN       Review of Systems  Constitutional: negative for fever, chills, sweats  Cardiovascular: negative for chest pain, palpitations, syncope, edema  Gastrointestinal:  negative for dysphagia, reflux, vomiting, diarrhea, abdominal pain, or melena  Neurologic:  negative for focal weakness, numbness, headache    Objective:     Vitals:    02/08/17 2346 02/09/17 0510 02/09/17 0643 02/09/17 0743   BP: 120/77 98/58  108/59   Pulse: 89 86  92   Resp: 20 20  20   Temp: 97.5 °F (36.4 °C) 97.3 °F (36.3 °C)  98.3 °F (36.8 °C)   SpO2: 97% 99%  96%   Weight:   65.1 kg (143 lb 9.6 oz)    Height:         Intake and Output:   02/07 1901 - 02/09 0700  In: 2938.5 [P.O.:420; I.V.:2518.5]  Out: 3200 [Urine:3200]       Physical Exam:   Constitution:  the patient is weak, elderly, dementia and in no acute distress Opti-flow 40%, 40L, sat 96%  EENMT:  Sclera clear, pupils equal, oral mucosa moist  Respiratory: few scattered crackles, no wheezing  Cardiovascular:  Irregular, AFib without M,G,R  Gastrointestinal: soft and non-tender; with positive bowel sounds, nausea at times. Musculoskeletal: warm without cyanosis. There is bilateral lower leg and pedal edema.   Skin:  no jaundice or rashes, no wounds   Neurologic: no gross neuro deficits     Psychiatric:  alert and oriented x 1    CHEST XRAY: None today    CHEST XRAY 2/8/17:        LAB  Recent Labs      02/09/17   0536  02/09/17   0021  02/08/17   1732  02/08/17   1222  02/08/17   0537   GLUCPOC  99  156*  223*  105*  115*      Recent Labs      02/08/17   0349  02/07/17   0400 WBC  8.3  13.0*   HGB  9.8*  10.1*   HCT  30.0*  30.9*   PLT  277  310     Recent Labs      02/08/17   1200  02/08/17   0349  02/07/17   1835  02/07/17   0400   NA   --   143   --   141   K  3.7  3.3*  3.6  3.0*   CL   --   109*   --   107   CO2   --   19*   --   22   GLU   --   135*   --   153*   BUN   --   28*   --   32*   CREA   --   1.03*   --   1.25*   MG   --    --    --   2.0   CA   --   8.0*   --   8.0*     No results for input(s): PH, PCO2, PO2, HCO3 in the last 72 hours. No results for input(s): LCAD, LAC in the last 72 hours. Assessment:  (Medical Decision Making)     Hospital Problems  Date Reviewed: 2/9/2017          Codes Class Noted POA    Debility (Chronic) ICD-10-CM: R53.81  ICD-9-CM: 799.3  2/9/2017 Yes    PT/OT following     Esophagitis determined by endoscopy ICD-10-CM: K20.9  ICD-9-CM: 530.10  2/7/2017 Yes    On Protonix q12h    Bilateral leg edema ICD-10-CM: R60.0  ICD-9-CM: 782.3  2/6/2017 Yes    May need diuresis--checking labs    Pulmonary infiltrate ICD-10-CM: R91.8  ICD-9-CM: 793.19  2/5/2017 Yes    Remains on antobiotics          Plan:  (Medical Decision Making)     --Rocephin, Flagyl  --Blood cultures:  No growth 3 days-pending  --Pathology from polyp:  Mild chronic inflammation  --PT/OT for mobility  --Received Lasix last night and again this morning--diuresing  with 2995 ml urine output last night. --Appears volume overloaded-may need additional Lasix  --Check labs now and in AM  --Remains in AFib--may need cardiology    More than 50% of the time documented was spent in face-to-face contact with the patient and in the care of the patient on the floor/unit where the patient is located. Steffi Khan, SHANT      Lungs: decreased sounds no wheezing  Heart S1 and S2 audible, no murmers or rubs appreciated  Ext: +2 edema  Other     Very weak and not following all commands.   Spent a good deal of time with his family and told that will need to decide future care with the patient, e.g. CPR/Vent etc given her marked debility  It took almost 3 people to sit her up. They will talk and decide regarding code status. They were appreciate of our discussion today. I have spoken with and examined the patient. I have reviewed the history, examination, assessment, and plan and agree with the above. Gera Kyle MD      This note was signed electronically. Errors are unfortunately her likely due to dictation software.

## 2017-02-09 NOTE — PROGRESS NOTES
Pt in bed, sleeping, respirations present. Family at bedside. No visual distress. No complaints. Call light in reach.

## 2017-02-10 PROBLEM — I48.91 ATRIAL FIBRILLATION WITH RVR (HCC): Status: ACTIVE | Noted: 2017-02-10

## 2017-02-10 LAB
ANION GAP BLD CALC-SCNC: 8 MMOL/L (ref 7–16)
ANION GAP BLD CALC-SCNC: 9 MMOL/L (ref 7–16)
BNP SERPL-MCNC: 1365 PG/ML
BUN SERPL-MCNC: 17 MG/DL (ref 8–23)
BUN SERPL-MCNC: 17 MG/DL (ref 8–23)
CALCIUM SERPL-MCNC: 7.4 MG/DL (ref 8.3–10.4)
CALCIUM SERPL-MCNC: 7.7 MG/DL (ref 8.3–10.4)
CHLORIDE SERPL-SCNC: 108 MMOL/L (ref 98–107)
CHLORIDE SERPL-SCNC: 111 MMOL/L (ref 98–107)
CO2 SERPL-SCNC: 26 MMOL/L (ref 21–32)
CO2 SERPL-SCNC: 27 MMOL/L (ref 21–32)
CREAT SERPL-MCNC: 0.95 MG/DL (ref 0.6–1)
CREAT SERPL-MCNC: 1.06 MG/DL (ref 0.6–1)
GLUCOSE BLD STRIP.AUTO-MCNC: 109 MG/DL (ref 65–100)
GLUCOSE BLD STRIP.AUTO-MCNC: 123 MG/DL (ref 65–100)
GLUCOSE BLD STRIP.AUTO-MCNC: 157 MG/DL (ref 65–100)
GLUCOSE BLD STRIP.AUTO-MCNC: 240 MG/DL (ref 65–100)
GLUCOSE SERPL-MCNC: 122 MG/DL (ref 65–100)
GLUCOSE SERPL-MCNC: 125 MG/DL (ref 65–100)
MAGNESIUM SERPL-MCNC: 2.1 MG/DL (ref 1.8–2.4)
POTASSIUM SERPL-SCNC: 3.5 MMOL/L (ref 3.5–5.1)
POTASSIUM SERPL-SCNC: 3.5 MMOL/L (ref 3.5–5.1)
SODIUM SERPL-SCNC: 144 MMOL/L (ref 136–145)
SODIUM SERPL-SCNC: 145 MMOL/L (ref 136–145)

## 2017-02-10 PROCEDURE — 94760 N-INVAS EAR/PLS OXIMETRY 1: CPT

## 2017-02-10 PROCEDURE — 74011000250 HC RX REV CODE- 250: Performed by: INTERNAL MEDICINE

## 2017-02-10 PROCEDURE — 99232 SBSQ HOSP IP/OBS MODERATE 35: CPT | Performed by: INTERNAL MEDICINE

## 2017-02-10 PROCEDURE — 74011250637 HC RX REV CODE- 250/637: Performed by: INTERNAL MEDICINE

## 2017-02-10 PROCEDURE — 74011250636 HC RX REV CODE- 250/636: Performed by: NURSE PRACTITIONER

## 2017-02-10 PROCEDURE — C9113 INJ PANTOPRAZOLE SODIUM, VIA: HCPCS | Performed by: NURSE PRACTITIONER

## 2017-02-10 PROCEDURE — 77010033678 HC OXYGEN DAILY

## 2017-02-10 PROCEDURE — 74011250636 HC RX REV CODE- 250/636: Performed by: INTERNAL MEDICINE

## 2017-02-10 PROCEDURE — 83735 ASSAY OF MAGNESIUM: CPT | Performed by: NURSE PRACTITIONER

## 2017-02-10 PROCEDURE — 74011636637 HC RX REV CODE- 636/637: Performed by: INTERNAL MEDICINE

## 2017-02-10 PROCEDURE — 80048 BASIC METABOLIC PNL TOTAL CA: CPT | Performed by: NURSE PRACTITIONER

## 2017-02-10 PROCEDURE — 65660000000 HC RM CCU STEPDOWN

## 2017-02-10 PROCEDURE — 82962 GLUCOSE BLOOD TEST: CPT

## 2017-02-10 PROCEDURE — 74011000258 HC RX REV CODE- 258: Performed by: INTERNAL MEDICINE

## 2017-02-10 PROCEDURE — 74011250637 HC RX REV CODE- 250/637: Performed by: NURSE PRACTITIONER

## 2017-02-10 PROCEDURE — 83880 ASSAY OF NATRIURETIC PEPTIDE: CPT | Performed by: NURSE PRACTITIONER

## 2017-02-10 PROCEDURE — 93005 ELECTROCARDIOGRAM TRACING: CPT | Performed by: INTERNAL MEDICINE

## 2017-02-10 PROCEDURE — 77010033711 HC HIGH FLOW OXYGEN

## 2017-02-10 PROCEDURE — 97167 OT EVAL HIGH COMPLEX 60 MIN: CPT

## 2017-02-10 PROCEDURE — 80048 BASIC METABOLIC PNL TOTAL CA: CPT | Performed by: INTERNAL MEDICINE

## 2017-02-10 RX ORDER — POTASSIUM CHLORIDE 14.9 MG/ML
10 INJECTION INTRAVENOUS ONCE
Status: COMPLETED | OUTPATIENT
Start: 2017-02-10 | End: 2017-02-10

## 2017-02-10 RX ORDER — DIGOXIN 0.25 MG/ML
250 INJECTION INTRAMUSCULAR; INTRAVENOUS
Status: COMPLETED | OUTPATIENT
Start: 2017-02-10 | End: 2017-02-10

## 2017-02-10 RX ORDER — HYDROCORTISONE 25 MG/G
CREAM TOPICAL
Status: DISCONTINUED | OUTPATIENT
Start: 2017-02-10 | End: 2017-02-17 | Stop reason: HOSPADM

## 2017-02-10 RX ORDER — POTASSIUM CHLORIDE 20 MEQ/1
40 TABLET, EXTENDED RELEASE ORAL
Status: COMPLETED | OUTPATIENT
Start: 2017-02-10 | End: 2017-02-10

## 2017-02-10 RX ORDER — DIGOXIN 125 MCG
0.12 TABLET ORAL DAILY
Status: DISCONTINUED | OUTPATIENT
Start: 2017-02-11 | End: 2017-02-17 | Stop reason: HOSPADM

## 2017-02-10 RX ORDER — DIGOXIN 0.25 MG/ML
250 INJECTION INTRAMUSCULAR; INTRAVENOUS EVERY 4 HOURS
Status: COMPLETED | OUTPATIENT
Start: 2017-02-10 | End: 2017-02-11

## 2017-02-10 RX ORDER — FUROSEMIDE 10 MG/ML
20 INJECTION INTRAMUSCULAR; INTRAVENOUS 2 TIMES DAILY
Status: DISCONTINUED | OUTPATIENT
Start: 2017-02-10 | End: 2017-02-11

## 2017-02-10 RX ORDER — DILTIAZEM HYDROCHLORIDE 30 MG/1
30 TABLET, FILM COATED ORAL EVERY 6 HOURS
Status: DISCONTINUED | OUTPATIENT
Start: 2017-02-10 | End: 2017-02-16

## 2017-02-10 RX ADMIN — HUMAN INSULIN 2 UNITS: 100 INJECTION, SOLUTION SUBCUTANEOUS at 01:24

## 2017-02-10 RX ADMIN — HUMAN INSULIN 4 UNITS: 100 INJECTION, SOLUTION SUBCUTANEOUS at 13:20

## 2017-02-10 RX ADMIN — SUCRALFATE 1 G: 1 SUSPENSION ORAL at 16:22

## 2017-02-10 RX ADMIN — METOCLOPRAMIDE 5 MG: 5 INJECTION, SOLUTION INTRAMUSCULAR; INTRAVENOUS at 01:25

## 2017-02-10 RX ADMIN — FUROSEMIDE 20 MG: 10 INJECTION, SOLUTION INTRAMUSCULAR; INTRAVENOUS at 20:18

## 2017-02-10 RX ADMIN — ENOXAPARIN SODIUM 30 MG: 30 INJECTION SUBCUTANEOUS at 05:48

## 2017-02-10 RX ADMIN — DIGOXIN 250 MCG: 0.25 INJECTION INTRAMUSCULAR; INTRAVENOUS at 20:46

## 2017-02-10 RX ADMIN — DIGOXIN 250 MCG: 0.25 INJECTION INTRAMUSCULAR; INTRAVENOUS at 16:35

## 2017-02-10 RX ADMIN — Medication 5 ML: at 13:34

## 2017-02-10 RX ADMIN — METRONIDAZOLE 500 MG: 500 INJECTION, SOLUTION INTRAVENOUS at 01:27

## 2017-02-10 RX ADMIN — CARBIDOPA AND LEVODOPA 5 MG: 25; 100 TABLET, EXTENDED RELEASE ORAL at 08:54

## 2017-02-10 RX ADMIN — METOCLOPRAMIDE 5 MG: 5 INJECTION, SOLUTION INTRAMUSCULAR; INTRAVENOUS at 17:54

## 2017-02-10 RX ADMIN — DILTIAZEM HYDROCHLORIDE 30 MG: 30 TABLET, FILM COATED ORAL at 20:18

## 2017-02-10 RX ADMIN — SUCRALFATE 1 G: 1 SUSPENSION ORAL at 13:21

## 2017-02-10 RX ADMIN — POTASSIUM CHLORIDE 10 MEQ: 14.9 INJECTION, SOLUTION INTRAVENOUS at 16:50

## 2017-02-10 RX ADMIN — METOCLOPRAMIDE 5 MG: 5 INJECTION, SOLUTION INTRAMUSCULAR; INTRAVENOUS at 13:21

## 2017-02-10 RX ADMIN — ACETAMINOPHEN 500 MG: 500 TABLET, COATED ORAL at 15:21

## 2017-02-10 RX ADMIN — SUCRALFATE 1 G: 1 SUSPENSION ORAL at 05:48

## 2017-02-10 RX ADMIN — PANTOPRAZOLE SODIUM 40 MG: 40 INJECTION, POWDER, FOR SOLUTION INTRAVENOUS at 08:54

## 2017-02-10 RX ADMIN — METOCLOPRAMIDE 5 MG: 5 INJECTION, SOLUTION INTRAMUSCULAR; INTRAVENOUS at 05:49

## 2017-02-10 RX ADMIN — SUCRALFATE 1 G: 1 SUSPENSION ORAL at 22:04

## 2017-02-10 RX ADMIN — CARBIDOPA AND LEVODOPA 5 MG: 25; 100 TABLET, EXTENDED RELEASE ORAL at 18:00

## 2017-02-10 RX ADMIN — CEFTRIAXONE 1 G: 1 INJECTION, POWDER, FOR SOLUTION INTRAMUSCULAR; INTRAVENOUS at 22:04

## 2017-02-10 RX ADMIN — FUROSEMIDE 20 MG: 10 INJECTION, SOLUTION INTRAMUSCULAR; INTRAVENOUS at 13:21

## 2017-02-10 RX ADMIN — Medication 5 ML: at 22:04

## 2017-02-10 RX ADMIN — MUPIROCIN: 20 OINTMENT TOPICAL at 17:53

## 2017-02-10 RX ADMIN — METRONIDAZOLE 500 MG: 500 INJECTION, SOLUTION INTRAVENOUS at 16:22

## 2017-02-10 RX ADMIN — Medication 10 ML: at 01:30

## 2017-02-10 RX ADMIN — METRONIDAZOLE 500 MG: 500 INJECTION, SOLUTION INTRAVENOUS at 08:54

## 2017-02-10 RX ADMIN — MUPIROCIN: 20 OINTMENT TOPICAL at 08:54

## 2017-02-10 RX ADMIN — PANTOPRAZOLE SODIUM 40 MG: 40 INJECTION, POWDER, FOR SOLUTION INTRAVENOUS at 22:04

## 2017-02-10 RX ADMIN — Medication 5 ML: at 05:50

## 2017-02-10 RX ADMIN — POTASSIUM CHLORIDE 40 MEQ: 20 TABLET, EXTENDED RELEASE ORAL at 16:22

## 2017-02-10 NOTE — PROGRESS NOTES
Bedside EKG done and seen per Dr. Ruddy Calvillo. Digoxin 250mcg slow IVP given. Potassium 40meq PO given. Awaiting IV dose of potassium.

## 2017-02-10 NOTE — PROGRESS NOTES
BMP drawn per order. Awaiting results. IV dose of potassium 10meq started per order. Sanjay Boyer Supervisor aware of transfer orders for telemetry bed noted. Awaiting bed. Pt and family aware. Will continue to monitor.

## 2017-02-10 NOTE — PROGRESS NOTES
Problem: Nutrition Deficit  Goal: *Optimize nutritional status  Nutrition F/U: Day 5  Assessment:   Diet order(s): GI soft  Food/Nutrition History: Pt seen in company of multiple family members. Pt and family report nausea and vomiting have subsided by pt remains with poor appetite. Pt enjoys the ensure clear supplements and will tolerate the ensure enlives. Pt is s/p EGD that showed severe erosive esophagitis, large hiatal hernia and mild gastroduodenitis. Pt is also s/p flex sig that only showed large hemorrhoids. Anthropometrics: Height: 5' 3\" (160 cm), Weight: 69.85 kg (153 lb 12 oz), Weight Source: Bed, Body mass index is 27 kg/(m^2). BMI class of healthy weight for age >71. Edema is noted at 1+ to BLE. Macronutrient needs (revised):  EER: 5033-4556 kcal /day (20-25 kcal/kg ABW)  EPR: 52-63 grams protein/day (1-1.2 grams/kg IBW)  Intake/Comparative Standards: Current intake from past 5 recorded meals is: 7%. This does not meet estimated kcal or protein needs. Intervention:  Meals and snacks: Continue GI soft diet and advance as able  Medical Nutrition Supplements: continue ensure clear tid and add ensure enlive BID     Batool Melton Michele [address], [address] [place] [address], [phone]

## 2017-02-10 NOTE — PROGRESS NOTES
Bedside report received from night nurse Elizabeth Preston. Assessment done as noted  Respiration even and unlabored 20/min; denies pain or nausea at present. [place] intact and patent draining yellow urine. Remains on AirVO. door open. Encouraged to call with needs.

## 2017-02-10 NOTE — PROGRESS NOTES
Problem: Self Care Deficits Care Plan (Adult)  Goal: *Acute Goals and Plan of Care (Insert Text)  1. Patient will perform bathing with moderate assistance within 7 days with equipment as needed. 2. Patient will perform upper body dressing with moderate assistance within 7 days with equipment as needed. 3. Patient will perform toileting with maximal assistance within 7 days with equipment as needed. 4. Patient will perform toilet transfer with maximal assistance within 7 days with equipment as needed. 5. Pt will participate in B UE therapeutic exercises for 8 minutes with 3 rest breaks within 7 days. OCCUPATIONAL THERAPY: Initial Assessment and AM 2/10/2017  INPATIENT: Hospital Day: 6  [place] / [place] / Product Type: Medicare /      NAME/AGE/GENDER: Garrison Rico is a 80 y.o. female              PRIMARY DIAGNOSIS:  Nausea and vomiting, intractability of vomiting not specified, unspecified vomiting type [R11.2]  Rectal mass [K62.9] Septic shock (HCC) Septic shock (HCC)  Procedure(s) (LRB):  ESOPHAGOGASTRODUODENOSCOPY (EGD) At bedside (N/A)  SIGMOIDOSCOPY FLEXIBLE at bedside (N/A)  ESOPHAGOGASTRODUODENAL (EGD) BIOPSY (N/A)  3 Days Post-Op  ICD-10: Treatment Diagnosis:        · Stiffness of Left Shoulder, Not elsewhere classified (M25.612)  · Generalized Muscle Weakness (M62.81)  · History of falling (Z91.81)   Precautions/Allergies:        Fall, Aspirin and Pcn [penicillins]       ASSESSMENT:      Ms. Ava Lewis admitted with above diagnosis. Patient supine in bed on hi flow O2; family present. O2 sats at 92% with patient supine. Patient\"s daughter and son in law live with her. Patient required assistance with bathing and dressing, however patient able to ambulate house hold distances using RW prior to admit. Patient oriented to person and place. Patient was total assistance for supine to sit.   Patient sat on edge of bed for 5 minutes with min  assistance for sitting balance. Patient's O2 sats increased to 97% sitting on the edge of the bed. Patient did squat with maximal assist and sidestepped towards the head of the bed 1 of 3 trials. Patient functioning below baseline. Patient to benefit from Occupational Therapy to maximize ADL performance. This section established at most recent assessment   PROBLEM LIST (Impairments causing functional limitations):  1. Decreased Strength  2. Decreased ADL/Functional Activities  3. Decreased Transfer Abilities  4. Decreased Ambulation Ability/Technique  5. Decreased Balance  6. Increased Pain  7. Decreased Activity Tolerance  8. Increased Fatigue  9. Decreased Flexibility/Joint Mobility  10. Edema/Girth  11. Decreased Knowledge of Precautions  12. Decreased Cognition    INTERVENTIONS PLANNED: (Benefits and precautions of occupational therapy have been discussed with the patient.)  1. Activities of daily living training  2. Adaptive equipment training  3. Balance training  4. Clothing management  5. Cognitive training  6. Donning&doffing training  7. Group therapy  8. Hygiene training  9. Neuromuscular re-eduation  10. Re-evaluation  11. Sensory reintergration training  12. Theraputic activity  13. Theraputic exercise      TREATMENT PLAN: Frequency/Duration: Follow patient 3x's/week to address above goals. Rehabilitation Potential For Stated Goals: GOOD      RECOMMENDED REHABILITATION/EQUIPMENT: (at time of discharge pending progress): Continue Skilled Therapy. OCCUPATIONAL PROFILE AND HISTORY:   History of Present Injury/Illness (Reason for Referral):  Darshana Saldana is an 80yoF with a PMH of htn, syncope, mild dementia who was recently hospitalized at [facility] and was discharged to rehab. Today, she was sent to the Emergency Room from her rehab facility at [facility] for hypotension. The patient's family reports that 4 weeks ago, the patient had a mechanical fall requiring EMS to be called.  She was discharged to a rehab facility at that time when she had previously been living with her son. Subsequently, she developed vomiting and inability to keep food down. During hospital evaluation was found to have hypokalemia thought due to Lasix and diarrhea and was discharged from [facility] after treatment for UTI (pansensitive E. Coli). She has sought medical care for these symptoms on multiple occasions (ER facilities mainly) and has been given IVF and told she has a UTI more than once in the last month. She has taken antibiotics for UTI, though family can't remember which ones, and her vomiting has persisted despite this. Past Medical History/Comorbidities:   Ms. Haider Scherer  has a past medical history of Arrhythmia; CAD (coronary artery disease); Chronic pain; Gastrointestinal disorder; Gastrointestinal disorder; and GERD (gastroesophageal reflux disease). Ms. Haider Scherer  has a past surgical history that includes hysterectomy; breast surgery procedure unlisted; and flexible sigmoidoscopy (N/A, 2/7/2017). Social History/Living Environment:   Home Environment: Private residence  # Steps to Enter: 0  Wheelchair Ramp: Yes  One/Two Story Residence: One story  Living Alone: No  Support Systems: Family member(s)  Patient Expects to be Discharged to[de-identified] Rehabilitation facility  Current DME Used/Available at Home: Walker, rolling  Tub or Shower Type: Shower  Prior Level of Function/Work/Activity:  Patient required assistance with bathing and dressing, however patient able to ambulate household distances using RW. · Number of Personal Factors/Comorbidities that affect the Plan of Care  · Mild dementia  · Syncope  · Fall  · Chronic pain Extensive review of physical, cognitive, and psychosocial performance (3+):  HIGH COMPLEXITY   ASSESSMENT OF OCCUPATIONAL PERFORMANCE[de-identified]   Activities of Daily Living:           Basic ADLs (From Assessment) Complex ADLs (From Assessment)   Basic ADL  Feeding:  Moderate assistance  Oral Facial Hygiene/Grooming: Maximum assistance  Bathing: Maximum assistance  Upper Body Dressing: Total assistance  Lower Body Dressing: Total assistance  Toileting: Total assistance Instrumental ADL  Meal Preparation: Total assistance  Homemaking: Total assistance  Medication Management: Total assistance  Financial Management: Total assistance   Grooming/Bathing/Dressing Activities of Daily Living     Cognitive Retraining  Safety/Judgement: Fall prevention                       Bed/Mat Mobility  Rolling: Total assistance  Supine to Sit: Total assistance  Sit to Supine: Total assistance  Sit to Stand:  (Patient was max A to squat & sidestep to left 1of 3 trials)  Scooting: Total assistance          Most Recent Physical Functioning:   Gross Assessment:                  Posture:     Balance:  Sitting: Impaired  Sitting - Static: Fair (occasional)  Sitting - Dynamic: Poor (constant support) Bed Mobility:  Rolling: Total assistance  Supine to Sit: Total assistance  Sit to Supine: Total assistance  Scooting: Total assistance  Wheelchair Mobility:     Transfers:  Sit to Stand:  (Patient was max A to squat & sidestep to left 1of 3 trials)                    Patient Vitals for the past 6 hrs:       BP BP Patient Position SpO2 O2 Flow Rate (L/min) Pulse   02/10/17 1150 - - 97 % - -   02/10/17 1238 114/76 Head of bed elevated (Comment degrees) 99 % - (!) 107   02/10/17 1421 - - 97 % 40 l/min -        Mental Status  Neurologic State: Alert  Orientation Level: Oriented to person, Disoriented to situation, Disoriented to time, Oriented to place  Cognition: Decreased command following  Safety/Judgement: Fall prevention                               Physical Skills Involved:  1. Range of Motion  2. Balance  3. Mobility  4. Strength  5. Endurance Cognitive Skills Affected (resulting in the inability to perform in a timely and safe manner):  1. Attending  2. Perceiving  3. Thinking  4. Understanding  5. Problem Solving  6.  Mental Sequencing  7. Learning  8. Remembering Psychosocial Skills Affected:  1. Habits  2. Routines and Behaviors   Number of elements that affect the Plan of Care: 5+:  HIGH COMPLEXITY   CLINICAL DECISION MAKIN[address] [place] [address] AM-PAC 6 Clicks   Basic Mobility Inpatient Short Form  How much help from another person does the patient currently need. .. Total A Lot A Little None   1. Putting on and taking off regular lower body clothing? [X] 1   [ ] 2   [ ] 3   [ ] 4   2. Bathing (including washing, rinsing, drying)? [ ] 1   [X] 2   [ ] 3   [ ] 4   3. Toileting, which includes using toilet, bedpan or urinal?   [X] 1   [ ] 2   [ ] 3   [ ] 4   4. Putting on and taking off regular upper body clothing?   [ ] 1   [X] 2   [ ] 3   [ ] 4   5. Taking care of personal grooming such as brushing teeth? [ ] 1   [X] 2   [ ] 3   [ ] 4   6. Eating meals? [ ] 1   [X] 2   [ ] 3   [ ] 4   © , Trustees of [address] [place] [id number], under license to [employer]. All rights reserved    Score:  Initial: CL Most Recent: X (Date: -- )     Interpretation of Tool:  Represents activities that are increasingly more difficult (i.e. Bed mobility, Transfers, Gait). Score 24 23 22-20 19-15 14-10 9-7 6       Modifier CH CI CJ CK CL CM CN         · Self Care:               - CURRENT STATUS:    CL - 60%-79% impaired, limited or restricted               - GOAL STATUS:           CK - 40%-59% impaired, limited or restricted               - D/C STATUS:                       ---------------To be determined---------------  [place] / [place] / Product Type: Medicare /       Medical Necessity:     · Patient demonstrates good rehab potential due to higher previous functional level. Reason for Services/Other Comments:  · Patient continues to require skilled intervention due to patient's inability to take care of self.    Use of outcome tool(s) and clinical judgement create a POC that gives a: HIGH COMPLEXITY             TREATMENT:   (In addition to Assessment/Re-Assessment sessions the following treatments were rendered)      Pre-treatment Symptoms/Complaints:    Pain: Initial:   Pain Intensity 1: 0  Post Session:        Assessment/Reassessment only, no treatment provided today     Braces/Orthotics/Lines/Etc:   · IV  · O2 Device: Heated, Hi flow nasal cannula  Treatment/Session Assessment:    · Response to Treatment:  Patient tolerated treatment well  · Interdisciplinary Collaboration:  · Physical Therapist  · Registered Nurse  · After treatment position/precautions:  · Supine in bed  · Bed/Chair-wheels locked  · Bed in low position  · Call light within reach  · RN notified  · Family at bedside  · Compliance with Program/Exercises: Will assess as treatment progresses. · Recommendations/Intent for next treatment session: \"Next visit will focus on advancements to more challenging activities and reduction in assistance provided\".   Total Treatment Duration:  OT Patient Time In/Time Out  Time In: 1120  Time Out: [address], OT

## 2017-02-10 NOTE — PROGRESS NOTES
Monitor room report HR in the 40s. On assessment, Apical pulse 98. Called monitor tech, who now reports HR 72. Informed monitor tech that apical pulse was just counted at 98. Verified box number and correct patient. Replaced tele pad on one lead.

## 2017-02-10 NOTE — PROGRESS NOTES
Panda Guaman  Admission Date: 2/5/2017             Daily Progress Note: 2/10/2017    The patient's chart is reviewed and the patient is discussed with the staff. 80yoF with a PMH of HTN, syncope, mild dementia who was recently hospitalized at [facility] after a fall and was discharged to rehab. Presents to ER from her rehab with hypotension and given IVFs but required Levophed. Had a recent UTI with E. Coli and treated with antibiotics. She has had persistent cough since December. Creatinine was elevated 2.12 and CXR is notable for possible retrocardiac infiltrate. GI consulted for N/V and EGD with severe erosive esophagitis, biopsy at EG junction, large hiatal hernia and mild gastroduodenitis. Flex sig with large hemorrhoids. Weaned off Levophed, diet advanced to clear liquids and moved to the floor. Placed on Opti-flow for shortness of breath. Subjective:     Patient in bed on airvo currently. More alert today. Daughter in law in the room and reports now here  and sister have agreed to make the Patient DNR. Patient reports awake but feels miserable.      Current Facility-Administered Medications   Medication Dose Route Frequency    NUTRITIONAL SUPPORT ELECTROLYTE PRN ORDERS   Does Not Apply PRN    acetaminophen (TYLENOL) tablet 500 mg  500 mg Oral Q4H PRN    busPIRone (BUSPAR) tablet 5 mg  5 mg Oral BID    ALPRAZolam (XANAX) tablet 0.25 mg  0.25 mg Oral Q8H PRN    morphine injection 2 mg  2 mg IntraVENous Q4H PRN    metoclopramide HCl (REGLAN) injection 5 mg  5 mg IntraVENous Q6H    sodium chloride (NS) flush 5 mL  5 mL InterCATHeter Q8H    sodium chloride (NS) flush 5-10 mL  5-10 mL InterCATHeter PRN    metroNIDAZOLE (FLAGYL) IVPB premix 500 mg  500 mg IntraVENous Q8H    enoxaparin (LOVENOX) injection 30 mg  30 mg SubCUTAneous Q24H    ondansetron (ZOFRAN) injection 4 mg  4 mg IntraVENous Q6H PRN    influenza vaccine 2016-17 (36mos+)(PF) (FLUZONE/FLUARIX/FLULAVAL QUAD) injection 0.5 mL  0.5 mL IntraMUSCular PRIOR TO DISCHARGE    insulin regular (NOVOLIN R, HUMULIN R) injection   SubCUTAneous Q6H    cefTRIAXone (ROCEPHIN) 1 g in 0.9% sodium chloride (MBP/ADV) 50 mL  1 g IntraVENous Q24H    mupirocin (BACTROBAN) 2 % ointment   Topical BID    pantoprazole (PROTONIX) injection 40 mg  40 mg IntraVENous Q12H    sucralfate (CARAFATE) 100 mg/mL oral suspension 1 g  1 g Oral AC&HS    sodium chloride (NS) flush 5-10 mL  5-10 mL IntraVENous PRN       Review of Systems  Constitutional: negative for fever, chills, sweats  Cardiovascular: negative for chest pain, palpitations, syncope, edema  Gastrointestinal:  negative for dysphagia, reflux, vomiting, diarrhea, abdominal pain, or melena  Neurologic:  negative for focal weakness, numbness, headache    Objective:     Vitals:    02/10/17 0336 02/10/17 0348 02/10/17 0426 02/10/17 0731   BP: 110/42 96/62  119/79   Pulse: 94   93   Resp: 18   20   Temp: 97.9 °F (36.6 °C)   97.6 °F (36.4 °C)   SpO2: 97%  96% 99%   Weight:       Height:         Intake and Output:   02/08 1901 - 02/10 0700  In: 80 [P.O.:80]  Out: 3300 [Urine:3300]  02/10 0701 - 02/10 1900  In: 480 [P.O.:480]  Out: -     Physical Exam:   Constitution:  the patient is weak, elderly, dementia and in no acute distress Opti-flow 35 LPM, 40%,  EENMT:  Sclera clear, pupils equal, oral mucosa moist  Respiratory: mild tachypnea. Cardiovascular:  Irregular, AFib without M,G,R  Gastrointestinal: soft and non-tender; with positive bowel sounds, nausea at times. Musculoskeletal: warm without cyanosis. There is bilateral lower leg and pedal edema.   Skin:  no jaundice or rashes, no wounds   Neurologic: no gross neuro deficits     Psychiatric:  alert and oriented x 1    CHEST XRAY: None today        LAB  Recent Labs      02/10/17   0538  02/10/17   0025  02/09/17   1614  02/09/17   1156  02/09/17   0536   GLUCPOC  109*  157*  153*  159*  99      Recent Labs      02/08/17   0349   WBC 8. 3   HGB  9.8*   HCT  30.0*   PLT  277     Recent Labs      02/10/17   0600  02/09/17   2200  02/09/17   1135   NA  145  143  143   K  3.5  4.2  2.8*   CL  111*  109*  108*   CO2  26  25  24   GLU  122*  195*  171*   BUN  17  19  22   CREA  0.95  1.15*  1.08*   MG  2.1  2.2  1.8   CA  7.4*  7.6*  8.1*   BNPP  1365   --   1349     No results for input(s): PH, PCO2, PO2, HCO3 in the last 72 hours. No results for input(s): LCAD, LAC in the last 72 hours. Assessment:  (Medical Decision Making)     Hospital Problems  Date Reviewed: 2/9/2017          Codes Class Noted POA    Debility (Chronic) ICD-10-CM: R53.81  ICD-9-CM: 799.3  2/9/2017 Yes    PT/OT following     Esophagitis determined by endoscopy ICD-10-CM: K20.9  ICD-9-CM: 530.10  2/7/2017 Yes    On Protonix q12h    Bilateral leg edema ICD-10-CM: R60.0  ICD-9-CM: 782.3  2/6/2017 Yes    May need diuresis--checking labs    Pulmonary infiltrate ICD-10-CM: R91.8  ICD-9-CM: 793.19  2/5/2017 Yes    Remains on antobiotics        Acute hypoxemic respiratory failure  --see below    Plan:  (Medical Decision Making)     --give lasix BID f/u I's and O's.  --made DNR per discussion with family today. --continue abx -- Rocephin, Flagyl. Cultures are negative. --continue airvo for now  --PT/OT for mobility  --Remains in AFib--may need cardiology  --continue remaining treatment. More than 50% of the time documented was spent in face-to-face contact with the patient and in the care of the patient on the floor/unit where the patient is located. Kiara Johnson MD      This note was signed electronically.

## 2017-02-10 NOTE — PROGRESS NOTES
EKG with AFIB, to get dig now and then transfer to telemetry. Will get cardiology to see as well.   Key Ledesma MD

## 2017-02-10 NOTE — PROGRESS NOTES
HR up to 160's per nursing, will get EKG and BMP. K+ was lower earlier.  Will give k+ now    Selene Zuñiga MD

## 2017-02-10 NOTE — PROGRESS NOTES
Monitor tech reports patient with sinus tach and A-fib with heart rate 150s - 160s. Pt assessed. In bed resting with eyes close. No distress noted. Denies chest pain. Family at bedside. Dr. Kim Cooper made aware. Awaiting orders.

## 2017-02-10 NOTE — PROGRESS NOTES
Received bedside shift report from [facility], RN. Pt lying in bed. Multiple family members at bedside. Pt alert and oriented. No apparent distress. Respirations even and unlabored. Instructed to call for assistance with needs, as they arise. Pt voiced understanding. Thermostat adjusted for comfort.

## 2017-02-10 NOTE — CONSULTS
[facility] Cardiology Consult                Date of  Admission: 2/5/2017  8:12 PM     Primary Care Physician:  Dr. Mayito Child  Primary Cardiologist:  None  Referring Physician:  Dr Angie Amador Physician:  Dr. Zbigniew Wood    CC/Reason for consult:  A fib with RVR      Louvenia Rinne is a 80 y.o. female with PMH of dementia, HTN, GERD, and syncope, who presented with weakness, hypotension and vomiting with inability to eat. In the ED labs revealed leukocytosis with  WBC of 15.3, ARF with creatinine of 2.12, BNP of 1992 lactic of 1.6 and procal of 0.2. CXR showed LLL infiltrate. She was admitted for treatment of septic shock. In ICU she required levophed briefly. GI was consulted for her N&V and EGD was performed revealing moderately severe erosive esophagitis, polyp at EG junction, large hiatal hernia and mild gastroduodenitis. Patient was weaned off pressors and transferred to the floor. She remains on airvo but is more alert today. Her HR was noted to elevated at 160. She was in afib per monitor with EKG confirming A fib with RVR. She has no history of CAD, CHF, or arrhythmias.       Patient Active Problem List   Diagnosis Code    Pulmonary infiltrate R91.8    Bilateral leg edema R60.0    Esophagitis determined by endoscopy K20.9    Debility R53.81       Past Medical History   Diagnosis Date    Arrhythmia      by history/ not at present    CAD (coronary artery disease)      mitral valve prolapse    Chronic pain     Gastrointestinal disorder      GERD    Gastrointestinal disorder      gallstones    GERD (gastroesophageal reflux disease)       Past Surgical History   Procedure Laterality Date    Hx hysterectomy      Pr breast surgery procedure unlisted       lumpectomy on RT breast    Flexible sigmoidoscopy N/A 2/7/2017     SIGMOIDOSCOPY FLEXIBLE at bedside performed by Tejinder Swanson MD at [facility] ENDOSCOPY     Allergies   Allergen Reactions    Aspirin Nausea and Vomiting    Pcn [Penicillins] Itching Family History   Problem Relation Age of Onset    Diabetes Mother     Hypertension Mother     Breast Cancer Neg Hx         Current Facility-Administered Medications   Medication Dose Route Frequency    furosemide (LASIX) injection 20 mg  20 mg IntraVENous BID    potassium chloride 10 mEq in 50 ml IVPB  10 mEq IntraVENous ONCE    NUTRITIONAL SUPPORT ELECTROLYTE PRN ORDERS   Does Not Apply PRN    acetaminophen (TYLENOL) tablet 500 mg  500 mg Oral Q4H PRN    busPIRone (BUSPAR) tablet 5 mg  5 mg Oral BID    ALPRAZolam (XANAX) tablet 0.25 mg  0.25 mg Oral Q8H PRN    morphine injection 2 mg  2 mg IntraVENous Q4H PRN    metoclopramide HCl (REGLAN) injection 5 mg  5 mg IntraVENous Q6H    sodium chloride (NS) flush 5 mL  5 mL InterCATHeter Q8H    sodium chloride (NS) flush 5-10 mL  5-10 mL InterCATHeter PRN    metroNIDAZOLE (FLAGYL) IVPB premix 500 mg  500 mg IntraVENous Q8H    enoxaparin (LOVENOX) injection 30 mg  30 mg SubCUTAneous Q24H    ondansetron (ZOFRAN) injection 4 mg  4 mg IntraVENous Q6H PRN    influenza vaccine 2016-17 (36mos+)(PF) (FLUZONE/FLUARIX/FLULAVAL QUAD) injection 0.5 mL  0.5 mL IntraMUSCular PRIOR TO DISCHARGE    insulin regular (NOVOLIN R, HUMULIN R) injection   SubCUTAneous Q6H    cefTRIAXone (ROCEPHIN) 1 g in 0.9% sodium chloride (MBP/ADV) 50 mL  1 g IntraVENous Q24H    mupirocin (BACTROBAN) 2 % ointment   Topical BID    pantoprazole (PROTONIX) injection 40 mg  40 mg IntraVENous Q12H    sucralfate (CARAFATE) 100 mg/mL oral suspension 1 g  1 g Oral AC&HS    sodium chloride (NS) flush 5-10 mL  5-10 mL IntraVENous PRN       Review of Systems   Constitutional: Positive for malaise/fatigue and weight loss. Eyes: Negative. Respiratory: Positive for cough and shortness of breath. Cardiovascular: Negative. Gastrointestinal: Negative. Genitourinary: Negative. Musculoskeletal: Negative. Skin: Negative. Neurological: Positive for weakness and headaches. Endo/Heme/Allergies: Negative. Psychiatric/Behavioral: Negative. Physical Exam  Vitals:    02/10/17 1150 02/10/17 1238 02/10/17 1421 02/10/17 1527   BP:  114/76  129/74   Pulse:  (!) 107  (!) 105   Resp:  20  18   Temp:  97.3 °F (36.3 °C)  97.1 °F (36.2 °C)   SpO2: 97% 99% 97% 95%   Weight:       Height:           Physical Exam:  Physical Exam   Constitutional: She appears unhealthy. HENT:   Head: Normocephalic. Eyes: Pupils are equal, round, and reactive to light. Neck: Normal range of motion. Cardiovascular: An irregularly irregular rhythm present. Tachycardia present. Pulmonary/Chest: Tachypnea noted. She has rhonchi. Abdominal: Soft. Bowel sounds are normal.   Musculoskeletal: She exhibits edema. Neurological: She is alert. Skin: Skin is warm and dry. Psychiatric: Affect normal.       Cardiographics    Telemetry: AFIB  ECG: atrial fibrillation, rate 160    Labs:   Recent Labs      02/10/17   0600  02/09/17   2200   02/08/17   0349   NA  145  143   < >  143   K  3.5  4.2   < >  3.3*   MG  2.1  2.2   < >   --    BUN  17  19   < >  28*   CREA  0.95  1.15*   < >  1.03*   GLU  122*  195*   < >  135*   WBC   --    --    --   8.3   HGB   --    --    --   9.8*   HCT   --    --    --   30.0*   PLT   --    --    --   277    < > = values in this interval not displayed. Assessment/Plan:     Assessment:          Pulmonary infiltrate-- pulmonary following. Bilateral leg edema -- improved with diuresis      Esophagitis determined by endoscopy-- on PPi      Debility-- PT/OT seeing      Atrial fibrillation with RVR -- give IV digoxin x 2 doses, start oral digoxin 0.125 mcg in AM.  Start cardizem 30 mg PO Q6 h. No transfer to telemetry at this time. Monitor HR and vital signs. Thank you very much for this referral. We appreciate the opportunity to participate in this patient's care. We will follow along with above stated plan.     Kieran Correia NP  Consulting MD: Les Escobar

## 2017-02-11 ENCOUNTER — APPOINTMENT (OUTPATIENT)
Dept: GENERAL RADIOLOGY | Age: 82
DRG: 871 | End: 2017-02-11
Attending: INTERNAL MEDICINE
Payer: MEDICARE

## 2017-02-11 LAB
ANION GAP BLD CALC-SCNC: 10 MMOL/L (ref 7–16)
ATRIAL RATE: 147 BPM
BACTERIA SPEC CULT: NORMAL
BACTERIA SPEC CULT: NORMAL
BUN SERPL-MCNC: 14 MG/DL (ref 8–23)
CALCIUM SERPL-MCNC: 7.8 MG/DL (ref 8.3–10.4)
CALCULATED P AXIS, ECG09: NORMAL DEGREES
CALCULATED R AXIS, ECG10: -88 DEGREES
CALCULATED T AXIS, ECG11: -58 DEGREES
CHLORIDE SERPL-SCNC: 108 MMOL/L (ref 98–107)
CO2 SERPL-SCNC: 26 MMOL/L (ref 21–32)
CREAT SERPL-MCNC: 0.93 MG/DL (ref 0.6–1)
DIAGNOSIS, 93000: NORMAL
DIASTOLIC BP, ECG02: NORMAL MMHG
GLUCOSE BLD STRIP.AUTO-MCNC: 120 MG/DL (ref 65–100)
GLUCOSE BLD STRIP.AUTO-MCNC: 147 MG/DL (ref 65–100)
GLUCOSE BLD STRIP.AUTO-MCNC: 149 MG/DL (ref 65–100)
GLUCOSE BLD STRIP.AUTO-MCNC: 178 MG/DL (ref 65–100)
GLUCOSE BLD STRIP.AUTO-MCNC: 197 MG/DL (ref 65–100)
GLUCOSE SERPL-MCNC: 133 MG/DL (ref 65–100)
MAGNESIUM SERPL-MCNC: 1.8 MG/DL (ref 1.8–2.4)
P-R INTERVAL, ECG05: NORMAL MS
PHOSPHATE SERPL-MCNC: 1.1 MG/DL (ref 2.3–3.7)
POTASSIUM SERPL-SCNC: 3.5 MMOL/L (ref 3.5–5.1)
Q-T INTERVAL, ECG07: 288 MS
QRS DURATION, ECG06: 72 MS
QTC CALCULATION (BEZET), ECG08: 456 MS
SERVICE CMNT-IMP: NORMAL
SERVICE CMNT-IMP: NORMAL
SODIUM SERPL-SCNC: 144 MMOL/L (ref 136–145)
SYSTOLIC BP, ECG01: NORMAL MMHG
VENTRICULAR RATE, ECG03: 151 BPM

## 2017-02-11 PROCEDURE — 77030027138 HC INCENT SPIROMETER -A

## 2017-02-11 PROCEDURE — 74011636637 HC RX REV CODE- 636/637: Performed by: INTERNAL MEDICINE

## 2017-02-11 PROCEDURE — 99232 SBSQ HOSP IP/OBS MODERATE 35: CPT | Performed by: INTERNAL MEDICINE

## 2017-02-11 PROCEDURE — 84100 ASSAY OF PHOSPHORUS: CPT | Performed by: INTERNAL MEDICINE

## 2017-02-11 PROCEDURE — 74011250636 HC RX REV CODE- 250/636: Performed by: INTERNAL MEDICINE

## 2017-02-11 PROCEDURE — 71010 XR CHEST PORT: CPT

## 2017-02-11 PROCEDURE — 74011250637 HC RX REV CODE- 250/637: Performed by: INTERNAL MEDICINE

## 2017-02-11 PROCEDURE — 74011000258 HC RX REV CODE- 258: Performed by: INTERNAL MEDICINE

## 2017-02-11 PROCEDURE — 74011250637 HC RX REV CODE- 250/637: Performed by: NURSE PRACTITIONER

## 2017-02-11 PROCEDURE — 77010033711 HC HIGH FLOW OXYGEN

## 2017-02-11 PROCEDURE — 74011250636 HC RX REV CODE- 250/636: Performed by: NURSE PRACTITIONER

## 2017-02-11 PROCEDURE — 74011000250 HC RX REV CODE- 250: Performed by: INTERNAL MEDICINE

## 2017-02-11 PROCEDURE — C9113 INJ PANTOPRAZOLE SODIUM, VIA: HCPCS | Performed by: NURSE PRACTITIONER

## 2017-02-11 PROCEDURE — 94760 N-INVAS EAR/PLS OXIMETRY 1: CPT

## 2017-02-11 PROCEDURE — 65660000000 HC RM CCU STEPDOWN

## 2017-02-11 PROCEDURE — 77010033678 HC OXYGEN DAILY

## 2017-02-11 PROCEDURE — 83735 ASSAY OF MAGNESIUM: CPT | Performed by: INTERNAL MEDICINE

## 2017-02-11 PROCEDURE — 80048 BASIC METABOLIC PNL TOTAL CA: CPT | Performed by: INTERNAL MEDICINE

## 2017-02-11 PROCEDURE — 65270000029 HC RM PRIVATE

## 2017-02-11 PROCEDURE — 93306 TTE W/DOPPLER COMPLETE: CPT

## 2017-02-11 RX ORDER — FUROSEMIDE 10 MG/ML
40 INJECTION INTRAMUSCULAR; INTRAVENOUS 2 TIMES DAILY
Status: DISCONTINUED | OUTPATIENT
Start: 2017-02-11 | End: 2017-02-12

## 2017-02-11 RX ORDER — POTASSIUM CHLORIDE 20 MEQ/1
40 TABLET, EXTENDED RELEASE ORAL EVERY 4 HOURS
Status: DISCONTINUED | OUTPATIENT
Start: 2017-02-11 | End: 2017-02-11

## 2017-02-11 RX ORDER — ENOXAPARIN SODIUM 100 MG/ML
40 INJECTION SUBCUTANEOUS EVERY 24 HOURS
Status: DISCONTINUED | OUTPATIENT
Start: 2017-02-12 | End: 2017-02-17 | Stop reason: HOSPADM

## 2017-02-11 RX ORDER — MAGNESIUM SULFATE HEPTAHYDRATE 40 MG/ML
2 INJECTION, SOLUTION INTRAVENOUS ONCE
Status: COMPLETED | OUTPATIENT
Start: 2017-02-11 | End: 2017-02-11

## 2017-02-11 RX ORDER — SPIRONOLACTONE 25 MG/1
25 TABLET ORAL 2 TIMES DAILY
Status: DISCONTINUED | OUTPATIENT
Start: 2017-02-11 | End: 2017-02-17 | Stop reason: HOSPADM

## 2017-02-11 RX ADMIN — DILTIAZEM HYDROCHLORIDE 30 MG: 30 TABLET, FILM COATED ORAL at 08:15

## 2017-02-11 RX ADMIN — DILTIAZEM HYDROCHLORIDE 30 MG: 30 TABLET, FILM COATED ORAL at 02:29

## 2017-02-11 RX ADMIN — METOCLOPRAMIDE 5 MG: 5 INJECTION, SOLUTION INTRAMUSCULAR; INTRAVENOUS at 00:05

## 2017-02-11 RX ADMIN — HUMAN INSULIN 2 UNITS: 100 INJECTION, SOLUTION SUBCUTANEOUS at 16:53

## 2017-02-11 RX ADMIN — METRONIDAZOLE 500 MG: 500 INJECTION, SOLUTION INTRAVENOUS at 08:15

## 2017-02-11 RX ADMIN — Medication 5 ML: at 13:40

## 2017-02-11 RX ADMIN — DILTIAZEM HYDROCHLORIDE 30 MG: 30 TABLET, FILM COATED ORAL at 13:41

## 2017-02-11 RX ADMIN — METOCLOPRAMIDE 5 MG: 5 INJECTION, SOLUTION INTRAMUSCULAR; INTRAVENOUS at 11:29

## 2017-02-11 RX ADMIN — METRONIDAZOLE 500 MG: 500 INJECTION, SOLUTION INTRAVENOUS at 16:07

## 2017-02-11 RX ADMIN — DIGOXIN 250 MCG: 0.25 INJECTION INTRAMUSCULAR; INTRAVENOUS at 03:30

## 2017-02-11 RX ADMIN — CEFTRIAXONE 1 G: 1 INJECTION, POWDER, FOR SOLUTION INTRAMUSCULAR; INTRAVENOUS at 22:33

## 2017-02-11 RX ADMIN — SPIRONOLACTONE 25 MG: 25 TABLET ORAL at 16:54

## 2017-02-11 RX ADMIN — Medication 5 ML: at 06:00

## 2017-02-11 RX ADMIN — SPIRONOLACTONE 25 MG: 25 TABLET ORAL at 13:41

## 2017-02-11 RX ADMIN — METOCLOPRAMIDE 5 MG: 5 INJECTION, SOLUTION INTRAMUSCULAR; INTRAVENOUS at 22:51

## 2017-02-11 RX ADMIN — METOCLOPRAMIDE 5 MG: 5 INJECTION, SOLUTION INTRAMUSCULAR; INTRAVENOUS at 16:54

## 2017-02-11 RX ADMIN — PANTOPRAZOLE SODIUM 40 MG: 40 INJECTION, POWDER, FOR SOLUTION INTRAVENOUS at 22:33

## 2017-02-11 RX ADMIN — ENOXAPARIN SODIUM 30 MG: 30 INJECTION SUBCUTANEOUS at 05:56

## 2017-02-11 RX ADMIN — MAGNESIUM SULFATE IN WATER 2 G: 40 INJECTION, SOLUTION INTRAVENOUS at 10:23

## 2017-02-11 RX ADMIN — SUCRALFATE 1 G: 1 SUSPENSION ORAL at 11:29

## 2017-02-11 RX ADMIN — DIGOXIN 0.12 MG: 0.12 TABLET ORAL at 08:15

## 2017-02-11 RX ADMIN — CARBIDOPA AND LEVODOPA 5 MG: 25; 100 TABLET, EXTENDED RELEASE ORAL at 08:15

## 2017-02-11 RX ADMIN — METOCLOPRAMIDE 5 MG: 5 INJECTION, SOLUTION INTRAMUSCULAR; INTRAVENOUS at 05:57

## 2017-02-11 RX ADMIN — ONDANSETRON 4 MG: 2 INJECTION INTRAMUSCULAR; INTRAVENOUS at 12:31

## 2017-02-11 RX ADMIN — SUCRALFATE 1 G: 1 SUSPENSION ORAL at 05:57

## 2017-02-11 RX ADMIN — CARBIDOPA AND LEVODOPA 5 MG: 25; 100 TABLET, EXTENDED RELEASE ORAL at 16:54

## 2017-02-11 RX ADMIN — PANTOPRAZOLE SODIUM 40 MG: 40 INJECTION, POWDER, FOR SOLUTION INTRAVENOUS at 08:16

## 2017-02-11 RX ADMIN — SODIUM CHLORIDE: 900 INJECTION, SOLUTION INTRAVENOUS at 11:29

## 2017-02-11 RX ADMIN — SUCRALFATE 1 G: 1 SUSPENSION ORAL at 16:07

## 2017-02-11 RX ADMIN — FUROSEMIDE 40 MG: 10 INJECTION, SOLUTION INTRAMUSCULAR; INTRAVENOUS at 22:33

## 2017-02-11 RX ADMIN — Medication 5 ML: at 22:34

## 2017-02-11 RX ADMIN — FUROSEMIDE 20 MG: 10 INJECTION, SOLUTION INTRAMUSCULAR; INTRAVENOUS at 08:15

## 2017-02-11 RX ADMIN — METRONIDAZOLE 500 MG: 500 INJECTION, SOLUTION INTRAVENOUS at 00:04

## 2017-02-11 RX ADMIN — HYDROCORTISONE 2.5%: 25 CREAM TOPICAL at 06:06

## 2017-02-11 NOTE — PROGRESS NOTES
Monitor tech reports P180. On assessment, apical pulse counted at 104. Called monitor tech, who now reports HR of 97 in A-flutter. Will continue to monitor.

## 2017-02-11 NOTE — PROGRESS NOTES
No further complaints of nausea at this time. Pt having echo completed in room at this time. Kphos infusing to IV. Family at bedside, no needs at this time.

## 2017-02-11 NOTE — PROGRESS NOTES
Reviewed notes prior to visiting patient. Spoke with Dr. Cynthia Chau and his request to visit with patient. Jayda identified and contact information on file. Patient appears calm. [place] family at bedside. Receptive to  visit as patient and family engaged in conversation and interacted with interest.  Patient when asked did not identify any spiritual struggles or request.  Celebrated the victory of patient moving from critical unit to floor. Asked about patient's baseline of independence and quality of life. Family said they just want to get her better. Patient and family asked  to stop by during this admission for continued support. Patient benefited from addressing their spiritual needs and this may be a valuable part of her continued care as relationship is built with . Will continue to review and explore possible support during this admission.   Signed by Divine Blanchard MDIV, [facility]

## 2017-02-11 NOTE — PROGRESS NOTES
Seen per cardiology Dr. Prakash Hartley and Tena Valadez NP and states \"no need for transfer to telemetry at present\". Monitor tech confirms patient with a-fib HR 150s per monitor tech. Verbal bedside report given to oncoming nurse Danielle Pillai. Patient's situation, background, assessment and recommendations provided. Opportunity for questions provided. No s/s of pain noted. No distress noted. Oncoming RN assumed care of patient.

## 2017-02-11 NOTE — PROGRESS NOTES
Received bedside shift report from Rachael Cr RN. Pt lying in bed. No apparent distress. Respirations even and unlabored. Instructed to call for assistance with needs, as they arise. Pt voiced understanding.

## 2017-02-11 NOTE — PROGRESS NOTES
Pt states she is SOB. Currently on 35L 40%, O2 sat 92%. On remote tele. HR . Pt having diarrhea. AAOx4 at this time. Daughter in law at bedside. Call bell in reach.

## 2017-02-11 NOTE — PROGRESS NOTES
[facility] CARDIOLOGY PROGRESS NOTE      2/11/2017 10:41 AM    Admit Date: 2/5/2017    Admit Diagnosis: Nausea and vomiting, intractability of vomiting not specifi*      Subjective:   No chest pain or dyspnea. Objective:      Vitals:    02/11/17 0330 02/11/17 0450 02/11/17 0734 02/11/17 0736   BP: 104/68 136/74 110/64    Pulse: 96 87 (!) 105    Resp:  22 18    Temp:  97.9 °F (36.6 °C) 98.7 °F (37.1 °C)    SpO2:  98% 91%    Weight:    68 kg (150 lb)   Height:           Physical Exam:  Neck-   CV-i rr+r  Lungs- clear  Ext-edeam  Skin- warm and dry        Data Review:   Recent Labs      02/11/17   0605  02/10/17   1700  02/10/17   0600   NA  144  144  145   K  3.5  3.5  3.5   MG  1.8   --   2.1   BUN  14 17 17   CREA  0.93  1.06*  0.95   GLU  133*  125*  122*       Assessment and Plan:      Active Problems:    Pulmonary infiltrate (2/5/2017)      Bilateral leg edema (2/6/2017) elevate BMP  will advance diuretics       Esophagitis determined by endoscopy (2/7/2017)      Debility (2/9/2017)      Atrial fibrillation with RVR ([address] (2/10/2017)  echo today         Mercedez Scott MD

## 2017-02-11 NOTE — PROGRESS NOTES
Monitor tech called to report HR of 179. Apical pulse was irregular at 140 on assessment. Scheduled Cardizem given. Will continue to monitor.

## 2017-02-11 NOTE — PROGRESS NOTES
Incentive spirometer at bedside. Instructions and teaching provided to daughter in law, pt currently sleeping. Family verbalized understanding.

## 2017-02-11 NOTE — PROGRESS NOTES
Radha Folds  Admission Date: 2/5/2017             Daily Progress Note: 2/11/2017    The patient's chart is reviewed and the patient is discussed with the staff. 80yoF with a PMH of HTN, syncope, mild dementia who was recently hospitalized at [facility] after a fall and was discharged to rehab. Presents to ER from her rehab with hypotension and given IVFs but required Levophed. Had a recent UTI with E. Coli and treated with antibiotics. She has had persistent cough since December. Creatinine was elevated 2.12 and CXR is notable for possible retrocardiac infiltrate. GI consulted for N/V and EGD with severe erosive esophagitis, biopsy at EG junction, large hiatal hernia and mild gastroduodenitis. Flex sig with large hemorrhoids. Weaned off Levophed, diet advanced to clear liquids and moved to the floor. Placed on Opti-flow for shortness of breath. Overall doing poorly and family decided to make patient DNR on 2/10. Also started to have AFIB with RVR on 2/10. Seen by cardiology. Subjective:     Patient with AFIB issues since yesterday and still up slighlty in AM at 125. No pain. Still on airvo. Still weak, per her daughter-in-law who is at her bedside.     Current Facility-Administered Medications   Medication Dose Route Frequency    furosemide (LASIX) injection 20 mg  20 mg IntraVENous BID    hydrocortisone (ANUSOL-HC) 2.5 % rectal cream   PeriANAL QID PRN    digoxin (LANOXIN) tablet 0.125 mg  0.125 mg Oral DAILY    dilTIAZem (CARDIZEM) IR tablet 30 mg  30 mg Oral Q6H    NUTRITIONAL SUPPORT ELECTROLYTE PRN ORDERS   Does Not Apply PRN    acetaminophen (TYLENOL) tablet 500 mg  500 mg Oral Q4H PRN    busPIRone (BUSPAR) tablet 5 mg  5 mg Oral BID    ALPRAZolam (XANAX) tablet 0.25 mg  0.25 mg Oral Q8H PRN    morphine injection 2 mg  2 mg IntraVENous Q4H PRN    metoclopramide HCl (REGLAN) injection 5 mg  5 mg IntraVENous Q6H    sodium chloride (NS) flush 5 mL  5 mL InterCATHeter Q8H    sodium chloride (NS) flush 5-10 mL  5-10 mL InterCATHeter PRN    metroNIDAZOLE (FLAGYL) IVPB premix 500 mg  500 mg IntraVENous Q8H    enoxaparin (LOVENOX) injection 30 mg  30 mg SubCUTAneous Q24H    ondansetron (ZOFRAN) injection 4 mg  4 mg IntraVENous Q6H PRN    influenza vaccine - (36mos+)(PF) (FLUZONE/FLUARIX/FLULAVAL QUAD) injection 0.5 mL  0.5 mL IntraMUSCular PRIOR TO DISCHARGE    insulin regular (NOVOLIN R, HUMULIN R) injection   SubCUTAneous Q6H    cefTRIAXone (ROCEPHIN) 1 g in 0.9% sodium chloride (MBP/ADV) 50 mL  1 g IntraVENous Q24H    pantoprazole (PROTONIX) injection 40 mg  40 mg IntraVENous Q12H    sucralfate (CARAFATE) 100 mg/mL oral suspension 1 g  1 g Oral AC&HS    sodium chloride (NS) flush 5-10 mL  5-10 mL IntraVENous PRN       Review of Systems  Constitutional: negative for fever, chills, sweats  Cardiovascular: negative for chest pain, palpitations, syncope, edema  Gastrointestinal:  negative for dysphagia, reflux, vomiting, diarrhea, abdominal pain, or melena  Neurologic:  negative for focal weakness, numbness, headache    Objective:     Vitals:    17 0330 17 0450 17 0734 17 0736   BP: 104/68 136/74 110/64    Pulse: 96 87 (!) 105    Resp:  22 18    Temp:  97.9 °F (36.6 °C) 98.7 °F (37.1 °C)    SpO2:  98% 91%    Weight:    150 lb (68 kg)   Height:         Intake and Output:   1901 -  0700  In: 960 [P.O.:960]  Out: 1801 [Urine:1800]   07 -  190  In: 120 [P.O.:120]  Out: -     Physical Exam:   Constitution:  the patient is weak, elderly, dementia and in no acute distress Opti-flow 35 LPM, 39%,  EENMT:  Sclera clear, pupils equal, oral mucosa moist  Respiratory:  sounds in left base. No crackles today. Cardiovascular:  Irregular, AFib without M,G,R  Gastrointestinal: soft and non-tender; with positive bowel sounds, nausea at times. Musculoskeletal: warm without cyanosis.  There is bilateral lower leg and pedal edema.  Skin:  no jaundice or rashes, no wounds   Neurologic: no gross neuro deficits     Psychiatric:  alert and oriented x 1    CHEST XRAY: noted rotated with left base atelectasis/effusion. Right is more clear today             LAB  Recent Labs      02/11/17   0605  02/10/17   2350  02/10/17   1610  02/10/17   1255  02/10/17   0538   GLUCPOC  120*  149*  123*  240*  109*      No results for input(s): WBC, HGB, HCT, PLT, INR, HGBEXT, HCTEXT, PLTEXT, HGBEXT, HCTEXT, PLTEXT in the last 72 hours. No lab exists for component: INREXT, INREXT  Recent Labs      02/11/17   0605  02/10/17   1700  02/10/17   0600  02/09/17   2200  02/09/17   1135   NA  144  144  145  143  143   K  3.5  3.5  3.5  4.2  2.8*   CL  108*  108*  111*  109*  108*   CO2  26  27  26  25  24   GLU  133*  125*  122*  195*  171*   BUN  14  17  17  19  22   CREA  0.93  1.06*  0.95  1.15*  1.08*   MG  1.8   --   2.1  2.2  1.8   CA  7.8*  7.7*  7.4*  7.6*  8.1*   BNPP   --    --   1365   --   1349     No results for input(s): PH, PCO2, PO2, HCO3 in the last 72 hours. No results for input(s): LCAD, LAC in the last 72 hours. Assessment:  (Medical Decision Making)     Hospital Problems  Date Reviewed: 2/9/2017          Codes Class Noted POA    Debility (Chronic) ICD-10-CM: R53.81  ICD-9-CM: 799.3  2/9/2017 Yes    PT/OT following     Esophagitis determined by endoscopy ICD-10-CM: K20.9  ICD-9-CM: 530.10  2/7/2017 Yes    On Protonix q12h    Bilateral leg edema ICD-10-CM: R60.0  ICD-9-CM: 782.3  2/6/2017 Yes    May need diuresis--checking labs    Pulmonary infiltrate ICD-10-CM: R91.8  ICD-9-CM: 793.19  2/5/2017 Yes    Remains on antobiotics        Acute hypoxemic respiratory failure  --see below    AFIB with RVR  --see below    Plan:  (Medical Decision Making)     --HR is less in 120's today. . Continue dig and cardizem per cardiology, on remote telemetry they held off transfer  --continue lasix BID f/u I's and O's. Today is in negative balance.  CXR is less congestion. . Replace lytes  --add incentive spirometry  --continue abx -- Rocephin, Flagyl D7. Cultures are negative. Stop abx after today  --continue airvo still weak.  --PT/OT for mobility   --DNR  --continue remaining treatment. Prognosis is overall guarded to poor. More than 50% of the time documented was spent in face-to-face contact with the patient and in the care of the patient on the floor/unit where the patient is located. Keven Cr MD      This note was signed electronically.

## 2017-02-11 NOTE — PROGRESS NOTES
Pt ST on remote tele, . Resting quietly in bed with family at bedside. IV ABX infusing. No further complaints of nausea or pain. Pt has had frequent small bowel movements this shift. Respirations even and unlabored on airvo, 35L 40%.  Bed low and locked

## 2017-02-11 NOTE — PROGRESS NOTES
[facility] cardiology, Dr. Telly Rodriguez. Informed him of pt with uncontrolled A-flutter with a rate at 156, just given scheduled Cardizem and Lasix. Telephone order to go ahead and give the digoxin now, which is scheduled for 2200, and to continue with giving the Cardizem every 6 hrs through the night. He stated he does not want to transfer her to the telemetry unit [facility], that they will readdress that in the morning. Will continue to monitor.

## 2017-02-12 PROBLEM — I27.20 PULMONARY HYPERTENSION (HCC): Status: ACTIVE | Noted: 2017-02-12

## 2017-02-12 LAB
ANION GAP BLD CALC-SCNC: 8 MMOL/L (ref 7–16)
ANION GAP BLD CALC-SCNC: 8 MMOL/L (ref 7–16)
BUN SERPL-MCNC: 13 MG/DL (ref 8–23)
BUN SERPL-MCNC: 15 MG/DL (ref 8–23)
CALCIUM SERPL-MCNC: 7.6 MG/DL (ref 8.3–10.4)
CALCIUM SERPL-MCNC: 7.7 MG/DL (ref 8.3–10.4)
CHLORIDE SERPL-SCNC: 105 MMOL/L (ref 98–107)
CHLORIDE SERPL-SCNC: 106 MMOL/L (ref 98–107)
CO2 SERPL-SCNC: 30 MMOL/L (ref 21–32)
CO2 SERPL-SCNC: 33 MMOL/L (ref 21–32)
CREAT SERPL-MCNC: 0.97 MG/DL (ref 0.6–1)
CREAT SERPL-MCNC: 1.02 MG/DL (ref 0.6–1)
GLUCOSE BLD STRIP.AUTO-MCNC: 137 MG/DL (ref 65–100)
GLUCOSE BLD STRIP.AUTO-MCNC: 137 MG/DL (ref 65–100)
GLUCOSE BLD STRIP.AUTO-MCNC: 154 MG/DL (ref 65–100)
GLUCOSE BLD STRIP.AUTO-MCNC: 157 MG/DL (ref 65–100)
GLUCOSE SERPL-MCNC: 143 MG/DL (ref 65–100)
GLUCOSE SERPL-MCNC: 166 MG/DL (ref 65–100)
MAGNESIUM SERPL-MCNC: 1.9 MG/DL (ref 1.8–2.4)
POTASSIUM SERPL-SCNC: 3.3 MMOL/L (ref 3.5–5.1)
POTASSIUM SERPL-SCNC: 3.3 MMOL/L (ref 3.5–5.1)
SODIUM SERPL-SCNC: 144 MMOL/L (ref 136–145)
SODIUM SERPL-SCNC: 146 MMOL/L (ref 136–145)

## 2017-02-12 PROCEDURE — 74011250636 HC RX REV CODE- 250/636: Performed by: INTERNAL MEDICINE

## 2017-02-12 PROCEDURE — 74011250637 HC RX REV CODE- 250/637: Performed by: NURSE PRACTITIONER

## 2017-02-12 PROCEDURE — 82962 GLUCOSE BLOOD TEST: CPT

## 2017-02-12 PROCEDURE — 74011250637 HC RX REV CODE- 250/637: Performed by: INTERNAL MEDICINE

## 2017-02-12 PROCEDURE — 74011000258 HC RX REV CODE- 258: Performed by: INTERNAL MEDICINE

## 2017-02-12 PROCEDURE — 74011250636 HC RX REV CODE- 250/636: Performed by: NURSE PRACTITIONER

## 2017-02-12 PROCEDURE — 80048 BASIC METABOLIC PNL TOTAL CA: CPT | Performed by: INTERNAL MEDICINE

## 2017-02-12 PROCEDURE — C9113 INJ PANTOPRAZOLE SODIUM, VIA: HCPCS | Performed by: NURSE PRACTITIONER

## 2017-02-12 PROCEDURE — 74011000250 HC RX REV CODE- 250: Performed by: INTERNAL MEDICINE

## 2017-02-12 PROCEDURE — 99232 SBSQ HOSP IP/OBS MODERATE 35: CPT | Performed by: INTERNAL MEDICINE

## 2017-02-12 PROCEDURE — 94760 N-INVAS EAR/PLS OXIMETRY 1: CPT

## 2017-02-12 PROCEDURE — 77010033711 HC HIGH FLOW OXYGEN

## 2017-02-12 PROCEDURE — 65660000000 HC RM CCU STEPDOWN

## 2017-02-12 PROCEDURE — 74011636637 HC RX REV CODE- 636/637: Performed by: INTERNAL MEDICINE

## 2017-02-12 PROCEDURE — 83735 ASSAY OF MAGNESIUM: CPT | Performed by: INTERNAL MEDICINE

## 2017-02-12 PROCEDURE — 65270000029 HC RM PRIVATE

## 2017-02-12 RX ORDER — MAGNESIUM SULFATE HEPTAHYDRATE 40 MG/ML
2 INJECTION, SOLUTION INTRAVENOUS ONCE
Status: COMPLETED | OUTPATIENT
Start: 2017-02-12 | End: 2017-02-12

## 2017-02-12 RX ORDER — POTASSIUM CHLORIDE 20 MEQ/1
40 TABLET, EXTENDED RELEASE ORAL EVERY 4 HOURS
Status: COMPLETED | OUTPATIENT
Start: 2017-02-12 | End: 2017-02-12

## 2017-02-12 RX ORDER — FUROSEMIDE 40 MG/1
80 TABLET ORAL DAILY
Status: DISCONTINUED | OUTPATIENT
Start: 2017-02-13 | End: 2017-02-17 | Stop reason: HOSPADM

## 2017-02-12 RX ADMIN — SUCRALFATE 1 G: 1 SUSPENSION ORAL at 10:55

## 2017-02-12 RX ADMIN — DIGOXIN 0.12 MG: 0.12 TABLET ORAL at 08:39

## 2017-02-12 RX ADMIN — METRONIDAZOLE 500 MG: 500 INJECTION, SOLUTION INTRAVENOUS at 08:38

## 2017-02-12 RX ADMIN — PANTOPRAZOLE SODIUM 40 MG: 40 INJECTION, POWDER, FOR SOLUTION INTRAVENOUS at 22:17

## 2017-02-12 RX ADMIN — POTASSIUM CHLORIDE 40 MEQ: 20 TABLET, EXTENDED RELEASE ORAL at 10:55

## 2017-02-12 RX ADMIN — DILTIAZEM HYDROCHLORIDE 30 MG: 30 TABLET, FILM COATED ORAL at 08:39

## 2017-02-12 RX ADMIN — METOCLOPRAMIDE 5 MG: 5 INJECTION, SOLUTION INTRAMUSCULAR; INTRAVENOUS at 17:03

## 2017-02-12 RX ADMIN — DILTIAZEM HYDROCHLORIDE 30 MG: 30 TABLET, FILM COATED ORAL at 22:18

## 2017-02-12 RX ADMIN — Medication 5 ML: at 06:00

## 2017-02-12 RX ADMIN — Medication 5 ML: at 13:55

## 2017-02-12 RX ADMIN — MAGNESIUM SULFATE HEPTAHYDRATE 2 G: 40 INJECTION, SOLUTION INTRAVENOUS at 11:31

## 2017-02-12 RX ADMIN — METOCLOPRAMIDE 5 MG: 5 INJECTION, SOLUTION INTRAMUSCULAR; INTRAVENOUS at 11:31

## 2017-02-12 RX ADMIN — POTASSIUM CHLORIDE 40 MEQ: 20 TABLET, EXTENDED RELEASE ORAL at 13:43

## 2017-02-12 RX ADMIN — ENOXAPARIN SODIUM 40 MG: 40 INJECTION SUBCUTANEOUS at 06:30

## 2017-02-12 RX ADMIN — DILTIAZEM HYDROCHLORIDE 30 MG: 30 TABLET, FILM COATED ORAL at 13:35

## 2017-02-12 RX ADMIN — CARBIDOPA AND LEVODOPA 5 MG: 25; 100 TABLET, EXTENDED RELEASE ORAL at 08:42

## 2017-02-12 RX ADMIN — ONDANSETRON 4 MG: 2 INJECTION INTRAMUSCULAR; INTRAVENOUS at 01:11

## 2017-02-12 RX ADMIN — ONDANSETRON 4 MG: 2 INJECTION INTRAMUSCULAR; INTRAVENOUS at 17:42

## 2017-02-12 RX ADMIN — HUMAN INSULIN 2 UNITS: 100 INJECTION, SOLUTION SUBCUTANEOUS at 12:00

## 2017-02-12 RX ADMIN — SUCRALFATE 1 G: 1 SUSPENSION ORAL at 22:18

## 2017-02-12 RX ADMIN — CEFTRIAXONE 1 G: 1 INJECTION, POWDER, FOR SOLUTION INTRAMUSCULAR; INTRAVENOUS at 22:11

## 2017-02-12 RX ADMIN — SODIUM CHLORIDE 12.5 MG: 9 INJECTION INTRAMUSCULAR; INTRAVENOUS; SUBCUTANEOUS at 22:19

## 2017-02-12 RX ADMIN — METOCLOPRAMIDE 5 MG: 5 INJECTION, SOLUTION INTRAMUSCULAR; INTRAVENOUS at 06:31

## 2017-02-12 RX ADMIN — SUCRALFATE 1 G: 1 SUSPENSION ORAL at 17:03

## 2017-02-12 RX ADMIN — METRONIDAZOLE 500 MG: 500 INJECTION, SOLUTION INTRAVENOUS at 16:23

## 2017-02-12 RX ADMIN — SODIUM CHLORIDE 12.5 MG: 9 INJECTION INTRAMUSCULAR; INTRAVENOUS; SUBCUTANEOUS at 16:23

## 2017-02-12 RX ADMIN — SPIRONOLACTONE 25 MG: 25 TABLET ORAL at 08:39

## 2017-02-12 RX ADMIN — Medication 5 ML: at 22:00

## 2017-02-12 RX ADMIN — HUMAN INSULIN 2 UNITS: 100 INJECTION, SOLUTION SUBCUTANEOUS at 18:00

## 2017-02-12 RX ADMIN — METRONIDAZOLE 500 MG: 500 INJECTION, SOLUTION INTRAVENOUS at 01:08

## 2017-02-12 RX ADMIN — FUROSEMIDE 40 MG: 10 INJECTION, SOLUTION INTRAMUSCULAR; INTRAVENOUS at 08:39

## 2017-02-12 RX ADMIN — PANTOPRAZOLE SODIUM 40 MG: 40 INJECTION, POWDER, FOR SOLUTION INTRAVENOUS at 08:39

## 2017-02-12 RX ADMIN — CARBIDOPA AND LEVODOPA 5 MG: 25; 100 TABLET, EXTENDED RELEASE ORAL at 17:05

## 2017-02-12 NOTE — PROGRESS NOTES
Pt given Zofran 4mg IVP, pt reports no relief from nausea, dry heaving noted. Pt refusing PO medications d/t nausea, Dr. Carter Morales notified, received orders for Phenergan 12.5mg IVP Q6H PRN. See MAR.

## 2017-02-12 NOTE — PROGRESS NOTES
Patient reported feeling very bad and nauseated and declined PT today. Family in room with patient.   Diana Hannah, PTA

## 2017-02-12 NOTE — PROGRESS NOTES
Kyle Rodriguez  Admission Date: 2/5/2017             Daily Progress Note: 2/12/2017    The patient's chart is reviewed and the patient is discussed with the staff. 80yoF with a PMH of HTN, syncope, mild dementia who was recently hospitalized at [facility] after a fall and was discharged to rehab. Presents to ER from her rehab with hypotension and given IVFs but required Levophed. Had a recent UTI with E. Coli and treated with antibiotics. She has had persistent cough since December. Creatinine was elevated 2.12 and CXR is notable for possible retrocardiac infiltrate. GI consulted for N/V and EGD with severe erosive esophagitis, biopsy at EG junction, large hiatal hernia and mild gastroduodenitis. Flex sig with large hemorrhoids. Weaned off Levophed, diet advanced to clear liquids and moved to the floor. Placed on Opti-flow for shortness of breath. Overall doing poorly and family decided to make patient DNR on 2/10. Also started to have AFIB with RVR on 2/10. Seen by cardiology. Subjective:     Patient in bed on optiflow at 40% and 35 LPM. Feels less dyspnea. No cough. No wheezing. Wants to sit in chair. Feels that her bladder is full.  Making a lot of urine    Current Facility-Administered Medications   Medication Dose Route Frequency    promethazine (PHENERGAN) with saline injection 12.5 mg  12.5 mg IntraVENous Q6H PRN    furosemide (LASIX) injection 40 mg  40 mg IntraVENous BID    spironolactone (ALDACTONE) tablet 25 mg  25 mg Oral BID    enoxaparin (LOVENOX) injection 40 mg  40 mg SubCUTAneous Q24H    hydrocortisone (ANUSOL-HC) 2.5 % rectal cream   PeriANAL QID PRN    digoxin (LANOXIN) tablet 0.125 mg  0.125 mg Oral DAILY    dilTIAZem (CARDIZEM) IR tablet 30 mg  30 mg Oral Q6H    NUTRITIONAL SUPPORT ELECTROLYTE PRN ORDERS   Does Not Apply PRN    acetaminophen (TYLENOL) tablet 500 mg  500 mg Oral Q4H PRN    busPIRone (BUSPAR) tablet 5 mg  5 mg Oral BID    ALPRAZolam [facility]) tablet 0.25 mg  0.25 mg Oral Q8H PRN    morphine injection 2 mg  2 mg IntraVENous Q4H PRN    metoclopramide HCl (REGLAN) injection 5 mg  5 mg IntraVENous Q6H    sodium chloride (NS) flush 5 mL  5 mL InterCATHeter Q8H    sodium chloride (NS) flush 5-10 mL  5-10 mL InterCATHeter PRN    metroNIDAZOLE (FLAGYL) IVPB premix 500 mg  500 mg IntraVENous Q8H    ondansetron (ZOFRAN) injection 4 mg  4 mg IntraVENous Q6H PRN    influenza vaccine - (36mos+)(PF) (FLUZONE/FLUARIX/FLULAVAL QUAD) injection 0.5 mL  0.5 mL IntraMUSCular PRIOR TO DISCHARGE    insulin regular (NOVOLIN R, HUMULIN R) injection   SubCUTAneous Q6H    cefTRIAXone (ROCEPHIN) 1 g in 0.9% sodium chloride (MBP/ADV) 50 mL  1 g IntraVENous Q24H    pantoprazole (PROTONIX) injection 40 mg  40 mg IntraVENous Q12H    sucralfate (CARAFATE) 100 mg/mL oral suspension 1 g  1 g Oral AC&HS    sodium chloride (NS) flush 5-10 mL  5-10 mL IntraVENous PRN       Review of Systems  Constitutional: negative for fever, chills, sweats  Cardiovascular: negative for chest pain, palpitations, syncope, edema  Gastrointestinal:  negative for dysphagia, reflux, vomiting, diarrhea, abdominal pain, or melena  Neurologic:  negative for focal weakness, numbness, headache    Objective:     Vitals:    17 0001 17 0255 17 0721 17 0800   BP: 140/80 120/82 107/63    Pulse: 94 (!) 108 91    Resp: 19 20 16    Temp: 97.9 °F (36.6 °C) 97.6 °F (36.4 °C) 97.3 °F (36.3 °C)    SpO2: 94% 94% 93%    Weight:    114 lb 9.6 oz (52 kg)   Height:         Intake and Output:   02/10 1901 -  0700  In: 390 [P.O.:390]  Out: 2850 [Urine:2850]       Physical Exam:   Constitution:  the patient is weak, elderly, dementia and in no acute distress Opti-flow 35 LPM, 40%,  EENMT:  Sclera clear, pupils equal, oral mucosa moist  Respiratory:  sounds in bases. . No crackles today. No wheezing. Does have mild tachypnea.    Cardiovascular:  Irregular, AFib without M,G,R  Gastrointestinal: soft and non-tender; with positive bowel sounds, nausea at times. Musculoskeletal: warm without cyanosis. There is tace b/l lower leg and pedal edema. Skin:  no jaundice or rashes, no wounds   Neurologic: no gross neuro deficits     Psychiatric:  alert and oriented x 1    CHEST XRAY: noted rotated with left base atelectasis/effusion. Right is more clear today             LAB  Recent Labs      02/12/17   0523  02/12/17   0120  02/11/17   2048  02/11/17   1552  02/11/17   1137   GLUCPOC  137*  137*  178*  197*  147*      No results for input(s): WBC, HGB, HCT, PLT, INR, HGBEXT, HCTEXT, PLTEXT, HGBEXT, HCTEXT, PLTEXT in the last 72 hours. No lab exists for component: INREXT, INREXT  Recent Labs      02/12/17   0625  02/11/17   0605  02/10/17   1700  02/10/17   0600   02/09/17   1135   NA  144  144  144  145   < >  143   K  3.3*  3.5  3.5  3.5   < >  2.8*   CL  106  108*  108*  111*   < >  108*   CO2  30  26  27  26   < >  24   GLU  143*  133*  125*  122*   < >  171*   BUN  13  14  17  17   < >  22   CREA  0.97  0.93  1.06*  0.95   < >  1.08*   MG  1.9  1.8   --   2.1   < >  1.8   CA  7.6*  7.8*  7.7*  7.4*   < >  8.1*   PHOS   --   1.1*   --    --    --    --    BNPP   --    --    --   1365   --   1349    < > = values in this interval not displayed. No results for input(s): PH, PCO2, PO2, HCO3 in the last 72 hours. No results for input(s): LCAD, LAC in the last 72 hours.       Assessment:  (Medical Decision Making)     Hospital Problems  Date Reviewed: 2/9/2017          Codes Class Noted POA    Debility (Chronic) ICD-10-CM: R53.81  ICD-9-CM: 799.3  2/9/2017 Yes    PT/OT following     Esophagitis determined by endoscopy ICD-10-CM: K20.9  ICD-9-CM: 530.10  2/7/2017 Yes    On Protonix q12h    Bilateral leg edema ICD-10-CM: R60.0  ICD-9-CM: 782.3  2/6/2017 Yes    May need diuresis--checking labs    Pulmonary infiltrate ICD-10-CM: R91.8  ICD-9-CM: 793.19  2/5/2017 Yes    Remains on antobiotics        Acute hypoxemic respiratory failure  --see below    AFIB with RVR  --rate more controlled now    Plan:  (Medical Decision Making)     --make excellent diureses. Will replace lytes. Will change to PO tomorrow, pending condition. --tray on HFNC today vs optiflow. --cardiology for AFIB and now in 90's   --add incentive spirometry  --stopped abx -- s/o  Rocephin, Flagyl D7. Cultures are negative.   --PT/OT for mobility  --DNR  --continue remaining treatment. Prognosis is overall guarded to poor. More than 50% of the time documented was spent in face-to-face contact with the patient and in the care of the patient on the floor/unit where the patient is located. Kiara Johnson MD      This note was signed electronically.

## 2017-02-12 NOTE — PROGRESS NOTES
Pt resting quietly in bed, HOB elevated. PT A&Ox4 on Opti-flow. Lung sounds diminished, S1/S2 audible, peripheral pulses palpable, extremities warm. Pt NSR @ 93 per tele monitor. . Pt has EJ to right neck. Pt has white draining clear yellow urine to bag at bedside. Pt denies pain or SOB at this time. Call light in place, pt instructed to call for assistance as needed.

## 2017-02-12 NOTE — PROGRESS NOTES
BSR received from CAROLEE Garner shift assessment completed pt alert and orient in bed resting denies any pain or discomfort instructed pt to call for assistance no distress noted will continue to monitor

## 2017-02-12 NOTE — PROGRESS NOTES
[facility] CARDIOLOGY PROGRESS NOTE      2/12/2017 12:38 PM    Admit Date: 2/5/2017    Admit Diagnosis: Nausea and vomiting, intractability of vomiting not specifi*      Subjective:   No chest pain and mild dyspnea on O2  . She is weak       Objective:      Vitals:    02/12/17 0721 02/12/17 0800 02/12/17 1208 02/12/17 1214   BP: 107/63   110/73   Pulse: 91   (!) 110   Resp: 16   20   Temp: 97.3 °F (36.3 °C)   98.4 °F (36.9 °C)   SpO2: 93%  95% 92%   Weight:  52 kg (114 lb 9.6 oz)     Height:           Physical Exam:  Neck-   CV- rr+r  S4   1/6 KAYLEIGH   Lungs- clearer  Ext-  Skin- warm and dry        Data Review:   Recent Labs      02/12/17   0625  02/11/17   0605   NA  144  144   K  3.3*  3.5   MG  1.9  1.8   BUN  13  14   CREA  0.97  0.93   GLU  143*  133*       Assessment and Plan: Active Problems:    Pulmonary infiltrate (2/5/2017)      Bilateral leg edema (2/6/2017)      Esophagitis determined by endoscopy (2/7/2017)      Debility (2/9/2017)      Atrial fibrillation with RVR ([address] (2/10/2017)      Pulmonary hypertension ([address] (2/12/2017)      Overview:  Left ventricle: Systolic function was hyperdynamic. Ejection fraction was      estimated in the range of 70 % to 75 %. Septal flattening consistent with       RV      pressure and volume overload There were no regional wall motion       abnormalities. PAP est 65mmhg by TR             -  Right ventricle: The ventricle was markedly dilated. Systolic function       was      reduced. she will be very preload sensitive regarding her forward cardiac output. .  be cautious with diuretics.  Pulmonary htn appears advanced and chronic by echo    She does not appear to be a candidate for RHC and phosphodiesterase inhibition      Nicki Hayden MD

## 2017-02-13 LAB
ANION GAP BLD CALC-SCNC: 7 MMOL/L (ref 7–16)
BUN SERPL-MCNC: 17 MG/DL (ref 8–23)
CALCIUM SERPL-MCNC: 7.7 MG/DL (ref 8.3–10.4)
CHLORIDE SERPL-SCNC: 105 MMOL/L (ref 98–107)
CO2 SERPL-SCNC: 33 MMOL/L (ref 21–32)
CREAT SERPL-MCNC: 0.95 MG/DL (ref 0.6–1)
GLUCOSE BLD STRIP.AUTO-MCNC: 120 MG/DL (ref 65–100)
GLUCOSE BLD STRIP.AUTO-MCNC: 141 MG/DL (ref 65–100)
GLUCOSE BLD STRIP.AUTO-MCNC: 152 MG/DL (ref 65–100)
GLUCOSE BLD STRIP.AUTO-MCNC: 193 MG/DL (ref 65–100)
GLUCOSE SERPL-MCNC: 121 MG/DL (ref 65–100)
MAGNESIUM SERPL-MCNC: 2.3 MG/DL (ref 1.8–2.4)
POTASSIUM SERPL-SCNC: 3 MMOL/L (ref 3.5–5.1)
SODIUM SERPL-SCNC: 145 MMOL/L (ref 136–145)

## 2017-02-13 PROCEDURE — 97530 THERAPEUTIC ACTIVITIES: CPT

## 2017-02-13 PROCEDURE — 99232 SBSQ HOSP IP/OBS MODERATE 35: CPT | Performed by: INTERNAL MEDICINE

## 2017-02-13 PROCEDURE — 74011250637 HC RX REV CODE- 250/637: Performed by: NURSE PRACTITIONER

## 2017-02-13 PROCEDURE — 74011636637 HC RX REV CODE- 636/637: Performed by: INTERNAL MEDICINE

## 2017-02-13 PROCEDURE — 74011250636 HC RX REV CODE- 250/636: Performed by: INTERNAL MEDICINE

## 2017-02-13 PROCEDURE — C9113 INJ PANTOPRAZOLE SODIUM, VIA: HCPCS | Performed by: NURSE PRACTITIONER

## 2017-02-13 PROCEDURE — 83735 ASSAY OF MAGNESIUM: CPT | Performed by: INTERNAL MEDICINE

## 2017-02-13 PROCEDURE — 97110 THERAPEUTIC EXERCISES: CPT

## 2017-02-13 PROCEDURE — 74011000250 HC RX REV CODE- 250: Performed by: INTERNAL MEDICINE

## 2017-02-13 PROCEDURE — 74011250637 HC RX REV CODE- 250/637: Performed by: INTERNAL MEDICINE

## 2017-02-13 PROCEDURE — 74011250636 HC RX REV CODE- 250/636: Performed by: NURSE PRACTITIONER

## 2017-02-13 PROCEDURE — 82962 GLUCOSE BLOOD TEST: CPT

## 2017-02-13 PROCEDURE — 80048 BASIC METABOLIC PNL TOTAL CA: CPT | Performed by: INTERNAL MEDICINE

## 2017-02-13 PROCEDURE — 74011000258 HC RX REV CODE- 258: Performed by: INTERNAL MEDICINE

## 2017-02-13 PROCEDURE — 65270000029 HC RM PRIVATE

## 2017-02-13 PROCEDURE — 76450000000

## 2017-02-13 RX ADMIN — METOCLOPRAMIDE 5 MG: 5 INJECTION, SOLUTION INTRAMUSCULAR; INTRAVENOUS at 00:46

## 2017-02-13 RX ADMIN — SPIRONOLACTONE 25 MG: 25 TABLET ORAL at 17:02

## 2017-02-13 RX ADMIN — SPIRONOLACTONE 25 MG: 25 TABLET ORAL at 08:17

## 2017-02-13 RX ADMIN — WATER 2 MG: 1 INJECTION INTRAMUSCULAR; INTRAVENOUS; SUBCUTANEOUS at 18:03

## 2017-02-13 RX ADMIN — HUMAN INSULIN 2 UNITS: 100 INJECTION, SOLUTION SUBCUTANEOUS at 00:48

## 2017-02-13 RX ADMIN — SUCRALFATE 1 G: 1 SUSPENSION ORAL at 11:08

## 2017-02-13 RX ADMIN — WATER 2 MG: 1 INJECTION INTRAMUSCULAR; INTRAVENOUS; SUBCUTANEOUS at 18:04

## 2017-02-13 RX ADMIN — SUCRALFATE 1 G: 1 SUSPENSION ORAL at 06:49

## 2017-02-13 RX ADMIN — Medication 5 ML: at 14:52

## 2017-02-13 RX ADMIN — DILTIAZEM HYDROCHLORIDE 30 MG: 30 TABLET, FILM COATED ORAL at 02:00

## 2017-02-13 RX ADMIN — Medication 5 ML: at 06:00

## 2017-02-13 RX ADMIN — HUMAN INSULIN 2 UNITS: 100 INJECTION, SOLUTION SUBCUTANEOUS at 11:50

## 2017-02-13 RX ADMIN — METRONIDAZOLE 500 MG: 500 INJECTION, SOLUTION INTRAVENOUS at 08:18

## 2017-02-13 RX ADMIN — MORPHINE SULFATE 2 MG: 2 INJECTION, SOLUTION INTRAMUSCULAR; INTRAVENOUS at 16:59

## 2017-02-13 RX ADMIN — ALTEPLASE 2 MG: 2.2 INJECTION, POWDER, LYOPHILIZED, FOR SOLUTION INTRAVENOUS at 18:03

## 2017-02-13 RX ADMIN — DILTIAZEM HYDROCHLORIDE 30 MG: 30 TABLET, FILM COATED ORAL at 23:08

## 2017-02-13 RX ADMIN — ENOXAPARIN SODIUM 40 MG: 40 INJECTION SUBCUTANEOUS at 06:49

## 2017-02-13 RX ADMIN — CARBIDOPA AND LEVODOPA 5 MG: 25; 100 TABLET, EXTENDED RELEASE ORAL at 17:02

## 2017-02-13 RX ADMIN — DILTIAZEM HYDROCHLORIDE 30 MG: 30 TABLET, FILM COATED ORAL at 08:17

## 2017-02-13 RX ADMIN — METOCLOPRAMIDE 5 MG: 5 INJECTION, SOLUTION INTRAMUSCULAR; INTRAVENOUS at 23:09

## 2017-02-13 RX ADMIN — DILTIAZEM HYDROCHLORIDE 30 MG: 30 TABLET, FILM COATED ORAL at 14:51

## 2017-02-13 RX ADMIN — SODIUM CHLORIDE 12.5 MG: 9 INJECTION INTRAMUSCULAR; INTRAVENOUS; SUBCUTANEOUS at 08:16

## 2017-02-13 RX ADMIN — METOCLOPRAMIDE 5 MG: 5 INJECTION, SOLUTION INTRAMUSCULAR; INTRAVENOUS at 16:49

## 2017-02-13 RX ADMIN — DIGOXIN 0.12 MG: 0.12 TABLET ORAL at 08:17

## 2017-02-13 RX ADMIN — METOCLOPRAMIDE 5 MG: 5 INJECTION, SOLUTION INTRAMUSCULAR; INTRAVENOUS at 11:09

## 2017-02-13 RX ADMIN — SUCRALFATE 1 G: 1 SUSPENSION ORAL at 23:10

## 2017-02-13 RX ADMIN — SODIUM CHLORIDE 12.5 MG: 9 INJECTION INTRAMUSCULAR; INTRAVENOUS; SUBCUTANEOUS at 14:51

## 2017-02-13 RX ADMIN — CARBIDOPA AND LEVODOPA 5 MG: 25; 100 TABLET, EXTENDED RELEASE ORAL at 08:22

## 2017-02-13 RX ADMIN — CEFTRIAXONE 1 G: 1 INJECTION, POWDER, FOR SOLUTION INTRAMUSCULAR; INTRAVENOUS at 23:06

## 2017-02-13 RX ADMIN — Medication 5 ML: at 22:00

## 2017-02-13 RX ADMIN — FUROSEMIDE 80 MG: 40 TABLET ORAL at 08:17

## 2017-02-13 RX ADMIN — METRONIDAZOLE 500 MG: 500 INJECTION, SOLUTION INTRAVENOUS at 00:45

## 2017-02-13 RX ADMIN — METRONIDAZOLE 500 MG: 500 INJECTION, SOLUTION INTRAVENOUS at 16:44

## 2017-02-13 RX ADMIN — METOCLOPRAMIDE 5 MG: 5 INJECTION, SOLUTION INTRAMUSCULAR; INTRAVENOUS at 06:48

## 2017-02-13 RX ADMIN — ALPRAZOLAM 0.25 MG: 0.5 TABLET ORAL at 11:07

## 2017-02-13 RX ADMIN — PANTOPRAZOLE SODIUM 40 MG: 40 INJECTION, POWDER, FOR SOLUTION INTRAVENOUS at 23:08

## 2017-02-13 RX ADMIN — PANTOPRAZOLE SODIUM 40 MG: 40 INJECTION, POWDER, FOR SOLUTION INTRAVENOUS at 08:16

## 2017-02-13 RX ADMIN — SUCRALFATE 1 G: 1 SUSPENSION ORAL at 14:52

## 2017-02-13 NOTE — PROGRESS NOTES
No further complaints of pain. Cathflo administered to all 3 lumens. Currently sitting in lumens for next 30 min. Family at bedside at this time. No needs.

## 2017-02-13 NOTE — CONSULTS
Palliative Care    Patient: Sal Hernandez MRN: [id number]  SSN: [id number]    YOB: 1934  Age: 80 y.o. Sex: female       Date of Request: 2/13/2017  Date of Consult:  2/13/2017  Reason for Consult:  goals of care  Requesting Physician: Dr Michael Carter     Assessment/Plan:     Principal Diagnosis:    Debility, Unspecified  R53.81  Additional Diagnoses:   Dementia  F03.90   Fatigue, Lethargy  R53.83  Frailty  R54  Nausea/Vomiting  R11.2  Counseling, Encounter for Medical Advice  Z71.9  Encounter for Palliative Care  Z51.5    Palliative Performance Scale (PPS):  PPS: 40    Medical Decision Making:   Reviewed and summarized notes from admission to present   Discussed case with appropriate providers  Reviewed laboratory and x-ray data from admission to present     Patient resting in bed, sister and brother-in-law are at the bedside. Patient with mild dementia. Discussion held about patient's current condition and the role of palliative care. Provided active listening and emotional support as family shares about patient's decline over the past weeks to months. Counseled them on patient's pulmonary hypertension. Counseled that plan of care will be based on overall goals of care. Patient lives with her son and daughter-in-law. Family meeting planned for 2/14 at 4pm to discuss. Currently, patient denies any pain or discomfort, resting in bed comfortably. She does have morphine available prn. Will continue to follow. Will discuss findings with members of the interdisciplinary team.      Thank you for this referral.   The Palliative Care team is available from 8 am to 4:30 pm Monday-Friday. Medical management during other hours is per the primary attending service.        .    Subjective:     History obtained from:  Patient, Family and Chart    Chief Complaint: Sepsis, nausea   History of Present Illness:  Ms. Verito Barrow is an 81 yo white female with PMH of HTN, GERD, mild dementia, debility, and other history as listed below. Patient presented to [facility] ER from STR on 2/5 with hypotension and abdominal pain. Patient with several week history of nausea/vomiting. Workup revealed leukocytosis, elevated creatinine, elevated BNP. She initially required vasopressor support and was admitted to ICU for further management of sepsis with possible intra-abdominal source. GI workup after admission included EGD that revealed severe esophagitis and large hiatal hernia. Patient developed a fib with RVR on 2/10 and patient had cardiology consult. Patient had echo on 2/11 that revealed right sided heart failure and pulmonary hypertension. Advance Directive: No       Code Status:  DNR            Health Care Power of : Unknown    Past Medical History   Diagnosis Date    Arrhythmia      by history/ not at present    CAD (coronary artery disease)      mitral valve prolapse    Chronic pain     Gastrointestinal disorder      GERD    Gastrointestinal disorder      gallstones    GERD (gastroesophageal reflux disease)       Past Surgical History   Procedure Laterality Date    Hx hysterectomy      Pr breast surgery procedure unlisted       lumpectomy on RT breast    Flexible sigmoidoscopy N/A 2/7/2017     SIGMOIDOSCOPY FLEXIBLE at bedside performed by Grayson Oliver MD at [facility] ENDOSCOPY     Family History   Problem Relation Age of Onset    Diabetes Mother     Hypertension Mother     Breast Cancer Neg Hx       Social History   Substance Use Topics    Smoking status: Never Smoker    Smokeless tobacco: Not on file    Alcohol use No     Prior to Admission medications    Medication Sig Start Date End Date Taking? Authorizing Provider   metoprolol succinate (TOPROL-XL) 25 mg XL tablet Take 25 mg by mouth daily. Yes Historical Provider   pantoprazole (PROTONIX) 40 mg tablet Take 40 mg by mouth daily. Yes Historical Provider   furosemide (LASIX) 20 mg tablet Take 20 mg by mouth daily.    Yes Historical Provider   potassium chloride SR (KLOR-CON 10) 10 mEq tablet Take 10 mEq by mouth daily. Yes Historical Provider   sucralfate (CARAFATE) 100 mg/mL suspension Take 1 tsp by mouth four (4) times daily as needed. Yes Historical Provider   metoclopramide HCl (REGLAN) 5 mg tablet Take 1 Tab by mouth every six (6) hours as needed for Nausea. 1/30/17   Rosario Boone DO       Allergies   Allergen Reactions    Aspirin Nausea and Vomiting    Pcn [Penicillins] Itching        Review of Systems:  A comprehensive review of systems was negative except for:   Constitutional: Positive for fatigue. Objective:     Visit Vitals    /58 (BP 1 Location: Right arm, BP Patient Position: Head of bed elevated (Comment degrees))    Pulse 88    Temp 97.9 °F (36.6 °C)    Resp 18    Ht 5' 3\" (1.6 m)    Wt 61.9 kg (136 lb 6.4 oz)    SpO2 100%    Breastfeeding No    BMI 24.16 kg/m2        Physical Exam:    General:  Elderly, debilitated. Cooperative. No acute distress. Eyes:  Conjunctivae/corneas clear    Nose: Nares normal. Septum midline. O2 via high flow NC. Neck: Supple, symmetrical, trachea midline. Lungs:   Diminished to bases bilaterally, unlabored. Heart:  Regular rate and rhythm. Abdomen:   Soft, non-tender, non-distended. Extremities: Normal, atraumatic, no cyanosis. 1+ pedal edema. Skin: Skin color, texture, turgor normal. No rash. Neurologic: Nonfocal.   Psych: Alert and oriented to person and place. Assessment:     Hospital Problems  Date Reviewed: 2/9/2017          Codes Class Noted POA    Pulmonary hypertension ([facility] ICD-10-CM: I27.2  ICD-9-CM: 416.8  2/12/2017 Unknown    Overview Signed 2/12/2017 12:41 PM by Corrina Mustafa MD      Left ventricle: Systolic function was hyperdynamic. Ejection fraction was  estimated in the range of 70 % to 75 %. Septal flattening consistent with RV  pressure and volume overload There were no regional wall motion   abnormalities.      PAP est 65mmhg by TR -  Right ventricle: The ventricle was markedly dilated. Systolic function was  reduced.              Atrial fibrillation with RVR ([address] ICD-10-CM: I48.91  ICD-9-CM: 427.31  2/10/2017 Unknown        Debility (Chronic) ICD-10-CM: R53.81  ICD-9-CM: 799.3  2/9/2017 Yes        Esophagitis determined by endoscopy ICD-10-CM: K20.9  ICD-9-CM: 530.10  2/7/2017 Yes        Bilateral leg edema ICD-10-CM: R60.0  ICD-9-CM: 782.3  2/6/2017 Yes        Pulmonary infiltrate ICD-10-CM: R91.8  ICD-9-CM: 793.19  2/5/2017 Yes              Signed By: Víctor North NP     February 13, 2017

## 2017-02-13 NOTE — PROGRESS NOTES
Pt sleeping at this time. No visual signs of pain or discomfort. Family wishes patient to rest and not to disturb at this time. Will continue to monitor for pain and give Morphine IV as needed.

## 2017-02-13 NOTE — PROGRESS NOTES
Pt resting in bed, alert and oriented x3. Respirations even and unlabored on 12L HF nc. [place] catheter draining yellow urine, leg strap intact. BLE edema noted. Daughter in law at bedside.  Call bell in reach, bed low and locked

## 2017-02-13 NOTE — PROGRESS NOTES
Pt resting quietly in bed on 12L O2 via NC. No s/s of acute distress noted. Report given to oncoming RN.

## 2017-02-13 NOTE — PROGRESS NOTES
[facility] CARDIOLOGY PROGRESS NOTE           2/13/2017 10:28 AM    Admit Date: 2/5/2017      Subjective:   Patient states she feels awful. Daughter in room.     ROS:  Cardiovascular:  As noted above    Objective:      Vitals:    02/13/17 0027 02/13/17 0452 02/13/17 0500 02/13/17 0743   BP: 124/75 107/51  102/58   Pulse: 85 81  84   Resp: 20 20  20   Temp: 97.5 °F (36.4 °C) 97.3 °F (36.3 °C)  97.1 °F (36.2 °C)   SpO2: 99% 98%  97%   Weight:   61.9 kg (136 lb 6.4 oz)    Height:         Current Facility-Administered Medications   Medication Dose Route Frequency    promethazine (PHENERGAN) with saline injection 12.5 mg  12.5 mg IntraVENous Q6H PRN    furosemide (LASIX) tablet 80 mg  80 mg Oral DAILY    spironolactone (ALDACTONE) tablet 25 mg  25 mg Oral BID    enoxaparin (LOVENOX) injection 40 mg  40 mg SubCUTAneous Q24H    hydrocortisone (ANUSOL-HC) 2.5 % rectal cream   PeriANAL QID PRN    digoxin (LANOXIN) tablet 0.125 mg  0.125 mg Oral DAILY    dilTIAZem (CARDIZEM) IR tablet 30 mg  30 mg Oral Q6H    NUTRITIONAL SUPPORT ELECTROLYTE PRN ORDERS   Does Not Apply PRN    acetaminophen (TYLENOL) tablet 500 mg  500 mg Oral Q4H PRN    busPIRone (BUSPAR) tablet 5 mg  5 mg Oral BID    ALPRAZolam (XANAX) tablet 0.25 mg  0.25 mg Oral Q8H PRN    morphine injection 2 mg  2 mg IntraVENous Q4H PRN    metoclopramide HCl (REGLAN) injection 5 mg  5 mg IntraVENous Q6H    sodium chloride (NS) flush 5 mL  5 mL InterCATHeter Q8H    sodium chloride (NS) flush 5-10 mL  5-10 mL InterCATHeter PRN    metroNIDAZOLE (FLAGYL) IVPB premix 500 mg  500 mg IntraVENous Q8H    ondansetron (ZOFRAN) injection 4 mg  4 mg IntraVENous Q6H PRN    influenza vaccine 2016-17 (36mos+)(PF) (FLUZONE/FLUARIX/FLULAVAL QUAD) injection 0.5 mL  0.5 mL IntraMUSCular PRIOR TO DISCHARGE    insulin regular (NOVOLIN R, HUMULIN R) injection   SubCUTAneous Q6H    cefTRIAXone (ROCEPHIN) 1 g in 0.9% sodium chloride (MBP/ADV) 50 mL  1 g IntraVENous Q24H    pantoprazole (PROTONIX) injection 40 mg  40 mg IntraVENous Q12H    sucralfate (CARAFATE) 100 mg/mL oral suspension 1 g  1 g Oral AC&HS    sodium chloride (NS) flush 5-10 mL  5-10 mL IntraVENous PRN         Physical Exam   Constitutional: No distress. HENT:   Head: Normocephalic. Neck: Normal range of motion. Cardiovascular:   Murmur heard. Systolic murmur is present with a grade of 2/6   Abdominal: Soft. Musculoskeletal: She exhibits edema. Skin: Skin is warm. Data Review:   Recent Labs      02/13/17   0645  02/12/17   2030  02/12/17   0625   NA  145  146*  144   K  3.0*  3.3*  3.3*   MG  2.3   --   1.9   BUN  17  15  13   CREA  0.95  1.02*  0.97   GLU  121*  166*  143*       Assessment/Plan:     Active Problems:    Pulmonary infiltrate (2/5/2017)      Bilateral leg edema (2/6/2017)      Esophagitis determined by endoscopy (2/7/2017)      Debility (2/9/2017)      Atrial fibrillation with RVR ([address] (2/10/2017)      Pulmonary hypertension ([address] (2/12/2017)      Overview:  Left ventricle: Systolic function was hyperdynamic. Ejection fraction was      estimated in the range of 70 % to 75 %. Septal flattening consistent with       RV      pressure and volume overload There were no regional wall motion       abnormalities. PAP est 65mmhg by TR     Patient's status discussed with patient and her daughter who was present in the room. Because pathophysiology of pulmonary hypertension and that further workup would require invasive testing. The time of our exam 2/13/2017 he does not appear that they are interested in any invasive testing at this time. We discussed that the current therapy of diuretics would be most reasonable course of action. Patient on Lasix 80 mg daily. Aggressive rate control and negative inotropic agents should be avoided. Additional consideration goals of care as appropriate. -  Right ventricle: The ventricle was markedly dilated.  Systolic function       was      reduced.           Deanne Farrell MD  2/13/2017 10:28 AM

## 2017-02-13 NOTE — PROGRESS NOTES
Elena Tsai  Admission Date: 2/5/2017             Daily Progress Note: 2/13/2017    The patient's chart is reviewed and the patient is discussed with the staff. 80yoF with a PMH of HTN, syncope, mild dementia who was recently hospitalized at [facility] after a fall and was discharged to rehab. Presents to ER from her rehab with hypotension and given IVFs but required Levophed. Had a recent UTI with E. Coli and treated with antibiotics. She has had persistent cough since December. Creatinine was elevated 2.12 and CXR is notable for possible retrocardiac infiltrate. GI consulted for N/V and EGD with severe erosive esophagitis, biopsy at EG junction, large hiatal hernia and mild gastroduodenitis. Flex sig with large hemorrhoids. Weaned off Levophed, diet advanced to clear liquids and moved to the floor. Placed on Opti-flow for shortness of breath. Overall doing poorly and family decided to make patient DNR on 2/10. Also started to have AFIB with RVR on 2/10. Seen by cardiology. Subjective:     Looks bad. Complaining of leg pain. Wants a shot to feel better. Very debilitated.     Current Facility-Administered Medications   Medication Dose Route Frequency    promethazine (PHENERGAN) with saline injection 12.5 mg  12.5 mg IntraVENous Q6H PRN    furosemide (LASIX) tablet 80 mg  80 mg Oral DAILY    spironolactone (ALDACTONE) tablet 25 mg  25 mg Oral BID    enoxaparin (LOVENOX) injection 40 mg  40 mg SubCUTAneous Q24H    hydrocortisone (ANUSOL-HC) 2.5 % rectal cream   PeriANAL QID PRN    digoxin (LANOXIN) tablet 0.125 mg  0.125 mg Oral DAILY    dilTIAZem (CARDIZEM) IR tablet 30 mg  30 mg Oral Q6H    NUTRITIONAL SUPPORT ELECTROLYTE PRN ORDERS   Does Not Apply PRN    acetaminophen (TYLENOL) tablet 500 mg  500 mg Oral Q4H PRN    busPIRone (BUSPAR) tablet 5 mg  5 mg Oral BID    ALPRAZolam (XANAX) tablet 0.25 mg  0.25 mg Oral Q8H PRN    morphine injection 2 mg  2 mg IntraVENous Q4H PRN    metoclopramide HCl (REGLAN) injection 5 mg  5 mg IntraVENous Q6H    sodium chloride (NS) flush 5 mL  5 mL InterCATHeter Q8H    sodium chloride (NS) flush 5-10 mL  5-10 mL InterCATHeter PRN    metroNIDAZOLE (FLAGYL) IVPB premix 500 mg  500 mg IntraVENous Q8H    ondansetron (ZOFRAN) injection 4 mg  4 mg IntraVENous Q6H PRN    influenza vaccine - (36mos+)(PF) (FLUZONE/FLUARIX/FLULAVAL QUAD) injection 0.5 mL  0.5 mL IntraMUSCular PRIOR TO DISCHARGE    insulin regular (NOVOLIN R, HUMULIN R) injection   SubCUTAneous Q6H    cefTRIAXone (ROCEPHIN) 1 g in 0.9% sodium chloride (MBP/ADV) 50 mL  1 g IntraVENous Q24H    pantoprazole (PROTONIX) injection 40 mg  40 mg IntraVENous Q12H    sucralfate (CARAFATE) 100 mg/mL oral suspension 1 g  1 g Oral AC&HS    sodium chloride (NS) flush 5-10 mL  5-10 mL IntraVENous PRN       Review of Systems  Constitutional: negative for fever, chills, sweats  Cardiovascular: negative for chest pain, palpitations, syncope, edema  Gastrointestinal:  negative for dysphagia, reflux, vomiting, diarrhea, abdominal pain, or melena  Neurologic:  negative for focal weakness, numbness, headache    Objective:     Vitals:    17 0452 17 0500 17 0743 17 1140   BP: 107/51  102/58 117/58   Pulse: 81  84 88   Resp: 20  20 18   Temp: 97.3 °F (36.3 °C)  97.1 °F (36.2 °C) 97.9 °F (36.6 °C)   SpO2: 98%  97% 100%   Weight:  136 lb 6.4 oz (61.9 kg)     Height:         Intake and Output:    1901 -  0700  In: 440 [P.O.:440]  Out: 2901 [Urine:2900]   07 -  190  In: 60 [P.O.:60]  Out: -     Physical Exam:   Constitution:  the patient is weak, elderly, dementia and in no acute distress on HFNC  EENMT:  Sclera clear, pupils equal, oral mucosa moist  Respiratory:  sounds in bases. . No crackles today. No wheezing. Does have mild tachypnea.    Cardiovascular:  Irregular, AFib without M,G,R  Gastrointestinal: soft and non-tender; with positive bowel sounds, nausea at times. Musculoskeletal: warm without cyanosis. There is trace b/l lower leg and pedal edema. Skin:  no jaundice or rashes, no wounds   Neurologic: no gross neuro deficits     Psychiatric:  alert and oriented x 1    CHEST XRAY: noted rotated with left base atelectasis/effusion. Right is more clear today             LAB  Recent Labs      02/13/17   1118  02/13/17   0615  02/13/17   0010  02/12/17   1619  02/12/17   1124   GLUCPOC  193*  120*  152*  154*  157*      No results for input(s): WBC, HGB, HCT, PLT, INR, HGBEXT, HCTEXT, PLTEXT, HGBEXT, HCTEXT, PLTEXT in the last 72 hours. No lab exists for component: INREXT, INREXT  Recent Labs      02/13/17   0645  02/12/17   2030  02/12/17   0625  02/11/17   0605   NA  145  146*  144  144   K  3.0*  3.3*  3.3*  3.5   CL  105  105  106  108*   CO2  33*  33*  30  26   GLU  121*  166*  143*  133*   BUN  17  15  13  14   CREA  0.95  1.02*  0.97  0.93   MG  2.3   --   1.9  1.8   CA  7.7*  7.7*  7.6*  7.8*   PHOS   --    --    --   1.1*     No results for input(s): PH, PCO2, PO2, HCO3 in the last 72 hours. No results for input(s): LCAD, LAC in the last 72 hours. Assessment:  (Medical Decision Making)     Hospital Problems  Date Reviewed: 2/9/2017          Codes Class Noted POA    Pulmonary [facility] ([facility] ICD-10-CM: I27.2  ICD-9-CM: 416.8  2/12/2017 Unknown    Overview Signed 2/12/2017 12:41 PM by Wing Yuni MD      Left ventricle: Systolic function was hyperdynamic. Ejection fraction was  estimated in the range of 70 % to 75 %. Septal flattening consistent with RV  pressure and volume overload There were no regional wall motion   abnormalities. PAP est 65mmhg by TR     -  Right ventricle: The ventricle was markedly dilated. Systolic function was  reduced. Severe; doing poorly    Atrial fibrillation with RVR [facility]) ICD-10-CM: I48.91  ICD-9-CM: 427.31  2/10/2017 Unknown    Per cardiology.     Debility (Chronic) ICD-10-CM: R53.81  ICD-9-CM: 799.3  2/9/2017 Yes    Severe    Esophagitis determined by endoscopy ICD-10-CM: K20.9  ICD-9-CM: 530.10  2/7/2017 Yes    Protonix    Bilateral leg edema ICD-10-CM: R60.0  ICD-9-CM: 782.3  2/6/2017 Yes    Mild    Pulmonary infiltrate ICD-10-CM: R91.8  ICD-9-CM: 793.19  2/5/2017 Yes    Completed ATB            Plan:  (Medical Decision Making)     Replete K. Give morphine as needed for pain. Palliative care consult. Family exhausted. Not a candidate for pulmonary vasodilator Rx. Prognosis is overall guarded to poor. More than 50% of the time documented was spent in face-to-face contact with the patient and in the care of the patient on the floor/unit where the patient is located. Franca Joyner MD      This note was signed electronically.

## 2017-02-13 NOTE — PROGRESS NOTES
Homa Nathan  Admission Date: 2/5/2017             Daily Progress Note: 2/13/2017    The patient's chart is reviewed and the patient is discussed with the staff. 80yoF with a PMH of HTN, syncope, mild dementia who was recently hospitalized at [facility] after a fall and was discharged to rehab. Presents to ER from her rehab with hypotension and given IVFs but required Levophed. Had a recent UTI with E. Coli and treated with antibiotics. She has had persistent cough since December. Creatinine was elevated 2.12 and CXR is notable for possible retrocardiac infiltrate. GI consulted for N/V and EGD with severe erosive esophagitis, biopsy at EG junction, large hiatal hernia and mild gastroduodenitis. Flex sig with large hemorrhoids. Weaned off Levophed, diet advanced to clear liquids and moved to the floor. Placed on Opti-flow for shortness of breath. Overall doing poorly and family decided to make patient DNR on 2/10. Also started to have AFIB with RVR on 2/10. Seen by cardiology. Subjective:     Patient in bed on optiflow at 40% and 35 LPM. Feels less dyspnea. No cough. No wheezing. Wants to sit in chair. Feels that her bladder is full.  Making a lot of urine    Current Facility-Administered Medications   Medication Dose Route Frequency    promethazine (PHENERGAN) with saline injection 12.5 mg  12.5 mg IntraVENous Q6H PRN    furosemide (LASIX) tablet 80 mg  80 mg Oral DAILY    spironolactone (ALDACTONE) tablet 25 mg  25 mg Oral BID    enoxaparin (LOVENOX) injection 40 mg  40 mg SubCUTAneous Q24H    hydrocortisone (ANUSOL-HC) 2.5 % rectal cream   PeriANAL QID PRN    digoxin (LANOXIN) tablet 0.125 mg  0.125 mg Oral DAILY    dilTIAZem (CARDIZEM) IR tablet 30 mg  30 mg Oral Q6H    NUTRITIONAL SUPPORT ELECTROLYTE PRN ORDERS   Does Not Apply PRN    acetaminophen (TYLENOL) tablet 500 mg  500 mg Oral Q4H PRN    busPIRone (BUSPAR) tablet 5 mg  5 mg Oral BID    ALPRAZolam Efrain Mark) tablet 0.25 mg  0.25 mg Oral Q8H PRN    morphine injection 2 mg  2 mg IntraVENous Q4H PRN    metoclopramide HCl (REGLAN) injection 5 mg  5 mg IntraVENous Q6H    sodium chloride (NS) flush 5 mL  5 mL InterCATHeter Q8H    sodium chloride (NS) flush 5-10 mL  5-10 mL InterCATHeter PRN    metroNIDAZOLE (FLAGYL) IVPB premix 500 mg  500 mg IntraVENous Q8H    ondansetron (ZOFRAN) injection 4 mg  4 mg IntraVENous Q6H PRN    influenza vaccine - (36mos+)(PF) (FLUZONE/FLUARIX/FLULAVAL QUAD) injection 0.5 mL  0.5 mL IntraMUSCular PRIOR TO DISCHARGE    insulin regular (NOVOLIN R, HUMULIN R) injection   SubCUTAneous Q6H    cefTRIAXone (ROCEPHIN) 1 g in 0.9% sodium chloride (MBP/ADV) 50 mL  1 g IntraVENous Q24H    pantoprazole (PROTONIX) injection 40 mg  40 mg IntraVENous Q12H    sucralfate (CARAFATE) 100 mg/mL oral suspension 1 g  1 g Oral AC&HS    sodium chloride (NS) flush 5-10 mL  5-10 mL IntraVENous PRN       Review of Systems  Constitutional: negative for fever, chills, sweats  Cardiovascular: negative for chest pain, palpitations, syncope, edema  Gastrointestinal:  negative for dysphagia, reflux, vomiting, diarrhea, abdominal pain, or melena  Neurologic:  negative for focal weakness, numbness, headache    Objective:     Vitals:    17 0452 17 0500 17 0743 17 1140   BP: 107/51  102/58 117/58   Pulse: 81  84 88   Resp: 20  20 18   Temp: 97.3 °F (36.3 °C)  97.1 °F (36.2 °C) 97.9 °F (36.6 °C)   SpO2: 98%  97% 100%   Weight:  136 lb 6.4 oz (61.9 kg)     Height:         Intake and Output:    1901 -  0700  In: 440 [P.O.:440]  Out: 2901 [Urine:2900]   0701 -  1900  In: 60 [P.O.:60]  Out: -     Physical Exam:   Constitution:  the patient is weak, elderly, dementia and in no acute distress Opti-flow 35 LPM, 40%,  EENMT:  Sclera clear, pupils equal, oral mucosa moist  Respiratory:  sounds in bases. . No crackles today. No wheezing. Does have mild tachypnea. Cardiovascular:  Irregular, AFib without M,G,R  Gastrointestinal: soft and non-tender; with positive bowel sounds, nausea at times. Musculoskeletal: warm without cyanosis. There is tace b/l lower leg and pedal edema. Skin:  no jaundice or rashes, no wounds   Neurologic: no gross neuro deficits     Psychiatric:  alert and oriented x 1    CHEST XRAY: noted rotated with left base atelectasis/effusion. Right is more clear today             LAB  Recent Labs      02/13/17   0615  02/13/17   0010  02/12/17   1619  02/12/17   1124  02/12/17   0523   GLUCPOC  120*  152*  154*  157*  137*      No results for input(s): WBC, HGB, HCT, PLT, INR, HGBEXT, HCTEXT, PLTEXT, HGBEXT, HCTEXT, PLTEXT in the last 72 hours. No lab exists for component: INREXT, INREXT  Recent Labs      02/13/17   0645  02/12/17   2030  02/12/17   0625  02/11/17   0605   NA  145  146*  144  144   K  3.0*  3.3*  3.3*  3.5   CL  105  105  106  108*   CO2  33*  33*  30  26   GLU  121*  166*  143*  133*   BUN  17  15  13  14   CREA  0.95  1.02*  0.97  0.93   MG  2.3   --   1.9  1.8   CA  7.7*  7.7*  7.6*  7.8*   PHOS   --    --    --   1.1*     No results for input(s): PH, PCO2, PO2, HCO3 in the last 72 hours. No results for input(s): LCAD, LAC in the last 72 hours. Assessment:  (Medical Decision Making)     Hospital Problems  Date Reviewed: 2/9/2017          Codes Class Noted POA    [facility] ICD-10-CM: I27.2  ICD-9-CM: 416.8  2/12/2017 Unknown    Overview Signed 2/12/2017 12:41 PM by Maritza Barragan MD      Left ventricle: Systolic function was hyperdynamic. Ejection fraction was  estimated in the range of 70 % to 75 %. Septal flattening consistent with RV  pressure and volume overload There were no regional wall motion   abnormalities. PAP est 65mmhg by TR     -  Right ventricle: The ventricle was markedly dilated. Systolic function was  reduced.              Atrial fibrillation with RVR (HCC) ICD-10-CM: I48.91  ICD-9-CM: 427.31 2/10/2017 Unknown        Debility (Chronic) ICD-10-CM: R53.81  ICD-9-CM: 799.3  2/9/2017 Yes        Esophagitis determined by endoscopy ICD-10-CM: K20.9  ICD-9-CM: 530.10  2/7/2017 Yes        Bilateral leg edema ICD-10-CM: R60.0  ICD-9-CM: 782.3  2/6/2017 Yes        Pulmonary infiltrate ICD-10-CM: R91.8  ICD-9-CM: 793.19  2/5/2017 Yes                Plan:  (Medical Decision Making)     --make excellent diureses. Will replace lytes. Will change to PO tomorrow, pending condition. --tray on HFNC today vs optiflow. --cardiology for AFIB and now in 90's   --add incentive spirometry  --stopped abx -- s/o  Rocephin, Flagyl D7. Cultures are negative.   --PT/OT for mobility  --DNR  --continue remaining treatment. Prognosis is overall guarded to poor. More than 50% of the time documented was spent in face-to-face contact with the patient and in the care of the patient on the floor/unit where the patient is located. Sonia Mcclure MD      This note was signed electronically.

## 2017-02-13 NOTE — PROGRESS NOTES
Cathflo ordered for line patency. The brown lumen would flush but not aspirate blood. 2mg Cathflo should be administered to all lumens and left to sit for at least 30 mins without anything infusing.

## 2017-02-13 NOTE — PROGRESS NOTES
Pt resting quietly in bed, HOB elevated, family at bedside. Pt alert, drowsy on Optiflow. Lung sounds diminished, S1/S2 audible, peripheral pulses palpable, extremities warm. Pt has EJ to right neck, site clean, dry, and intact. Pt has white draining clear yellow urine to bag at bedside. Pt denies pain or SOB at this time. Call light in place, pt instructed to call for assistance as needed.

## 2017-02-14 ENCOUNTER — APPOINTMENT (OUTPATIENT)
Dept: GENERAL RADIOLOGY | Age: 82
DRG: 871 | End: 2017-02-14
Attending: NURSE PRACTITIONER
Payer: MEDICARE

## 2017-02-14 PROBLEM — I48.91 ATRIAL FIBRILLATION WITH RVR (HCC): Status: RESOLVED | Noted: 2017-02-10 | Resolved: 2017-02-14

## 2017-02-14 PROBLEM — J96.00 ACUTE RESPIRATORY FAILURE (HCC): Status: ACTIVE | Noted: 2017-02-14

## 2017-02-14 PROBLEM — I27.20 PULMONARY HYPERTENSION (HCC): Chronic | Status: ACTIVE | Noted: 2017-02-12

## 2017-02-14 LAB
ALBUMIN SERPL BCP-MCNC: 2.1 G/DL (ref 3.2–4.6)
ALBUMIN/GLOB SERPL: 0.5 {RATIO} (ref 1.2–3.5)
ALP SERPL-CCNC: 58 U/L (ref 50–136)
ALT SERPL-CCNC: 11 U/L (ref 12–65)
ANION GAP BLD CALC-SCNC: 9 MMOL/L (ref 7–16)
AST SERPL W P-5'-P-CCNC: 10 U/L (ref 15–37)
BILIRUB SERPL-MCNC: 0.3 MG/DL (ref 0.2–1.1)
BNP SERPL-MCNC: 601 PG/ML
BUN SERPL-MCNC: 12 MG/DL (ref 8–23)
CALCIUM SERPL-MCNC: 7.6 MG/DL (ref 8.3–10.4)
CHLORIDE SERPL-SCNC: 98 MMOL/L (ref 98–107)
CO2 SERPL-SCNC: 35 MMOL/L (ref 21–32)
CREAT SERPL-MCNC: 1.05 MG/DL (ref 0.6–1)
ERYTHROCYTE [DISTWIDTH] IN BLOOD BY AUTOMATED COUNT: 14.5 % (ref 11.9–14.6)
GLOBULIN SER CALC-MCNC: 3.9 G/DL (ref 2.3–3.5)
GLUCOSE BLD STRIP.AUTO-MCNC: 125 MG/DL (ref 65–100)
GLUCOSE BLD STRIP.AUTO-MCNC: 129 MG/DL (ref 65–100)
GLUCOSE BLD STRIP.AUTO-MCNC: 136 MG/DL (ref 65–100)
GLUCOSE BLD STRIP.AUTO-MCNC: 155 MG/DL (ref 65–100)
GLUCOSE SERPL-MCNC: 218 MG/DL (ref 65–100)
HCT VFR BLD AUTO: 36.3 % (ref 35.8–46.3)
HGB BLD-MCNC: 11.3 G/DL (ref 11.7–15.4)
MCH RBC QN AUTO: 30.5 PG (ref 26.1–32.9)
MCHC RBC AUTO-ENTMCNC: 31.1 G/DL (ref 31.4–35)
MCV RBC AUTO: 97.8 FL (ref 79.6–97.8)
PLATELET # BLD AUTO: 344 K/UL (ref 150–450)
PMV BLD AUTO: 10.5 FL (ref 10.8–14.1)
POTASSIUM SERPL-SCNC: 2.5 MMOL/L (ref 3.5–5.1)
PROT SERPL-MCNC: 6 G/DL (ref 6.3–8.2)
RBC # BLD AUTO: 3.71 M/UL (ref 4.05–5.25)
SODIUM SERPL-SCNC: 142 MMOL/L (ref 136–145)
WBC # BLD AUTO: 8.2 K/UL (ref 4.3–11.1)

## 2017-02-14 PROCEDURE — 82962 GLUCOSE BLOOD TEST: CPT

## 2017-02-14 PROCEDURE — 74011250637 HC RX REV CODE- 250/637: Performed by: INTERNAL MEDICINE

## 2017-02-14 PROCEDURE — 74011250636 HC RX REV CODE- 250/636: Performed by: NURSE PRACTITIONER

## 2017-02-14 PROCEDURE — 74011250637 HC RX REV CODE- 250/637: Performed by: NURSE PRACTITIONER

## 2017-02-14 PROCEDURE — 74011000250 HC RX REV CODE- 250: Performed by: INTERNAL MEDICINE

## 2017-02-14 PROCEDURE — C9113 INJ PANTOPRAZOLE SODIUM, VIA: HCPCS | Performed by: NURSE PRACTITIONER

## 2017-02-14 PROCEDURE — 99232 SBSQ HOSP IP/OBS MODERATE 35: CPT | Performed by: INTERNAL MEDICINE

## 2017-02-14 PROCEDURE — 83880 ASSAY OF NATRIURETIC PEPTIDE: CPT | Performed by: NURSE PRACTITIONER

## 2017-02-14 PROCEDURE — 36415 COLL VENOUS BLD VENIPUNCTURE: CPT | Performed by: NURSE PRACTITIONER

## 2017-02-14 PROCEDURE — 94760 N-INVAS EAR/PLS OXIMETRY 1: CPT

## 2017-02-14 PROCEDURE — 74011250636 HC RX REV CODE- 250/636: Performed by: INTERNAL MEDICINE

## 2017-02-14 PROCEDURE — 71010 XR CHEST SNGL V: CPT

## 2017-02-14 PROCEDURE — 85027 COMPLETE CBC AUTOMATED: CPT | Performed by: NURSE PRACTITIONER

## 2017-02-14 PROCEDURE — 80053 COMPREHEN METABOLIC PANEL: CPT | Performed by: NURSE PRACTITIONER

## 2017-02-14 PROCEDURE — 65270000029 HC RM PRIVATE

## 2017-02-14 RX ORDER — DICYCLOMINE HYDROCHLORIDE 10 MG/1
10 CAPSULE ORAL
Status: DISCONTINUED | OUTPATIENT
Start: 2017-02-14 | End: 2017-02-16

## 2017-02-14 RX ORDER — ONDANSETRON 2 MG/ML
4 INJECTION INTRAMUSCULAR; INTRAVENOUS EVERY 8 HOURS
Status: DISCONTINUED | OUTPATIENT
Start: 2017-02-14 | End: 2017-02-15

## 2017-02-14 RX ORDER — DIAZEPAM 2 MG/1
2 TABLET ORAL
Status: DISCONTINUED | OUTPATIENT
Start: 2017-02-14 | End: 2017-02-14

## 2017-02-14 RX ORDER — DIAZEPAM 2 MG/1
2 TABLET ORAL 2 TIMES DAILY
Status: DISCONTINUED | OUTPATIENT
Start: 2017-02-14 | End: 2017-02-15

## 2017-02-14 RX ORDER — HYDROCODONE BITARTRATE AND HOMATROPINE METHYLBROMIDE 1.5; 5 MG/5ML; MG/5ML
5 SYRUP ORAL
Status: DISCONTINUED | OUTPATIENT
Start: 2017-02-14 | End: 2017-02-17 | Stop reason: HOSPADM

## 2017-02-14 RX ORDER — POTASSIUM CHLORIDE 20MEQ/15ML
40 LIQUID (ML) ORAL
Status: ACTIVE | OUTPATIENT
Start: 2017-02-14 | End: 2017-02-15

## 2017-02-14 RX ADMIN — DICYCLOMINE HYDROCHLORIDE 10 MG: 10 CAPSULE ORAL at 17:11

## 2017-02-14 RX ADMIN — CARBIDOPA AND LEVODOPA 5 MG: 25; 100 TABLET, EXTENDED RELEASE ORAL at 08:06

## 2017-02-14 RX ADMIN — DIGOXIN 0.12 MG: 0.12 TABLET ORAL at 08:06

## 2017-02-14 RX ADMIN — DILTIAZEM HYDROCHLORIDE 30 MG: 30 TABLET, FILM COATED ORAL at 08:06

## 2017-02-14 RX ADMIN — METOCLOPRAMIDE 5 MG: 5 INJECTION, SOLUTION INTRAMUSCULAR; INTRAVENOUS at 12:00

## 2017-02-14 RX ADMIN — PANTOPRAZOLE SODIUM 40 MG: 40 INJECTION, POWDER, FOR SOLUTION INTRAVENOUS at 08:06

## 2017-02-14 RX ADMIN — DILTIAZEM HYDROCHLORIDE 30 MG: 30 TABLET, FILM COATED ORAL at 17:07

## 2017-02-14 RX ADMIN — SUCRALFATE 1 G: 1 SUSPENSION ORAL at 05:27

## 2017-02-14 RX ADMIN — SUCRALFATE 1 G: 1 SUSPENSION ORAL at 17:07

## 2017-02-14 RX ADMIN — SUCRALFATE 1 G: 1 SUSPENSION ORAL at 11:51

## 2017-02-14 RX ADMIN — CARBIDOPA AND LEVODOPA 5 MG: 25; 100 TABLET, EXTENDED RELEASE ORAL at 17:07

## 2017-02-14 RX ADMIN — Medication 5 ML: at 06:00

## 2017-02-14 RX ADMIN — SODIUM CHLORIDE 12.5 MG: 9 INJECTION INTRAMUSCULAR; INTRAVENOUS; SUBCUTANEOUS at 12:18

## 2017-02-14 RX ADMIN — Medication 5 ML: at 22:04

## 2017-02-14 RX ADMIN — SPIRONOLACTONE 25 MG: 25 TABLET ORAL at 08:06

## 2017-02-14 RX ADMIN — ENOXAPARIN SODIUM 40 MG: 40 INJECTION SUBCUTANEOUS at 05:24

## 2017-02-14 RX ADMIN — DILTIAZEM HYDROCHLORIDE 30 MG: 30 TABLET, FILM COATED ORAL at 20:11

## 2017-02-14 RX ADMIN — DIAZEPAM 2 MG: 2 TABLET ORAL at 17:12

## 2017-02-14 RX ADMIN — METRONIDAZOLE 500 MG: 500 INJECTION, SOLUTION INTRAVENOUS at 08:06

## 2017-02-14 RX ADMIN — ONDANSETRON 4 MG: 2 INJECTION INTRAMUSCULAR; INTRAVENOUS at 22:03

## 2017-02-14 RX ADMIN — METRONIDAZOLE 500 MG: 500 INJECTION, SOLUTION INTRAVENOUS at 01:43

## 2017-02-14 RX ADMIN — FUROSEMIDE 80 MG: 40 TABLET ORAL at 08:06

## 2017-02-14 RX ADMIN — SPIRONOLACTONE 25 MG: 25 TABLET ORAL at 17:09

## 2017-02-14 RX ADMIN — DILTIAZEM HYDROCHLORIDE 30 MG: 30 TABLET, FILM COATED ORAL at 01:48

## 2017-02-14 RX ADMIN — PANTOPRAZOLE SODIUM 40 MG: 40 INJECTION, POWDER, FOR SOLUTION INTRAVENOUS at 20:18

## 2017-02-14 RX ADMIN — ONDANSETRON 4 MG: 2 INJECTION INTRAMUSCULAR; INTRAVENOUS at 17:07

## 2017-02-14 RX ADMIN — Medication 5 ML: at 14:00

## 2017-02-14 RX ADMIN — SODIUM CHLORIDE 12.5 MG: 9 INJECTION INTRAMUSCULAR; INTRAVENOUS; SUBCUTANEOUS at 05:25

## 2017-02-14 RX ADMIN — HYDROCODONE BITARTRATE AND HOMATROPINE METHYLBROMIDE 5 ML: 5; 1.5 SOLUTION ORAL at 20:11

## 2017-02-14 RX ADMIN — METOCLOPRAMIDE 5 MG: 5 INJECTION, SOLUTION INTRAMUSCULAR; INTRAVENOUS at 05:26

## 2017-02-14 RX ADMIN — ONDANSETRON 4 MG: 2 INJECTION INTRAMUSCULAR; INTRAVENOUS at 09:44

## 2017-02-14 NOTE — PROGRESS NOTES
Patient stable with no complaint at this time. Call light within reach.   Report to be given to oncoming RN 7p-7a

## 2017-02-14 NOTE — PROGRESS NOTES
Palliative Care Progress Note    Patient: Bárbara Booth MRN: [id number]  SSN: [id number]    YOB: 1934  Age: 80 y.o. Sex: female       Assessment/Plan:     Chief Complaint/Interval History: Ongoing nausea, debility     Principal Diagnosis:    · Nausea/Vomiting  R11.2    Additional Diagnoses:   Debility, Unspecified  R53.81  Dementia  F03.90   Fatigue, Lethargy  R53.83  Pain, limb  M79.609  Counseling, Encounter for Medical Advice  Z71.9  Encounter for Palliative Care  Z51.5    Palliative Performance Scale (PPS)  PPS: 40    Medical Decision Making:   Reviewed and summarized notes over previous 24 hours. Discussed case with appropriate providers: Dr. Jarod Scott  Reviewed laboratory and x-ray data. Patient resting in bed, daughter in law is at the bedside. Patient states that she's \"been better\" and has had a tough day with nausea. Will schedule Zofran every 8 hours and leave Phenergan prn. Patient also complaining of muscle cramps. Will provide Valium every 6  Hours prn. Patient likely needs scheduled potassium replacement, but unsure how that would be tolerated in light of her GI issues. Family meeting planned for 4pm to discuss goals and plan of care. 12- Family meeting held with patient's son, daughter-in-law, and sister. Provided overview of medical condition and prognosis. Family verbalizes good understanding of patient's prognosis in terms of her pulmonary hypertension. However, patient still with significant GI symptoms and family desires ongoing evaluation for this for palliative purposes. Overall, they state that patient's current state would not be an acceptable quality of life to her, mainly due to GI symptoms. Discussed ongoing workup for better controlled nausea/vomiting. If at that point, patient is able to participate in therapy, they are hopeful that patient could tolerate another trial of STR prior to going home.   Affirmed this hope, but advised that she may not tolerate due to pulmonary hypertension. However, if patient does not improve and/or does not tolerate therapies well, they would be receptive to transition home with hospice. They would like to assess the above for the next day or two. Will discuss findings with members of the interdisciplinary team.         More than 50% of this 35 minute visit was spent counseling and coordination of care as outlined above. Subjective:     Review of Systems:  A comprehensive review of systems was negative except for:   Gastrointestinal: Positive for nausea. Objective:     Visit Vitals    BP 90/52 (BP 1 Location: Right arm, BP Patient Position: At rest)    Pulse 74    Temp 98.2 °F (36.8 °C)    Resp 20    Ht 5' 3\" (1.6 m)    Wt 58.1 kg (128 lb)    SpO2 100%    Breastfeeding No    BMI 22.67 kg/m2       Physical Exam:    General:  Elderly and debilitated appearing. Cooperative. No acute distress. Eyes:  Conjunctivae/corneas clear. Nose: Nares normal. Septum midline. O2 via HFNC   Neck: Supple, symmetrical, trachea midline. Lungs:   Diminished bilaterally, unlabored. Heart:  Regular rate and rhythm. Abdomen:   Soft, non-tender, non-distended. Extremities: Normal, atraumatic, no cyanosis. 1+ edema. Skin: Skin color, texture, turgor normal. No rash. Neurologic: Nonfocal.   Psych: Alert and oriented to person and place.      Signed By: Royer Glover NP     February 14, 2017

## 2017-02-14 NOTE — CONSULTS
[facility] Consult Note       Primary GI Physician:     Referring Physician:  Ayo Sheppard NP    Consult Date:  2/14/2017    Admit Date:  2/5/2017    Chief Complaint:  N/V    Subjective:     History of Present Illness:  Patient is a 80 y.o. female seen in reconsultation at the request of Ayo Sheppard NP for nausea. She was last seen by Dr Yamile West this admission 2/8/17 for the same. She was admitted with a one month hx of n/v and poor appetite with recurrent UTI and septic shock. Chest Xray on admission was suggestive of retrocardiac density which appeared minimal on abdominal CT. There was also rectal wall thickening seen on CT scan. Endoscopy on 2/5/17 revealed moderately severe erosive esophagitis, a polyp at the EG junction that was biopsied, a large hiatal hernia and mild gastroduodenitis. There was no obstruction seen to the proximal 3rd portion of the duodenum and no retained food or liquid in stomach. Flexible sigmoidoscopy to 40 cm was normal, other than for large hemorrhoids. She was improved but developed atrial fibrillation with RVR 2/10. She was placed on Cardizem and Digoxin per cardiology. She has now been made a DNR by her family. She has continued to have recurrent nausea and is now on scheduled anti-emetics, bid Protonix and Carafate, as well as Reglan. Her family notes increased agitation with the Reglan and have not noted any improvement with the medication. She has had long term hx of IBS symptoms in the past and was on Librax for years for this. She is vomiting as soon as solid food hits her stomach but appears to be tolerating liquids well.     PMH:  Past Medical History   Diagnosis Date    Arrhythmia      by history/ not at present    CAD (coronary artery disease)      mitral valve prolapse    Chronic pain     Gastrointestinal disorder      GERD    Gastrointestinal disorder      gallstones    GERD (gastroesophageal reflux disease)        PSH:  Past Surgical History   Procedure Laterality Date    Hx hysterectomy      Pr breast surgery procedure unlisted       lumpectomy on RT breast    Flexible sigmoidoscopy N/A 2/7/2017     SIGMOIDOSCOPY FLEXIBLE at bedside performed by Tania Pratt MD at [facility] ENDOSCOPY       Allergies: Allergies   Allergen Reactions    Aspirin Nausea and Vomiting    Pcn [Penicillins] Itching       Home Medications:  Prior to Admission medications    Medication Sig Start Date End Date Taking? Authorizing Provider   metoprolol succinate (TOPROL-XL) 25 mg XL tablet Take 25 mg by mouth daily. Yes Historical Provider   pantoprazole (PROTONIX) 40 mg tablet Take 40 mg by mouth daily. Yes Historical Provider   furosemide (LASIX) 20 mg tablet Take 20 mg by mouth daily. Yes Historical Provider   potassium chloride SR (KLOR-CON 10) 10 mEq tablet Take 10 mEq by mouth daily. Yes Historical Provider   sucralfate (CARAFATE) 100 mg/mL suspension Take 1 tsp by mouth four (4) times daily as needed. Yes Historical Provider   metoclopramide HCl (REGLAN) 5 mg tablet Take 1 Tab by mouth every six (6) hours as needed for Nausea.  1/30/17   Joseph Woods DO       Hospital Medications:  Current Facility-Administered Medications   Medication Dose Route Frequency    lip protectant (BLISTEX) ointment   Topical PRN    ondansetron (ZOFRAN) injection 4 mg  4 mg IntraVENous Q8H    diazePAM (VALIUM) tablet 2 mg  2 mg Oral Q6H PRN    promethazine (PHENERGAN) with saline injection 12.5 mg  12.5 mg IntraVENous Q6H PRN    furosemide (LASIX) tablet 80 mg  80 mg Oral DAILY    spironolactone (ALDACTONE) tablet 25 mg  25 mg Oral BID    enoxaparin (LOVENOX) injection 40 mg  40 mg SubCUTAneous Q24H    hydrocortisone (ANUSOL-HC) 2.5 % rectal cream   PeriANAL QID PRN    digoxin (LANOXIN) tablet 0.125 mg  0.125 mg Oral DAILY    dilTIAZem (CARDIZEM) IR tablet 30 mg  30 mg Oral Q6H    NUTRITIONAL SUPPORT ELECTROLYTE PRN ORDERS   Does Not Apply PRN    acetaminophen (TYLENOL) tablet 500 mg  500 mg Oral Q4H PRN    busPIRone (BUSPAR) tablet 5 mg  5 mg Oral BID    morphine injection 2 mg  2 mg IntraVENous Q4H PRN    metoclopramide HCl (REGLAN) injection 5 mg  5 mg IntraVENous Q6H    sodium chloride (NS) flush 5 mL  5 mL InterCATHeter Q8H    sodium chloride (NS) flush 5-10 mL  5-10 mL InterCATHeter PRN    influenza vaccine 2016-17 (36mos+)(PF) (FLUZONE/FLUARIX/FLULAVAL QUAD) injection 0.5 mL  0.5 mL IntraMUSCular PRIOR TO DISCHARGE    insulin regular (NOVOLIN R, HUMULIN R) injection   SubCUTAneous Q6H    pantoprazole (PROTONIX) injection 40 mg  40 mg IntraVENous Q12H    sucralfate (CARAFATE) 100 mg/mL oral suspension 1 g  1 g Oral AC&HS    sodium chloride (NS) flush 5-10 mL  5-10 mL IntraVENous PRN       Social History:  Social History   Substance Use Topics    Smoking status: Never Smoker    Smokeless tobacco: Not on file    Alcohol use No       Pt denies any history of drug use, blood transfusions, or tattoos. Family History:  Family History   Problem Relation Age of Onset    Diabetes Mother     Hypertension Mother     Breast Cancer Neg Hx        Review of Systems:  A detailed 10 system ROS is obtained, with pertinent positives as listed above. All others are negative. Diet:  Gi soft    Objective:     Physical Exam:  Vitals:  Visit Vitals    BP 90/52 (BP 1 Location: Right arm, BP Patient Position: At rest)    Pulse 74    Temp 98.2 °F (36.8 °C)    Resp 20    Ht 5' 3\" (1.6 m)    Wt 58.1 kg (128 lb)    SpO2 93%    Breastfeeding No    BMI 22.67 kg/m2     Gen:  Pt is alert, cooperative, no acute distress  Skin:  Extremities and face reveal no rashes. Pale. HEENT: Sclerae anicteric. Extra-occular muscles are intact. No oral ulcers. No abnormal pigmentation of the lips. The neck is supple. Cardiovascular: Regular rate and rhythm. No murmurs, gallops, or rubs. Respiratory:  Comfortable breathing with no accessory muscle use.  Clear breath sounds anteriorly with no wheezes, rales, or rhonchi. GI:  Abdomen nondistended, soft, and nontender. Normal active bowel sounds. No enlargement of the liver or spleen. No masses palpable. Rectal:  Deferred  Neurological:  Gross memory appears intact. Patient is alert and oriented. Psychiatric:  Mood appears appropriate with judgement intact. Lymphatic:  No cervical or supraclavicular adenopathy. Laboratory:    Recent Labs      02/13/17   0645  02/12/17   2030  02/12/17   0625   NA  145  146*  144   K  3.0*  3.3*  3.3*   CL  105  105  106   CO2  33*  33*  30   BUN  17  15  13   CREA  0.95  1.02*  0.97   CA  7.7*  7.7*  7.6*   MG  2.3   --   1.9   GLU  121*  166*  143*          Assessment:     Active Problems:    Pulmonary infiltrate (2/5/2017)      Bilateral leg edema (2/6/2017)      Esophagitis determined by endoscopy (2/7/2017)      Debility (2/9/2017)      Pulmonary hypertension ([address] (2/12/2017)      Overview:  Left ventricle: Systolic function was hyperdynamic. Ejection fraction was      estimated in the range of 70 % to 75 %. Septal flattening consistent with       RV      pressure and volume overload There were no regional wall motion       abnormalities. PAP est 65mmhg by TR             -  Right ventricle: The ventricle was markedly dilated. Systolic function       was      reduced. Acute respiratory failure (HCC) (2/14/2017)    Nausea, vomiting    Plan:     81 yo female is seen with recurrent n/v with recent EGD revealing  moderately severe erosive esophagitis, a polyp at the EG junction that was biopsied, a large hiatal hernia and mild gastroduodenitis. She has had minimal improvement in the nausea with bid Protonix and qid Carafate; she has been placed on scheduled anti-emetic doses today. Family has noted increased agitation with Reglan and are concerned, especially as it has not helped the nausea.   Etiology of nausea is likely multi-factorial given her multiple co morbidities and medications. 1.  Hold Reglan for now  2. Continue scheduled anti-emetics and PPI  3. Trial of Bentyl 10 mg bid ac breakfast and dinner  4. Will follow    Patient is seen and examined in collaboration with Dr. Lilia Bey. Assessment and plan as per Dr. Lilia Bey. Daren Cuello NP    ATTENDING NOTE:  I have seen the patient and agree with the above assessment and plan.     Marcello Lowery MD

## 2017-02-14 NOTE — PROGRESS NOTES
[facility] CARDIOLOGY PROGRESS NOTE           2/14/2017 11:09 AM    Admit Date: 2/5/2017      Subjective:   Resting in bed, dgt at bedside. Palliative care saw patient yesterday and f/u with family at 4 pm today to consider options. ROS:  Cardiovascular:  As noted above    Objective:      Vitals:    02/14/17 0418 02/14/17 0450 02/14/17 0801 02/14/17 1005   BP: 107/55  120/76 90/52   Pulse: 89  75 74   Resp: 18  22 20   Temp: 98 °F (36.7 °C)  97.6 °F (36.4 °C) 98.2 °F (36.8 °C)   SpO2: 96%  99% 100%   Weight:  58.1 kg (128 lb)     Height:           Physical Exam:  General-No Acute Distress  Neck- supple, no JVD  CV- irregular irregular rate  Lung- crackles in bases bilaterally  Abd- soft, nontender, nondistended  Ext- 1+ edema bilaterally. Skin- warm and dry    Data Review:   Recent Labs      02/13/17   0645  02/12/17 2030 02/12/17   0625   NA  145  146*  144   K  3.0*  3.3*  3.3*   MG  2.3   --   1.9   BUN  17  15  13   CREA  0.95  1.02*  0.97   GLU  121*  166*  143*       Assessment/Plan:     Active Problems:    Pulmonary infiltrate (2/5/2017)- on rocephin    Bilateral leg edema (2/6/2017)      Esophagitis determined by endoscopy (2/7/2017)      Debility (2/9/2017)      Atrial fibrillation with RVR ([address] (2/10/2017)- on CCB, and dig      Pulmonary hypertension ([address] (2/12/2017)      Overview:  Left ventricle: Systolic function was hyperdynamic. Ejection fraction was      estimated in the range of 70 % to 75 %. Septal flattening consistent with       RV pressure and volume overload There were no regional wall motion abnormalities. PAP est 65mmhg by TR  Right ventricle: The ventricle was markedly dilated. Systolic function was reduced. Yesterday Dr. Niclole Robles spoke with pt/dgt and decision made for no invasive w/u. Continue lasix daily and conservative approach to rate control (CCB/Dig). Palliative care meeting with family at 4 pm today.        Kendrick Prieto NP  2/14/2017 11:09 AM    Patient seen and examined. Comfort care recommended. Continue present course.      JESSICA Santiago

## 2017-02-14 NOTE — PROGRESS NOTES
Pt resting quietly in bed on 12L O2 via NC. No s/s of acute distress noted. Pt states her nausea is better this morning. Report given to oncoming RN.

## 2017-02-14 NOTE — PROGRESS NOTES
Pt resting quietly in bed, HOB elevated. Pt alert and oriented at this time on 13L O2 via NC. Lung sounds coarse, diminished, with fine crackles, S1/S2 audible, peripheral pulses palpable, extremities warm. Pt running NSR @80 per tele monitor. Pt has IV to right AC, site clean, dry, and intact. Pt has white draining clear gabrielle urine to bag at bedside. Pt denies pain or SOB, states her nausea is better at this time. Call light in place, pt instructed to call for assistance as needed.

## 2017-02-14 NOTE — PROGRESS NOTES
Pt given PO Cardizem, a few minutes later vomited about 15mL of beige fluid. Pt states she feels slightly better after vomiting, will continue to monitor.

## 2017-02-14 NOTE — PROGRESS NOTES
Garrison Oyster  Admission Date: 2/5/2017             Daily Progress Note: 2/14/2017     82yoF with one month of nausea/vomiting/inability to eat, history of UTIs, recent fall who is admitted with septic shock of unclear origin. Chest Xray suggestive of retrocardiac density which appears minor on abdominal CT. Rectal wall thickening on CT. On pressors but weaned to off. EGD reveals moderately severe erosive esophagitis, a polyp at the EG junction that was biopsied, a large hiatal hernia and mild gastroduodenitis. Hemorrhoids seen with flexsig. V/A stable. On carafate and PPI. GI signed off. Tolerating diet. Developed atrial fibrillation with RVR 2/10. Cardiology consulted. Overall doing poorly and family decided to make patient DNR on 2/10.  She will be very preload sensitive regarding her forward cardiac output. . Cardiology recommends to be cautious with diuretics. Pulmonary htn appears advanced and chronic by echo. She does not appear to be a candidate for RHC and phosphodiesterase inhibition. Patient on Lasix 80 mg daily. Aggressive rate control and negative inotropic agents should be avoided.       Subjective:     100% sat on 12 lpm, weaning as tolerated  PC following.    Ongoing nausea    Review of Systems  Gastrointestinal: positive for nausea    Current Facility-Administered Medications   Medication Dose Route Frequency    lip protectant (BLISTEX) ointment   Topical PRN    ondansetron (ZOFRAN) injection 4 mg  4 mg IntraVENous Q8H    diazePAM (VALIUM) tablet 2 mg  2 mg Oral Q6H PRN    promethazine (PHENERGAN) with saline injection 12.5 mg  12.5 mg IntraVENous Q6H PRN    furosemide (LASIX) tablet 80 mg  80 mg Oral DAILY    spironolactone (ALDACTONE) tablet 25 mg  25 mg Oral BID    enoxaparin (LOVENOX) injection 40 mg  40 mg SubCUTAneous Q24H    hydrocortisone (ANUSOL-HC) 2.5 % rectal cream   PeriANAL QID PRN    digoxin (LANOXIN) tablet 0.125 mg  0.125 mg Oral DAILY    dilTIAZem (CARDIZEM) IR tablet 30 mg  30 mg Oral Q6H    NUTRITIONAL SUPPORT ELECTROLYTE PRN ORDERS   Does Not Apply PRN    acetaminophen (TYLENOL) tablet 500 mg  500 mg Oral Q4H PRN    busPIRone (BUSPAR) tablet 5 mg  5 mg Oral BID    morphine injection 2 mg  2 mg IntraVENous Q4H PRN    metoclopramide HCl (REGLAN) injection 5 mg  5 mg IntraVENous Q6H    sodium chloride (NS) flush 5 mL  5 mL InterCATHeter Q8H    sodium chloride (NS) flush 5-10 mL  5-10 mL InterCATHeter PRN    influenza vaccine 2016-17 (36mos+)(PF) (FLUZONE/FLUARIX/FLULAVAL QUAD) injection 0.5 mL  0.5 mL IntraMUSCular PRIOR TO DISCHARGE    insulin regular (NOVOLIN R, HUMULIN R) injection   SubCUTAneous Q6H    pantoprazole (PROTONIX) injection 40 mg  40 mg IntraVENous Q12H    sucralfate (CARAFATE) 100 mg/mL oral suspension 1 g  1 g Oral AC&HS    sodium chloride (NS) flush 5-10 mL  5-10 mL IntraVENous PRN         Objective:     Vitals:    02/14/17 0418 02/14/17 0450 02/14/17 0801 02/14/17 1005   BP: 107/55  120/76 90/52   Pulse: 89  75 74   Resp: 18  22 20   Temp: 98 °F (36.7 °C)  97.6 °F (36.4 °C) 98.2 °F (36.8 °C)   SpO2: 96%  99% 100%   Weight:  128 lb (58.1 kg)     Height:         Intake and Output:   02/12 1901 - 02/14 0700  In: 540 [P.O.:540]  Out: 1600 [Urine:1600]  02/14 0701 - 02/14 1900  In: 100 [P.O.:100]  Out: 950 [Urine:950]    Physical Exam:          GEN: weak and in no acute distress, Oxygen per 12 lpm  HEENT: no alar flaring or epistaxis, oral mucosa moist without cyanosis,   NECK:  no JVD, no retractions, no thyromegaly or masses,   LUNGS:  Clear bilaterally on 12 lpm  HEART:  RRR with no M,G,R;  ABDOMEN:  soft with no tenderness; positive bowel sounds present  EXTREMITIES:  warm with no cyanosis, 1+ lower leg edema  SKIN:  no jaundice or ecchymosis   NEURO:  alert, weak, appropriate    Lines/Drains: IV  Nutrition: GI soft    CHEST XRAY: ordered    LAB    Recent Labs      02/13/17   0645  02/12/17   2030  02/12/17   0625   NA  145  146*  144   K  3.0* 3. 3*  3.3*   CL  105  105  106   CO2  33*  33*  30   GLU  121*  166*  143*   BUN  17  15  13   CREA  0.95  1.02*  0.97   MG  2.3   --   1.9     Echocardiogram  Overview:  Left ventricle: Systolic function was hyperdynamic. Ejection fraction was  estimated in the range of 70 % to 75 %. Septal flattening consistent with   RV pressure and volume overload There were no regional wall motion   abnormalities. PAP est 65 mm hg by TR.       Assessment:     Patient Active Problem List   Diagnosis Code    Pulmonary infiltrate R91.8    Bilateral leg edema R60.0    Esophagitis determined by endoscopy K20.9    Debility R53.81    Pulmonary hypertension ([facility] [address] I27.2    Acute respiratory failure [facility]) J96.00       Plan     Hospital Problems  Date Reviewed: 2/9/2017          Codes Class Noted POA    Acute respiratory failure ([facility] [address] ICD-10-CM: J96.00  ICD-9-CM: 518.81  2/14/2017 Yes    On 12 lpm    Pulmonary hypertension (HCC) (Chronic) ICD-10-CM: I27.2  ICD-9-CM: 416.8  2/12/2017 Yes    Overview Signed 2/12/2017 12:41 PM by Wandy Mendoza MD      Left ventricle: Systolic function was hyperdynamic. Ejection fraction was  estimated in the range of 70 % to 75 %. Septal flattening consistent with RV  pressure and volume overload There were no regional wall motion   abnormalities. PAP est 65mmhg by TR     -  Right ventricle: The ventricle was markedly dilated. Systolic function was  reduced. Debility (Chronic) ICD-10-CM: R53.81  ICD-9-CM: 799.3  2/9/2017 Yes    Limited     Esophagitis determined by endoscopy ICD-10-CM: K20.9  ICD-9-CM: 530.10  2/7/2017 Yes    GI has seen     Bilateral leg edema ICD-10-CM: R60.0  ICD-9-CM: 782.3  2/6/2017 Yes    improved    Pulmonary infiltrate ICD-10-CM: R91.8  ICD-9-CM: 793.19  2/5/2017 Yes    cxr today        -- assess O2 needs  -- HR controlled. No plans for RHC- chronic and poor candidate.    -- Cardiology following- lasix 80 mg daily  --PC meeting with the family today at 4 pm --PPUI, carafate, zofran, phenergan and zofran around the clock. Ask GI to see again for possible suggestions. --symptom management for N/V; this is her primary complaint  --D 9 Abx- claire Chow NP    More than 50% of time documented was spent in face-to-face contact with the patient and in the care of the patient on the floor/unit where the patient is located. Lungs:  Decreased BS with a few scattered rhonchi  Heart:  RRR with no Murmur/Rubs/Gallops    Additional Comments:  Remains very nauseated. Vomited last night. Check CXR to assess for change. Had been diuresing. PC meeting with family today. I have spoken with and examined the patient. I agree with the above assessment and plan as documented.     Ranjith Moore MD

## 2017-02-14 NOTE — PROGRESS NOTES
Patient in bed resting with no complaints at this time. Patient is alert and orientated with no distress noted. Line intact and patent with no s/s of infection noted. Respirations even and unlabored with heart rate regular. Patient unable to ambulate without assistance; bedrest at this time. Bed in low locked position with call light within reach. Patient instructed to call if assistance is needed. Will continue to monitor.

## 2017-02-15 ENCOUNTER — HOSPICE ADMISSION (OUTPATIENT)
Dept: HOSPICE | Facility: HOSPICE | Age: 82
End: 2017-02-15

## 2017-02-15 LAB
ANION GAP BLD CALC-SCNC: 8 MMOL/L (ref 7–16)
BUN SERPL-MCNC: 14 MG/DL (ref 8–23)
CALCIUM SERPL-MCNC: 7.9 MG/DL (ref 8.3–10.4)
CHLORIDE SERPL-SCNC: 99 MMOL/L (ref 98–107)
CO2 SERPL-SCNC: 36 MMOL/L (ref 21–32)
CREAT SERPL-MCNC: 0.91 MG/DL (ref 0.6–1)
GLUCOSE BLD STRIP.AUTO-MCNC: 121 MG/DL (ref 65–100)
GLUCOSE BLD STRIP.AUTO-MCNC: 128 MG/DL (ref 65–100)
GLUCOSE BLD STRIP.AUTO-MCNC: 130 MG/DL (ref 65–100)
GLUCOSE BLD STRIP.AUTO-MCNC: 181 MG/DL (ref 65–100)
GLUCOSE SERPL-MCNC: 161 MG/DL (ref 65–100)
MAGNESIUM SERPL-MCNC: 1.9 MG/DL (ref 1.8–2.4)
POTASSIUM SERPL-SCNC: 3.1 MMOL/L (ref 3.5–5.1)
SODIUM SERPL-SCNC: 143 MMOL/L (ref 136–145)

## 2017-02-15 PROCEDURE — 74011250636 HC RX REV CODE- 250/636: Performed by: NURSE PRACTITIONER

## 2017-02-15 PROCEDURE — 74011636637 HC RX REV CODE- 636/637: Performed by: INTERNAL MEDICINE

## 2017-02-15 PROCEDURE — 82962 GLUCOSE BLOOD TEST: CPT

## 2017-02-15 PROCEDURE — 74011250637 HC RX REV CODE- 250/637: Performed by: INTERNAL MEDICINE

## 2017-02-15 PROCEDURE — 74011250636 HC RX REV CODE- 250/636: Performed by: INTERNAL MEDICINE

## 2017-02-15 PROCEDURE — 99231 SBSQ HOSP IP/OBS SF/LOW 25: CPT | Performed by: INTERNAL MEDICINE

## 2017-02-15 PROCEDURE — 83735 ASSAY OF MAGNESIUM: CPT | Performed by: NURSE PRACTITIONER

## 2017-02-15 PROCEDURE — 80048 BASIC METABOLIC PNL TOTAL CA: CPT | Performed by: NURSE PRACTITIONER

## 2017-02-15 PROCEDURE — 94760 N-INVAS EAR/PLS OXIMETRY 1: CPT

## 2017-02-15 PROCEDURE — 74011250637 HC RX REV CODE- 250/637: Performed by: NURSE PRACTITIONER

## 2017-02-15 PROCEDURE — 65270000029 HC RM PRIVATE

## 2017-02-15 PROCEDURE — 74011000250 HC RX REV CODE- 250: Performed by: INTERNAL MEDICINE

## 2017-02-15 PROCEDURE — C9113 INJ PANTOPRAZOLE SODIUM, VIA: HCPCS | Performed by: NURSE PRACTITIONER

## 2017-02-15 PROCEDURE — 97530 THERAPEUTIC ACTIVITIES: CPT

## 2017-02-15 RX ORDER — DIAZEPAM 2 MG/1
2 TABLET ORAL
Status: DISCONTINUED | OUTPATIENT
Start: 2017-02-15 | End: 2017-02-15

## 2017-02-15 RX ORDER — POTASSIUM CHLORIDE 20 MEQ/1
40 TABLET, EXTENDED RELEASE ORAL
Status: COMPLETED | OUTPATIENT
Start: 2017-02-15 | End: 2017-02-15

## 2017-02-15 RX ORDER — ONDANSETRON 8 MG/1
8 TABLET, ORALLY DISINTEGRATING ORAL EVERY 8 HOURS
Status: DISCONTINUED | OUTPATIENT
Start: 2017-02-15 | End: 2017-02-17 | Stop reason: HOSPADM

## 2017-02-15 RX ORDER — DIAZEPAM 2 MG/1
2 TABLET ORAL 2 TIMES DAILY
Status: DISCONTINUED | OUTPATIENT
Start: 2017-02-15 | End: 2017-02-17 | Stop reason: HOSPADM

## 2017-02-15 RX ADMIN — DICYCLOMINE HYDROCHLORIDE 10 MG: 10 CAPSULE ORAL at 15:37

## 2017-02-15 RX ADMIN — SODIUM CHLORIDE 12.5 MG: 9 INJECTION INTRAMUSCULAR; INTRAVENOUS; SUBCUTANEOUS at 10:04

## 2017-02-15 RX ADMIN — SPIRONOLACTONE 25 MG: 25 TABLET ORAL at 10:03

## 2017-02-15 RX ADMIN — DIAZEPAM 2 MG: 2 TABLET ORAL at 10:03

## 2017-02-15 RX ADMIN — HYDROCODONE BITARTRATE AND HOMATROPINE METHYLBROMIDE 5 ML: 5; 1.5 SOLUTION ORAL at 15:37

## 2017-02-15 RX ADMIN — ONDANSETRON 4 MG: 2 INJECTION INTRAMUSCULAR; INTRAVENOUS at 06:05

## 2017-02-15 RX ADMIN — DIGOXIN 0.12 MG: 0.12 TABLET ORAL at 10:03

## 2017-02-15 RX ADMIN — PANTOPRAZOLE SODIUM 40 MG: 40 INJECTION, POWDER, FOR SOLUTION INTRAVENOUS at 20:09

## 2017-02-15 RX ADMIN — ONDANSETRON 8 MG: 8 TABLET, ORALLY DISINTEGRATING ORAL at 22:33

## 2017-02-15 RX ADMIN — DICYCLOMINE HYDROCHLORIDE 10 MG: 10 CAPSULE ORAL at 06:05

## 2017-02-15 RX ADMIN — SUCRALFATE 1 G: 1 SUSPENSION ORAL at 22:33

## 2017-02-15 RX ADMIN — SODIUM CHLORIDE 12.5 MG: 9 INJECTION INTRAMUSCULAR; INTRAVENOUS; SUBCUTANEOUS at 00:51

## 2017-02-15 RX ADMIN — ONDANSETRON 8 MG: 8 TABLET, ORALLY DISINTEGRATING ORAL at 15:37

## 2017-02-15 RX ADMIN — SUCRALFATE 1 G: 1 SUSPENSION ORAL at 06:06

## 2017-02-15 RX ADMIN — DILTIAZEM HYDROCHLORIDE 30 MG: 30 TABLET, FILM COATED ORAL at 10:03

## 2017-02-15 RX ADMIN — Medication 5 ML: at 15:43

## 2017-02-15 RX ADMIN — SUCRALFATE 1 G: 1 SUSPENSION ORAL at 12:44

## 2017-02-15 RX ADMIN — PANTOPRAZOLE SODIUM 40 MG: 40 INJECTION, POWDER, FOR SOLUTION INTRAVENOUS at 10:04

## 2017-02-15 RX ADMIN — POTASSIUM CHLORIDE 40 MEQ: 20 TABLET, EXTENDED RELEASE ORAL at 03:54

## 2017-02-15 RX ADMIN — Medication 5 ML: at 06:06

## 2017-02-15 RX ADMIN — DILTIAZEM HYDROCHLORIDE 30 MG: 30 TABLET, FILM COATED ORAL at 20:09

## 2017-02-15 RX ADMIN — FUROSEMIDE 80 MG: 40 TABLET ORAL at 10:03

## 2017-02-15 RX ADMIN — Medication 5 ML: at 22:34

## 2017-02-15 RX ADMIN — DILTIAZEM HYDROCHLORIDE 30 MG: 30 TABLET, FILM COATED ORAL at 02:27

## 2017-02-15 RX ADMIN — SUCRALFATE 1 G: 1 SUSPENSION ORAL at 17:16

## 2017-02-15 RX ADMIN — HUMAN INSULIN 2 UNITS: 100 INJECTION, SOLUTION SUBCUTANEOUS at 17:25

## 2017-02-15 RX ADMIN — ENOXAPARIN SODIUM 40 MG: 40 INJECTION SUBCUTANEOUS at 06:05

## 2017-02-15 RX ADMIN — CARBIDOPA AND LEVODOPA 5 MG: 25; 100 TABLET, EXTENDED RELEASE ORAL at 10:03

## 2017-02-15 NOTE — PROGRESS NOTES
Daughter reports pt's inability to drink potassium chloride d/t N/V. Dr. Grey Mcmahan notified. Orders received to administer potassium chloride pill. Will monitor.

## 2017-02-15 NOTE — PROGRESS NOTES
OT note:  Pt recently returned to bed soundly sleeping with family at bedside. Will re-attempt at a later date/time.   Maddi Andujar

## 2017-02-15 NOTE — PROGRESS NOTES
Uneventful night. Pt resting comfortably in bed, no distress noted. Bedside report given to oncoming 7a-7p nurse.

## 2017-02-15 NOTE — PROGRESS NOTES
Problem: Mobility Impaired (Adult and Pediatric)  Goal: *Acute Goals and Plan of Care (Insert Text)  STG:  (1.)Ms. Doug Bowles will move from supine to sit and sit to supine , scoot up and down and roll side to side in bed with MAXIMAL ASSIST within 7 day(s). (2.)Ms. Doug Bowles will transfer from bed to chair and chair to bed with MAXIMAL ASSIST using the least restrictive device within 7 day(s). (3.)Ms. Duog Bowles will maintain seated balance and upright posture with SUPERVISION and no external support within 7 days. LTG:  (1.)Ms. Doug Bowles will move from supine to sit and sit to supine , scoot up and down and roll side to side in bed with MODERATE ASSIST within 14 day(s). (2.)Ms. Doug Bowles will transfer from bed to chair and chair to bed with MODERATE ASSIST using the least restrictive device within 14 day(s). (3.)Ms. Doug Bowles will ambulate with MODERATE ASSIST for 10+ feet with the least restrictive device within 14 day(s).     ________________________________________________________________________________________________      PHYSICAL THERAPY: Daily Note, Treatment Day: 2nd and AM 2/15/2017  INPATIENT: Hospital Day: 11  [place] / Product Type: Medicare /      NAME/AGE/GENDER: Heriberto Rosa is a 80 y.o. female              PRIMARY DIAGNOSIS: Nausea and vomiting, intractability of vomiting not specified, unspecified vomiting type [R11.2]  Rectal mass [K62.9] Septic shock (HCC) Septic shock (HCC)  Procedure(s) (LRB):  ESOPHAGOGASTRODUODENOSCOPY (EGD) At bedside (N/A)  SIGMOIDOSCOPY FLEXIBLE at bedside (N/A)  ESOPHAGOGASTRODUODENAL (EGD) BIOPSY (N/A)  8 Days Post-Op  ICD-10: Treatment Diagnosis:       · Generalized Muscle Weakness (M62.81)  · Difficulty in walking, Not elsewhere classified (R26.2)  · Abnormal posture (R29.3)   Precaution/Allergies:  Aspirin and Pcn [penicillins]       ASSESSMENT:      Ms. Doug Bowles presents supine in bed, agreeable to therapy with family member in room.  She reports being nauseous; however, is agreeable to attempting stand and going to chair. She required moderate assistance to sit EOB at both trunk and LEs. Overall fair sitting balance sitting EOB. She stood with moderate assistance x 2 (MUCH BETTER PROGRESS FROM LAST SESSION) and shuffled with PT and Rehab attendant from EOB about 1-2 feet to chair. She remained in chair at end of session and performed seated activities (LAQs, HRs, Marching seated) 10x2. Progress per POC. She is weak, but will work with strong encouragement/reinforcement. This section established at most recent assessment   PROBLEM LIST (Impairments causing functional limitations):  1. Decreased Strength  2. Decreased Transfer Abilities  3. Decreased Ambulation Ability/Technique  4. Decreased Balance  5. Decreased Activity Tolerance    INTERVENTIONS PLANNED: (Benefits and precautions of physical therapy have been discussed with the patient.)  1. Balance Exercise  2. Bed Mobility  3. Family Education  4. Gait Training  5. Therapeutic Activites  6. Therapeutic Exercise/Strengthening  7. Group Therapy      TREATMENT PLAN: Frequency/Duration: 5 times a week for duration of hospital stay  Rehabilitation Potential For Stated Goals: FAIR      RECOMMENDED REHABILITATION/EQUIPMENT: (at time of discharge pending progress): Continue Skilled Therapy and Rehab. HISTORY:   History of Present Injury/Illness (Reason for Referral):  Per H&P, \"82yoF with a PMH of HTN, syncope, mild dementia who was recently hospitalized at [facility] after a fall and was discharged to rehab. Presents to ER from her rehab with hypotension and given IVFs but required Levophed. Had a recent UTI with E. Coli and treated with antibiotics. She has had persistent cough since December. Creatinine was elevated 2.12 and CXR is notable for possible retrocardiac infiltrate.  GI consulted for N/V and EGD with severe erosive esophagitis, biopsy at EG junction, large hiatal hernia and mild gastroduodenitis. Flex sig with large hemorrhoids. Weaned off Levophed, diet advanced to clear liquids and moved to the floor. Placed on Opti-flow for shortness of breath. \"  Past Medical History/Comorbidities:   Ms. William Gonzalez  has a past medical history of Arrhythmia; CAD (coronary artery disease); Chronic pain; Gastrointestinal disorder; Gastrointestinal disorder; and GERD (gastroesophageal reflux disease). Ms. William Gonzalez  has a past surgical history that includes hysterectomy; breast surgery procedure unlisted; and flexible sigmoidoscopy (N/A, 2/7/2017). Social History/Living Environment:   Home Environment: Private residence  # Steps to Enter: 0  Wheelchair Ramp: Yes  One/Two Story Residence: One story  Living Alone: No  Support Systems: Family member(s)  Patient Expects to be Discharged to[de-identified] Rehabilitation facility  Current DME Used/Available at Home: Walker, rolling  Tub or Shower Type: Shower  Prior Level of Function/Work/Activity:  Ms. William Gonzalez required assistance for all bathing and dressing and was using a rolling walker for ambulation. Daughter states that pt also has R knee pain that limits her mobility and was receiving injections for pain. She was incontinent of B/B due to her limited mobility. Number of Personal Factors/Comorbidities that affect the Plan of Care:  Low PLOF  R knee pain  Supplemental O2 3+: HIGH COMPLEXITY   EXAMINATION:   Most Recent Physical Functioning:   Gross Assessment:  AROM: Generally decreased, functional  PROM: Generally decreased, functional  Strength: Generally decreased, functional  Coordination: Generally decreased, functional  Tone: Normal  Sensation: Intact               Posture:  Posture (WDL): Exceptions to WDL  Posture Assessment:  Forward head  Balance:  Sitting: Impaired  Sitting - Static: Good (unsupported)  Sitting - Dynamic: Fair (occasional)  Standing: Impaired  Standing - Static: Poor  Standing - Dynamic : Poor Bed Mobility:  Rolling: Moderate assistance  Supine to Sit: Moderate assistance  Scooting: Moderate assistance  Wheelchair Mobility:     Transfers: N/A  Sit to Stand: Moderate assistance;Assist x2  Stand to Sit: Moderate assistance;Assist x2  Bed to Chair: Moderate assistance (x2)  Gait: N/A     Base of Support: Narrowed  Speed/Falguni: Shuffled  Step Length: Right shortened;Left shortened  Gait Abnormalities: Decreased step clearance  Distance (ft): 1 Feet (ft)  Ambulation - Level of Assistance: Moderate assistance;Assist x2       Body Structures Involved:  1. Muscles Body Functions Affected:  1. Respiratory  2. Neuromusculoskeletal  3. Movement Related Activities and Participation Affected:  1. General Tasks and Demands  2. Mobility  3. Self Care   Number of elements that affect the Plan of Care: 4+: HIGH COMPLEXITY   CLINICAL PRESENTATION:   Presentation: Evolving clinical presentation with changing clinical characteristics: MODERATE COMPLEXITY   CLINICAL DECISION MAKIN [facility] Mobility Inpatient Short Form  How much difficulty does the patient currently have. .. Unable A Lot A Little None   1. Turning over in bed (including adjusting bedclothes, sheets and blankets)? [ ] 1   [X] 2   [ ] 3   [ ] 4   2. Sitting down on and standing up from a chair with arms ( e.g., wheelchair, bedside commode, etc.)   [X] 1   [ ] 2   [ ] 3   [ ] 4   3. Moving from lying on back to sitting on the side of the bed? [ ] 1   [X] 2   [ ] 3   [ ] 4   How much help from another person does the patient currently need. .. Total A Lot A Little None   4. Moving to and from a bed to a chair (including a wheelchair)? [X] 1   [ ] 2   [ ] 3   [ ] 4   5. Need to walk in hospital room? [X] 1   [ ] 2   [ ] 3   [ ] 4   6. Climbing 3-5 steps with a railing? [X] 1   [ ] 2   [ ] 3   [ ] 4   © , Trustees of [address] [place] [address] [address] [address], under license to [employer].  All rights reserved    Score:  Initial: 8 Most Recent: X (Date: 2/9/17 )     Interpretation of Tool:  Represents activities that are increasingly more difficult (i.e. Bed mobility, Transfers, Gait). Score 24 23 22-20 19-15 14-10 9-7 6       Modifier CH CI CJ CK CL CM CN         · Mobility - Walking and Moving Around:               - CURRENT STATUS:    CM - 80%-99% impaired, limited or restricted               - GOAL STATUS:           CL - 60%-79% impaired, limited or restricted               - D/C STATUS:                       ---------------To be determined---------------  [place] / Product Type: Medicare /       Medical Necessity:     · Patient is expected to demonstrate progress in strength, range of motion, balance and functional technique to decrease assistance required with bed mobility and transfers and improve safety during functional activity. Reason for Services/Other Comments:  · Patient continues to require present interventions due to patient's inability to tolerate and maintain upright sitting and complete functional mobility safely without assistance. .   Use of outcome tool(s) and clinical judgement create a POC that gives a: Questionable prediction of patient's progress: MODERATE COMPLEXITY                 TREATMENT:   (In addition to Assessment/Re-Assessment sessions the following treatments were rendered)   Pre-treatment Symptoms/Complaints:  \"can you just help me get to that chair\"  Pain: Initial:   Pain Intensity 1: 0  Post Session:  0/10      Therapeutic Activity: (    24 minutes): Therapeutic activities including Bed transfers, sitting balance on the EOB with constant cueing to help maintain some balance and attempt to stand, ambulation on level floor with walker with moderate assist x2 to improve mobility, strength and balance. Required moderate   to promote static and dynamic balance in sitting. Seated activities as seen above.     Therapeutic Exercise: ( ):  Exercises per grid below to improve mobility, strength and coordination. Required moderate verbal and manual cues to promote proper body mechanics. Progressed complexity of movement as indicated. Date:  2/13/17 Date:   Date:     Activity/Exercise Parameters Parameters Parameters   Supine AP 10x B     Supine heel slides 10x B     Seated forward reach 5x                                    Braces/Orthotics/Lines/Etc:   · IV and white catheter  Treatment/Session Assessment:    · Response to Treatment:  Pt was able to tolerate unsupported upright sitting for only a very short period of time before loosing her balnce  · Interdisciplinary Collaboration:  · Physical Therapist, Registered Nurse and Rehabilitation Attendant  · After treatment position/precautions:  · Up in chair, Bed/Chair-wheels locked, Bed in low position, Call light within reach and Family at bedside  · Compliance with Program/Exercises: Will assess as treatment progresses. · Recommendations/Intent for next treatment session: \"Next visit will focus on advancements to more challenging activities and reduction in assistance provided\".   Total Treatment Duration:  PT Patient Time In/Time Out  Time In: 1020  Time Out: [address] [facility] Stefano Mccabe DPT SPT

## 2017-02-15 NOTE — PROGRESS NOTES
[facility] CARDIOLOGY PROGRESS NOTE           2/15/2017 10:28 AM    Admit Date: 2/5/2017      Subjective:   Patient states she is not feeling well.   Daughter in room with her    ROS:  Cardiovascular:  As noted above    Objective:      Vitals:    02/15/17 0226 02/15/17 0350 02/15/17 0600 02/15/17 0743   BP: 102/59 98/60  108/64   Pulse: 86 88  84   Resp:  18  17   Temp:  98.7 °F (37.1 °C)  98.4 °F (36.9 °C)   SpO2:  94%  96%   Weight:   132 lb 8 oz (60.1 kg)    Height:         Current Facility-Administered Medications   Medication Dose Route Frequency    diazePAM (VALIUM) tablet 2 mg  2 mg Oral BID    lip protectant (BLISTEX) ointment   Topical PRN    ondansetron (ZOFRAN) injection 4 mg  4 mg IntraVENous Q8H    dicyclomine (BENTYL) capsule 10 mg  10 mg Oral ACB&D    HYDROcodone-homatropine (HYCODAN) 5-1.5 mg/5 mL (5 mL) syrup 5 mL  5 mL Oral Q4H PRN    promethazine (PHENERGAN) with saline injection 12.5 mg  12.5 mg IntraVENous Q6H PRN    furosemide (LASIX) tablet 80 mg  80 mg Oral DAILY    spironolactone (ALDACTONE) tablet 25 mg  25 mg Oral BID    enoxaparin (LOVENOX) injection 40 mg  40 mg SubCUTAneous Q24H    hydrocortisone (ANUSOL-HC) 2.5 % rectal cream   PeriANAL QID PRN    digoxin (LANOXIN) tablet 0.125 mg  0.125 mg Oral DAILY    dilTIAZem (CARDIZEM) IR tablet 30 mg  30 mg Oral Q6H    NUTRITIONAL SUPPORT ELECTROLYTE PRN ORDERS   Does Not Apply PRN    acetaminophen (TYLENOL) tablet 500 mg  500 mg Oral Q4H PRN    busPIRone (BUSPAR) tablet 5 mg  5 mg Oral BID    morphine injection 2 mg  2 mg IntraVENous Q4H PRN    sodium chloride (NS) flush 5 mL  5 mL InterCATHeter Q8H    sodium chloride (NS) flush 5-10 mL  5-10 mL InterCATHeter PRN    influenza vaccine 2016-17 (36mos+)(PF) (FLUZONE/FLUARIX/FLULAVAL QUAD) injection 0.5 mL  0.5 mL IntraMUSCular PRIOR TO DISCHARGE    insulin regular (NOVOLIN R, HUMULIN R) injection   SubCUTAneous Q6H    pantoprazole (PROTONIX) injection 40 mg  40 mg IntraVENous Q12H    sucralfate (CARAFATE) 100 mg/mL oral suspension 1 g  1 g Oral AC&HS    sodium chloride (NS) flush 5-10 mL  5-10 mL IntraVENous PRN         Physical Exam   Constitutional: No distress. HENT:   Head: Normocephalic. Neck: Normal range of motion. Cardiovascular:   Murmur heard. Systolic murmur is present with a grade of 2/6   Abdominal: Soft. Musculoskeletal: She exhibits edema. Skin: Skin is warm. Data Review:   Recent Labs      02/15/17   0800  02/14/17   1740  02/13/17   0645   NA  143  142  145   K  3.1*  2.5*  3.0*   MG  1.9   --   2.3   BUN  14 12 17   CREA  0.91  1.05*  0.95   GLU  161*  218*  121*   WBC   --   8.2   --    HGB   --   11.3*   --    HCT   --   36.3   --    PLT   --   344   --        Assessment/Plan:     Active Problems:    Pulmonary infiltrate (2/5/2017)      Bilateral leg edema (2/6/2017)      Esophagitis determined by endoscopy (2/7/2017)      Debility (2/9/2017)      Atrial fibrillation with RVR ([address] (2/10/2017)      Pulmonary hypertension ([address] (2/12/2017)      Overview:  Left ventricle: Systolic function was hyperdynamic. Ejection fraction was      estimated in the range of 70 % to 75 %. Septal flattening consistent with       RV      pressure and volume overload There were no regional wall motion       abnormalities. PAP est 65mmhg by TR     Discussed case with patient and daughter. It appears at this time palliative care is involved and patient is seeking information regarding hospice care.   As stated before invasive evaluation not desired by patient or family    Will sign off please call with questions    Abhinav Cuellar MD  2/15/2017 10:28 AM

## 2017-02-15 NOTE — PROGRESS NOTES
Reassessment completed. Pt sleeping comfortably in bed with family at bedside. Respirations even and unlabored. Will continue to monitor.

## 2017-02-15 NOTE — PROGRESS NOTES
Sal Hernandez  Admission Date: 2/5/2017             Daily Progress Note: 2/15/2017     82yoF with one month of nausea/vomiting/inability to eat, history of UTIs, recent fall who is admitted with septic shock of unclear origin. Chest Xray suggestive of retrocardiac density which appears minor on abdominal CT. Rectal wall thickening on CT. On pressors but weaned to off. EGD reveals moderately severe erosive esophagitis, a polyp at the EG junction that was biopsied, a large hiatal hernia and mild gastroduodenitis. Hemorrhoids seen with flexsig. V/A stable. On carafate and PPI. GI signed off. Tolerating diet. Developed atrial fibrillation with RVR 2/10. Cardiology consulted. Overall doing poorly and family decided to make patient DNR on 2/10.  She will be very preload sensitive regarding her forward cardiac output. . Cardiology recommends to be cautious with diuretics. Pulmonary htn appears advanced and chronic by echo. She does not appear to be a candidate for RHC and phosphodiesterase inhibition. Patient on Lasix 80 mg daily. Aggressive rate control and negative inotropic agents should be avoided.       Nausea and limited oral intake is still a primary problem and GI asked to see to improve symptoms. PC met with family and discussing hospice. Subjective:     Gi saw her and meds adjusted  Ongoing nausea and minimal oral intake. DIL at bedside. Discussed hospice and interested in presentation. Plans to discuss with family.      Review of Systems  Constitutional: positive for fatigue  Respiratory: positive for dyspnea on exertion  Cardiovascular: positive for lower extremity edema    Current Facility-Administered Medications   Medication Dose Route Frequency    lip protectant (BLISTEX) ointment   Topical PRN    ondansetron (ZOFRAN) injection 4 mg  4 mg IntraVENous Q8H    dicyclomine (BENTYL) capsule 10 mg  10 mg Oral ACB&D    diazePAM (VALIUM) tablet 2 mg  2 mg Oral BID    HYDROcodone-homatropine (HYCODAN) 5-1.5 mg/5 mL (5 mL) syrup 5 mL  5 mL Oral Q4H PRN    promethazine (PHENERGAN) with saline injection 12.5 mg  12.5 mg IntraVENous Q6H PRN    furosemide (LASIX) tablet 80 mg  80 mg Oral DAILY    spironolactone (ALDACTONE) tablet 25 mg  25 mg Oral BID    enoxaparin (LOVENOX) injection 40 mg  40 mg SubCUTAneous Q24H    hydrocortisone (ANUSOL-HC) 2.5 % rectal cream   PeriANAL QID PRN    digoxin (LANOXIN) tablet 0.125 mg  0.125 mg Oral DAILY    dilTIAZem (CARDIZEM) IR tablet 30 mg  30 mg Oral Q6H    NUTRITIONAL SUPPORT ELECTROLYTE PRN ORDERS   Does Not Apply PRN    acetaminophen (TYLENOL) tablet 500 mg  500 mg Oral Q4H PRN    busPIRone (BUSPAR) tablet 5 mg  5 mg Oral BID    morphine injection 2 mg  2 mg IntraVENous Q4H PRN    sodium chloride (NS) flush 5 mL  5 mL InterCATHeter Q8H    sodium chloride (NS) flush 5-10 mL  5-10 mL InterCATHeter PRN    influenza vaccine 2016-17 (36mos+)(PF) (FLUZONE/FLUARIX/FLULAVAL QUAD) injection 0.5 mL  0.5 mL IntraMUSCular PRIOR TO DISCHARGE    insulin regular (NOVOLIN R, HUMULIN R) injection   SubCUTAneous Q6H    pantoprazole (PROTONIX) injection 40 mg  40 mg IntraVENous Q12H    sucralfate (CARAFATE) 100 mg/mL oral suspension 1 g  1 g Oral AC&HS    sodium chloride (NS) flush 5-10 mL  5-10 mL IntraVENous PRN         Objective:     Vitals:    02/14/17 2330 02/15/17 0226 02/15/17 0350 02/15/17 0600   BP: 97/60 102/59 98/60    Pulse: 93 86 88    Resp: 20  18    Temp: 97.7 °F (36.5 °C)  98.7 °F (37.1 °C)    SpO2: 91%  94%    Weight:    132 lb 8 oz (60.1 kg)   Height:         Intake and Output:   02/13 1901 - 02/15 0700  In: 220 [P.O.:220]  Out: 2200 [Urine:2200]       Physical Exam:          GEN: elderly, debilitated   HEENT: no alar flaring or epistaxis, oral mucosa moist without cyanosis,   NECK:  no JVD, no retractions, no thyromegaly or masses,   LUNGS:  Clear bilaterally  HEART:  IRR with no M,G,R;  ABDOMEN:  soft with mild tenderness; positive bowel sounds present  EXTREMITIES:  warm with no cyanosis, 1+ lower leg edema  SKIN:  no jaundice or ecchymosis   NEURO:  alert and oriented     Lines/Drains: IV  Nutrition: GI soft    CHEST XRAY:  IMPRESSION: Retrocardiac atelectasis or consolidation. LAB  Recent Labs      02/14/17   1740   WBC  8.2   HGB  11.3*   HCT  36.3   PLT  344     Recent Labs      02/14/17   1740  02/13/17   0645  02/12/17   2030   NA  142  145  146*   K  2.5*  3.0*  3.3*   CL  98  105  105   CO2  35*  33*  33*   GLU  218*  121*  166*   BUN  12  17  15   CREA  1.05*  0.95  1.02*   MG   --   2.3   --    BNPP  601   --    --          Assessment:     Patient Active Problem List   Diagnosis Code    Pulmonary infiltrate R91.8    Bilateral leg edema R60.0    Esophagitis determined by endoscopy K20.9    Debility R53.81    Pulmonary hypertension (HCC) I27.2    Acute respiratory failure ([facility] [address] J96.00       Plan     Hospital Problems  Date Reviewed: 2/9/2017          Codes Class Noted POA    Acute respiratory failure ([facility] [address] ICD-10-CM: J96.00  ICD-9-CM: 518.81  2/14/2017 Yes    Acute on 2 lpm    Pulmonary hypertension (HCC) (Chronic) ICD-10-CM: I27.2  ICD-9-CM: 416.8  2/12/2017 Yes    Overview Signed 2/12/2017 12:41 PM by Dillon Davenport MD      Left ventricle: Systolic function was hyperdynamic. Ejection fraction was  estimated in the range of 70 % to 75 %. Septal flattening consistent with RV  pressure and volume overload There were no regional wall motion   abnormalities. PAP est 65mmhg by TR     -  Right ventricle: The ventricle was markedly dilated. Systolic function was  reduced.              Debility (Chronic) ICD-10-CM: R53.81  ICD-9-CM: 799.3  2/9/2017 Yes    Chronic     Esophagitis determined by endoscopy ICD-10-CM: K20.9  ICD-9-CM: 530.10  2/7/2017 Yes    PPI, carafate    Bilateral leg edema ICD-10-CM: R60.0  ICD-9-CM: 782.3  2/6/2017 Yes    ++ diuresis    Pulmonary infiltrate ICD-10-CM: R91.8  ICD-9-CM: 793.19  2/5/2017 Yes    improved        -- appreciate GI help with ongoing nausea and limited oral intake  --from pulmonary and cardiac standpoint, she seems to be near baseline.  --debilitated, atelectasis. On 2 lpm and doing well  --replete electrolytes. Consider decreasing lasix with limited oral intake  --hospice consult for presentation once family here to meet  -- work towards discharge home with hospice possibly in am    Enrrique Kapoor NP    More than 50% of time documented was spent in face-to-face contact with the patient and in the care of the patient on the floor/unit where the patient is located. Lungs:  Decreased BS   Heart:  RRR with no Murmur/Rubs/Gallops    Additional Comments:  Hospice unable to come today to meet family. Plan meeting tomorrow. I have spoken with and examined the patient. I agree with the above assessment and plan as documented.     Edmond Bonner MD

## 2017-02-15 NOTE — PROGRESS NOTES
GI DAILY PROGRESS NOTE    Admit Date:  2/5/2017    Today's Date:  2/15/2017    CC:  Nausea/vomiting    Subjective:     Patient reports that she has some mild stomach discomfort, but no pain. She reports that scheduled anti-emetics have helped. She ate a bowl of peaches this am with no nausea/vomiting. Family at bedside reports she also drank apple juice, drank some coffee, and drank a small portion of Ensure. Family reports this is the most she has eaten in 5 weeks. Patient denies any RB or melena. Reports last BM was 2 days ago. She denies chest pain and SOB. She is currently on O2 3.5L/min via NC.      Medications:   Current Facility-Administered Medications   Medication Dose Route Frequency    diazePAM (VALIUM) tablet 2 mg  2 mg Oral BID    lip protectant (BLISTEX) ointment   Topical PRN    ondansetron (ZOFRAN) injection 4 mg  4 mg IntraVENous Q8H    dicyclomine (BENTYL) capsule 10 mg  10 mg Oral ACB&D    HYDROcodone-homatropine (HYCODAN) 5-1.5 mg/5 mL (5 mL) syrup 5 mL  5 mL Oral Q4H PRN    promethazine (PHENERGAN) with saline injection 12.5 mg  12.5 mg IntraVENous Q6H PRN    furosemide (LASIX) tablet 80 mg  80 mg Oral DAILY    spironolactone (ALDACTONE) tablet 25 mg  25 mg Oral BID    enoxaparin (LOVENOX) injection 40 mg  40 mg SubCUTAneous Q24H    hydrocortisone (ANUSOL-HC) 2.5 % rectal cream   PeriANAL QID PRN    digoxin (LANOXIN) tablet 0.125 mg  0.125 mg Oral DAILY    dilTIAZem (CARDIZEM) IR tablet 30 mg  30 mg Oral Q6H    NUTRITIONAL SUPPORT ELECTROLYTE PRN ORDERS   Does Not Apply PRN    acetaminophen (TYLENOL) tablet 500 mg  500 mg Oral Q4H PRN    busPIRone (BUSPAR) tablet 5 mg  5 mg Oral BID    morphine injection 2 mg  2 mg IntraVENous Q4H PRN    sodium chloride (NS) flush 5 mL  5 mL InterCATHeter Q8H    sodium chloride (NS) flush 5-10 mL  5-10 mL InterCATHeter PRN    influenza vaccine 2016-17 (36mos+)(PF) (FLUZONE/FLUARIX/FLULAVAL QUAD) injection 0.5 mL  0.5 mL IntraMUSCular PRIOR TO DISCHARGE    insulin regular (NOVOLIN R, HUMULIN R) injection   SubCUTAneous Q6H    pantoprazole (PROTONIX) injection 40 mg  40 mg IntraVENous Q12H    sucralfate (CARAFATE) 100 mg/mL oral suspension 1 g  1 g Oral AC&HS    sodium chloride (NS) flush 5-10 mL  5-10 mL IntraVENous PRN       Review of Systems:  ROS was obtained, with pertinent positives as listed above. No chest pain or SOB. Diet:  GI soft with Ensure at all meals    Objective:   Vitals:  Visit Vitals    /64 (BP 1 Location: Right arm, BP Patient Position: At rest)    Pulse 84    Temp 98.4 °F (36.9 °C)    Resp 17    Ht 5' 3\" (1.6 m)    Wt 60.1 kg (132 lb 8 oz)    SpO2 96%    Breastfeeding No    BMI 23.47 kg/m2     Intake/Output:     02/13 1901 - 02/15 0700  In: 220 [P.O.:220]  Out: 2200 [Urine:2200]  Exam:  General appearance: alert, cooperative, no distress  Lungs: clear to auscultation bilaterally anteriorly  Heart: regular rate and rhythm. No MGCR  Abdomen: soft, non-tender. Bowel sounds normal. No masses, no organomegaly  Extremities: extremities normal, atraumatic or cyanosis.  Mild non pitting edema LE bilaterally  Neuro:  alert and oriented    Data Review (Labs):    Recent Labs      02/14/17   1740  02/13/17   0645  02/12/17   2030   WBC  8.2   --    --    HGB  11.3*   --    --    HCT  36.3   --    --    PLT  344   --    --    MCV  97.8   --    --    NA  142  145  146*   K  2.5*  3.0*  3.3*   CL  98  105  105   CO2  35*  33*  33*   BUN  12  17  15   CREA  1.05*  0.95  1.02*   CA  7.6*  7.7*  7.7*   MG   --   2.3   --    GLU  218*  121*  166*   AP  58   --    --    SGOT  10*   --    --    ALT  11*   --    --    TBILI  0.3   --    --    ALB  2.1*   --    --    TP  6.0*   --    --        Assessment:     Active Problems:    Pulmonary infiltrate (2/5/2017)      Bilateral leg edema (2/6/2017)      Esophagitis determined by endoscopy (2/7/2017)      Debility (2/9/2017)      Pulmonary hypertension ([facility] [address] (2/12/2017)      Overview:  Left ventricle: Systolic function was hyperdynamic. Ejection fraction was      estimated in the range of 70 % to 75 %. Septal flattening consistent with       RV      pressure and volume overload There were no regional wall motion       abnormalities. PAP est 65mmhg by TR             -  Right ventricle: The ventricle was markedly dilated. Systolic function       was      reduced. Acute respiratory failure ([address] (2/14/2017)    81 yo female is seen with recurrent n/v with recent EGD revealing  moderately severe erosive esophagitis, a polyp at the EG junction that was biopsied, a large hiatal hernia and mild gastroduodenitis. She has had minimal improvement in the nausea with bid Protonix and qid Carafate; she has been placed on scheduled anti-emetic doses today. Family has noted increased agitation with Reglan and are concerned, especially as it has not helped the nausea. Etiology of nausea is likely multi-factorial given her multiple co morbidities and medications.     Plan:     1. Continue to hold Reglan  2. Continue scheduled anti-emetics and PPI  3. Continue Bentyl 10mg BID ac breakfast and dinner  4. Sign off-will be available for questions or concerns    SHANDA Gandhi  [facility]    Patient is seen and examined in collaboration with Dr. Dondra Canavan. Assessment and plan as per Dr. Dondra Canavan. ATTENDING NOTE:  I agree with the above assessment and plan. We will sign off, but do not hesitate to contact us for any additional assistance.       Teresa Rahman MD

## 2017-02-15 NOTE — PROGRESS NOTES
Shift assessment. Pt resting comfortably in bed with family at bedside. Alert and oriented to person and place. Respirations even and unlabored on 1L O2 NC, no acute distress noted. Assessment completed as documented. Pt offers no complaints at this time. Call light in reach, pt encouraged to call for assistance. Will continue to monitor.

## 2017-02-15 NOTE — PROGRESS NOTES
Morning assessment completed. Patient resting in bed with open eyes, family member at the bedside providing support. Demonstrating no signs of dyspnea or distress on 3L oxygen via nasal cannula. Maravilla catheter draining clear yellow urine to gravity. Right interjugular triple lumen catether all clamped. Patient voices no concerns at this time. Will continue to monitor.

## 2017-02-15 NOTE — PROGRESS NOTES
Palliative Care Progress Note    Patient: Jonelle Lam MRN: [id number]  SSN: [id number]    YOB: 1934  Age: 80 y.o. Sex: female       Assessment/Plan:     Chief Complaint/Interval History: Ongoing nausea, debility     Principal Diagnosis:    · Nausea/Vomiting  R11.2    Additional Diagnoses:   Debility, Unspecified  R53.81  Dementia  F03.90   Fatigue, Lethargy  R53.83  Pain, limb  M79.609  Counseling, Encounter for Medical Advice  Z71.9  Encounter for Palliative Care  Z51.5    Palliative Performance Scale (PPS)  PPS: 40    Medical Decision Making:   Reviewed and summarized notes over previous 24 hours. Discussed case with appropriate providers. Reviewed laboratory and x-ray data. Patient resting in bed, daughter-in-law at the bedside. Patient pleasantly confused. States she feels a little bit better, was actually able to eat a bowl of peaches this morning. Continue current regimen- did change IV Zofran to PO. Family has agreed to [facility] presentation. Patient in agreement with this, ready to be at home. Spoke with , as family will need list of private sitter agencies. Will be available if needed. Will discuss findings with members of the interdisciplinary team.         More than 50% of this 15 minute visit was spent counseling and coordination of care as outlined above. Subjective:     Review of Systems:  A comprehensive review of systems was negative except for:   Gastrointestinal: Positive for nausea- improved from yesterday. Objective:     Visit Vitals    /64 (BP 1 Location: Right arm, BP Patient Position: At rest)    Pulse 84    Temp 98.4 °F (36.9 °C)    Resp 17    Ht 5' 3\" (1.6 m)    Wt 60.1 kg (132 lb 8 oz)    SpO2 96%    Breastfeeding No    BMI 23.47 kg/m2       Physical Exam:    General:  Elderly and debilitated appearing. Cooperative. No acute distress. Eyes:  Conjunctivae/corneas clear. Nose: Nares normal. Septum midline. O2 via NC.    Neck: Supple, symmetrical, trachea midline. Lungs:   Diminished bilaterally, unlabored. Heart:  Regular rate and rhythm. Abdomen:   Soft, non-tender, non-distended. Extremities: Normal, atraumatic, no cyanosis. 1+ edema. Skin: Skin color, texture, turgor normal. No rash. Neurologic: Nonfocal.   Psych: Alert and oriented to person and place.      Signed By: Walter Meza NP     February 15, 2017

## 2017-02-16 LAB
GLUCOSE BLD STRIP.AUTO-MCNC: 113 MG/DL (ref 65–100)
GLUCOSE BLD STRIP.AUTO-MCNC: 135 MG/DL (ref 65–100)
GLUCOSE BLD STRIP.AUTO-MCNC: 144 MG/DL (ref 65–100)
GLUCOSE BLD STRIP.AUTO-MCNC: 194 MG/DL (ref 65–100)

## 2017-02-16 PROCEDURE — 94760 N-INVAS EAR/PLS OXIMETRY 1: CPT

## 2017-02-16 PROCEDURE — 74011250637 HC RX REV CODE- 250/637: Performed by: NURSE PRACTITIONER

## 2017-02-16 PROCEDURE — 74011250636 HC RX REV CODE- 250/636: Performed by: NURSE PRACTITIONER

## 2017-02-16 PROCEDURE — 77010033678 HC OXYGEN DAILY

## 2017-02-16 PROCEDURE — 74011250637 HC RX REV CODE- 250/637: Performed by: INTERNAL MEDICINE

## 2017-02-16 PROCEDURE — 65270000029 HC RM PRIVATE

## 2017-02-16 PROCEDURE — 97530 THERAPEUTIC ACTIVITIES: CPT

## 2017-02-16 PROCEDURE — 99232 SBSQ HOSP IP/OBS MODERATE 35: CPT | Performed by: INTERNAL MEDICINE

## 2017-02-16 PROCEDURE — C9113 INJ PANTOPRAZOLE SODIUM, VIA: HCPCS | Performed by: NURSE PRACTITIONER

## 2017-02-16 PROCEDURE — 82962 GLUCOSE BLOOD TEST: CPT

## 2017-02-16 PROCEDURE — 77010033711 HC HIGH FLOW OXYGEN

## 2017-02-16 PROCEDURE — 74011250636 HC RX REV CODE- 250/636: Performed by: INTERNAL MEDICINE

## 2017-02-16 RX ORDER — DILTIAZEM HYDROCHLORIDE 120 MG/1
120 CAPSULE, COATED, EXTENDED RELEASE ORAL DAILY
Status: DISCONTINUED | OUTPATIENT
Start: 2017-02-17 | End: 2017-02-17 | Stop reason: HOSPADM

## 2017-02-16 RX ORDER — PANTOPRAZOLE SODIUM 40 MG/1
40 TABLET, DELAYED RELEASE ORAL
Status: DISCONTINUED | OUTPATIENT
Start: 2017-02-16 | End: 2017-02-17 | Stop reason: HOSPADM

## 2017-02-16 RX ORDER — DICYCLOMINE HYDROCHLORIDE 10 MG/1
10 CAPSULE ORAL
Status: DISCONTINUED | OUTPATIENT
Start: 2017-02-16 | End: 2017-02-17 | Stop reason: HOSPADM

## 2017-02-16 RX ADMIN — Medication 5 ML: at 06:25

## 2017-02-16 RX ADMIN — DIGOXIN 0.12 MG: 0.12 TABLET ORAL at 08:31

## 2017-02-16 RX ADMIN — CARBIDOPA AND LEVODOPA 5 MG: 25; 100 TABLET, EXTENDED RELEASE ORAL at 17:22

## 2017-02-16 RX ADMIN — ONDANSETRON 8 MG: 8 TABLET, ORALLY DISINTEGRATING ORAL at 15:08

## 2017-02-16 RX ADMIN — ONDANSETRON 8 MG: 8 TABLET, ORALLY DISINTEGRATING ORAL at 06:24

## 2017-02-16 RX ADMIN — ONDANSETRON 8 MG: 8 TABLET, ORALLY DISINTEGRATING ORAL at 21:52

## 2017-02-16 RX ADMIN — PANTOPRAZOLE SODIUM 40 MG: 40 INJECTION, POWDER, FOR SOLUTION INTRAVENOUS at 08:31

## 2017-02-16 RX ADMIN — DIAZEPAM 2 MG: 2 TABLET ORAL at 17:22

## 2017-02-16 RX ADMIN — SUCRALFATE 1 G: 1 SUSPENSION ORAL at 11:48

## 2017-02-16 RX ADMIN — SPIRONOLACTONE 25 MG: 25 TABLET ORAL at 17:22

## 2017-02-16 RX ADMIN — PANTOPRAZOLE SODIUM 40 MG: 40 TABLET, DELAYED RELEASE ORAL at 17:22

## 2017-02-16 RX ADMIN — Medication 5 ML: at 15:11

## 2017-02-16 RX ADMIN — SUCRALFATE 1 G: 1 SUSPENSION ORAL at 06:24

## 2017-02-16 RX ADMIN — SPIRONOLACTONE 25 MG: 25 TABLET ORAL at 08:31

## 2017-02-16 RX ADMIN — FUROSEMIDE 80 MG: 40 TABLET ORAL at 08:31

## 2017-02-16 RX ADMIN — ENOXAPARIN SODIUM 40 MG: 40 INJECTION SUBCUTANEOUS at 06:26

## 2017-02-16 RX ADMIN — CARBIDOPA AND LEVODOPA 5 MG: 25; 100 TABLET, EXTENDED RELEASE ORAL at 08:31

## 2017-02-16 RX ADMIN — SUCRALFATE 1 G: 1 SUSPENSION ORAL at 21:51

## 2017-02-16 RX ADMIN — SUCRALFATE 1 G: 1 SUSPENSION ORAL at 17:22

## 2017-02-16 RX ADMIN — DILTIAZEM HYDROCHLORIDE 30 MG: 30 TABLET, FILM COATED ORAL at 08:31

## 2017-02-16 RX ADMIN — DILTIAZEM HYDROCHLORIDE 30 MG: 30 TABLET, FILM COATED ORAL at 15:06

## 2017-02-16 RX ADMIN — DIAZEPAM 2 MG: 2 TABLET ORAL at 08:31

## 2017-02-16 RX ADMIN — DICYCLOMINE HYDROCHLORIDE 10 MG: 10 CAPSULE ORAL at 17:22

## 2017-02-16 NOTE — PROGRESS NOTES
Shift assessment. Pt resting comfortably in bed. Alert and oriented x4. Respirations even and unlabored, no acute distress noted. Assessment completed as documented. Pt offers no complaints at this time. Call light in reach, pt encouraged to call for assistance. Will continue to monitor.

## 2017-02-16 NOTE — PROGRESS NOTES
Pt resting in bed on 3L hf nc. Pt had productive day and sat up in chair for several hours. Maravilla catheter and central line removed. Plan to d/c home with hospice.

## 2017-02-16 NOTE — PROGRESS NOTES
Jonelle Lam  Admission Date: 2/5/2017             Daily Progress Note: 2/16/2017    The patient's chart is reviewed and the patient is discussed with the staff. Subjective:      Still nauseated but her sister states it is better and she was able to eat some today. Nausea is continuous but does get better at times. Too weak to stand but up in the chair earlier today.      Current Facility-Administered Medications   Medication Dose Route Frequency    pantoprazole (PROTONIX) tablet 40 mg  40 mg Oral ACB&D    [START ON 2/17/2017] dilTIAZem CD (CARDIZEM CD) capsule 120 mg  120 mg Oral DAILY    dicyclomine (BENTYL) capsule 10 mg  10 mg Oral TIDAC    diazePAM (VALIUM) tablet 2 mg  2 mg Oral BID    ondansetron (ZOFRAN ODT) tablet 8 mg  8 mg Oral Q8H    lip protectant (BLISTEX) ointment   Topical PRN    HYDROcodone-homatropine (HYCODAN) 5-1.5 mg/5 mL (5 mL) syrup 5 mL  5 mL Oral Q4H PRN    promethazine (PHENERGAN) with saline injection 12.5 mg  12.5 mg IntraVENous Q6H PRN    furosemide (LASIX) tablet 80 mg  80 mg Oral DAILY    spironolactone (ALDACTONE) tablet 25 mg  25 mg Oral BID    enoxaparin (LOVENOX) injection 40 mg  40 mg SubCUTAneous Q24H    hydrocortisone (ANUSOL-HC) 2.5 % rectal cream   PeriANAL QID PRN    digoxin (LANOXIN) tablet 0.125 mg  0.125 mg Oral DAILY    NUTRITIONAL SUPPORT ELECTROLYTE PRN ORDERS   Does Not Apply PRN    acetaminophen (TYLENOL) tablet 500 mg  500 mg Oral Q4H PRN    busPIRone (BUSPAR) tablet 5 mg  5 mg Oral BID    morphine injection 2 mg  2 mg IntraVENous Q4H PRN    sodium chloride (NS) flush 5 mL  5 mL InterCATHeter Q8H    sodium chloride (NS) flush 5-10 mL  5-10 mL InterCATHeter PRN    influenza vaccine 2016-17 (36mos+)(PF) (FLUZONE/FLUARIX/FLULAVAL QUAD) injection 0.5 mL  0.5 mL IntraMUSCular PRIOR TO DISCHARGE    sucralfate (CARAFATE) 100 mg/mL oral suspension 1 g  1 g Oral AC&HS    sodium chloride (NS) flush 5-10 mL  5-10 mL IntraVENous PRN         Objective:     Vitals:    02/16/17 0700 02/16/17 0730 02/16/17 1100 02/16/17 1548   BP: 108/56  100/65    Pulse: 79  98    Resp: 12  15    Temp: 97.5 °F (36.4 °C)  97.5 °F (36.4 °C)    SpO2: 98%  98% 97%   Weight:  129 lb 1.6 oz (58.6 kg)     Height:         Intake and Output:   02/14 1901 - 02/16 0700  In: 120 [P.O.:120]  Out: 2400 [Urine:2400]       Physical Exam:   Constitutional:  the patient is well developed and in no acute distress  HEENT:  Sclera clear, pupils equal, oral mucosa moist  Lungs: clear bilaterally. Wearing 2.5 liter cannula. Cardiovascular:  Irregular and without M,G,R  Abd/GI: soft and non-tender; with positive bowel sounds. Ext: warm without cyanosis. There is no lower leg edema. Musculoskeletal: moves all four extremities with equal strength  Skin:  no jaundice or rashes, no wounds   Neuro: no gross neuro deficits   Musculoskeletal: can't ambulate. No deformity  Psychiatric: Calm. ROS:  Ongoing nausea, weak, intermittent dyspnea  Lines: central line right neck, white    CHEST XRAY:       LAB  Recent Labs      02/14/17   1740   WBC  8.2   HGB  11.3*   HCT  36.3   PLT  344     Recent Labs      02/15/17   0800  02/14/17   1740   NA  143  142   K  3.1*  2.5*   CL  99  98   CO2  36*  35*   GLU  161*  218*   BUN  14  12   CREA  0.91  1.05*   MG  1.9   --    ALB   --   2.1*   SGOT   --   10*   BNPP   --   601     No results for input(s): PH, PCO2, PO2, HCO3 in the last 72 hours. Assessment:  (Medical Decision Making)     Hospital Problems  Date Reviewed: 2/9/2017          Codes Class Noted POA    Acute respiratory failure [facility]) ICD-10-CM: J96.00  ICD-9-CM: 518.81  2/14/2017 Yes    Now on low flow nasal cannula. Was on airvo    Pulmonary hypertension ([facility] [address] (Chronic) ICD-10-CM: I27.2  ICD-9-CM: 416.8  2/12/2017 Yes    Overview Signed 2/12/2017 12:41 PM by Inna Gonzalez MD      Left ventricle: Systolic function was hyperdynamic.  Ejection fraction was  estimated in the range of 70 % to 75 %. Septal flattening consistent with RV  pressure and volume overload There were no regional wall motion   abnormalities. PAP est 65mmhg by TR     -  Right ventricle: The ventricle was markedly dilated. Systolic function was  reduced. No workup planned per cardiology. On daily lasix    Debility (Chronic) ICD-10-CM: R53.81  ICD-9-CM: 799.3  2/9/2017 Yes    Cannot ambulate but up in the chair earlier    Esophagitis determined by endoscopy ICD-10-CM: K20.9  ICD-9-CM: 530.10  2/7/2017 Yes    On BID PPI with carafate    Bilateral leg edema ICD-10-CM: R60.0  ICD-9-CM: 782.3  2/6/2017 Yes    Improved per family. On daily lasix. Diastolic dysfunction    Pulmonary infiltrate ICD-10-CM: R91.8  ICD-9-CM: 793.19  2/5/2017 Yes    ? Aspiration. Has completed abx. Plan:  (Medical Decision Making)   1. Family planning to go home with hospice - in the process     Ree Part, NP    More than 50% of time documented was spent face-to-face contact with the patient and in the care of the patient on the floor/unit where the patient is located. I have spoken with and examined the patient. I agree with the above assessment and plan as documented.     2.5lpm  Gen:  Pleasant, sleepy  Lungs: few quiet wheezes anteriorly  Heart:  RRR with no Murmur/Rubs/Gallops  Ext: 2+ edema    --continue supportive care for nausea/esophagitis with zofran, PPI, carafate  --continue lasix  --likely home with hospice tomorrow  William Frances MD

## 2017-02-16 NOTE — PROGRESS NOTES
Problem: Nutrition Deficit  Goal: *Optimize nutritional status  Nutrition F/U: Day 5  Assessment:   Diet order(s): GI soft, Ensure clear TID, ensure enlive BID  Food/Nutrition History: Pt sitting up and much more alert today. Family states appetite was better yesterday. Pt reports not really liking the ensure enlives but still enjoys the ensure clear. Palliative care is following pt and hospice is being discussed. Anthropometrics: Height: 5' 3\" (160 cm), Weight: 69.85 kg (153 lb 12 oz), Weight Source: Bed, Body mass index is 27 kg/(m^2). BMI class of healthy weight for age >71. Edema is noted at 1+ to BLE. Macronutrient needs (revised):  EER: 4595-2365 kcal /day (20-25 kcal/kg ABW)  EPR: 52-63 grams protein/day (1-1.2 grams/kg IBW)  Intake/Comparative Standards: Current intake from past 15 recorded meals is: 10%. This does not meet estimated kcal or protein needs. Intervention:  Meals and snacks: Continue GI soft diet and advance as able  Medical Nutrition Supplements: continue ensure clear tid and add ensure enlive BID, Will add magic cup q daily and mighty shake q daily. [address], [address] [place] [address],  [phone]

## 2017-02-16 NOTE — PROGRESS NOTES
Problem: Mobility Impaired (Adult and Pediatric)  Goal: *Acute Goals and Plan of Care (Insert Text)  STG:  (1.)Ms. Ceci Perez will move from supine to sit and sit to supine , scoot up and down and roll side to side in bed with MAXIMAL ASSIST within 7 day(s). (2.)Ms. Ceci Perez will transfer from bed to chair and chair to bed with MAXIMAL ASSIST using the least restrictive device within 7 day(s). (3.)Ms. Ceci Perez will maintain seated balance and upright posture with SUPERVISION and no external support within 7 days. LTG:  (1.)Ms. Ceci Perez will move from supine to sit and sit to supine , scoot up and down and roll side to side in bed with MODERATE ASSIST within 14 day(s). (2.)Ms. Ceci Perez will transfer from bed to chair and chair to bed with MODERATE ASSIST using the least restrictive device within 14 day(s). (3.)Ms. Ceci Perez will ambulate with MODERATE ASSIST for 10+ feet with the least restrictive device within 14 day(s). ________________________________________________________________________________________________      PHYSICAL THERAPY: Daily Note, Treatment Day: 3rd and AM 2/16/2017  INPATIENT: Hospital Day: 12  [place] / [place] / Product Type: Medicare /      NAME/AGE/GENDER: Sylvia Hunt is a 80 y.o. female              PRIMARY DIAGNOSIS: Nausea and vomiting, intractability of vomiting not specified, unspecified vomiting type [R11.2]  Rectal mass [K62.9] Septic shock (HCC) Septic shock (HCC)  Procedure(s) (LRB):  ESOPHAGOGASTRODUODENOSCOPY (EGD) At bedside (N/A)  SIGMOIDOSCOPY FLEXIBLE at bedside (N/A)  ESOPHAGOGASTRODUODENAL (EGD) BIOPSY (N/A)  9 Days Post-Op  ICD-10: Treatment Diagnosis:       · Generalized Muscle Weakness (M62.81)  · Difficulty in walking, Not elsewhere classified (R26.2)  · Abnormal posture (R29.3)   Precaution/Allergies:  Aspirin and Pcn [penicillins]       ASSESSMENT:      Ms. Ceci Perez supine on arrival, just finishing with bath, family present.  Pt on 2.5 L/min O2 via n.c. Pt states nausea, required encouragement to participate and to perform any activity. Pt required increased assist this date with bed mobility and transfers. Pt with poor static sitting balance, required verbal and tactile cues and increased assist at bedside. Pt total assist x 2 for sit to stand and transfer from bed to chair. Pt with limited progress toward goals today, increased fatigue. PT to cont to follow for acute care needs. This section established at most recent assessment   PROBLEM LIST (Impairments causing functional limitations):  1. Decreased Strength  2. Decreased Transfer Abilities  3. Decreased Ambulation Ability/Technique  4. Decreased Balance  5. Decreased Activity Tolerance    INTERVENTIONS PLANNED: (Benefits and precautions of physical therapy have been discussed with the patient.)  1. Balance Exercise  2. Bed Mobility  3. Family Education  4. Gait Training  5. Therapeutic Activites  6. Therapeutic Exercise/Strengthening  7. Group Therapy      TREATMENT PLAN: Frequency/Duration: 5 times a week for duration of hospital stay  Rehabilitation Potential For Stated Goals: FAIR      RECOMMENDED REHABILITATION/EQUIPMENT: (at time of discharge pending progress): Continue Skilled Therapy and Rehab. HISTORY:   History of Present Injury/Illness (Reason for Referral):  Per H&P, \"82yoF with a PMH of HTN, syncope, mild dementia who was recently hospitalized at [facility] after a fall and was discharged to rehab. Presents to ER from her rehab with hypotension and given IVFs but required Levophed. Had a recent UTI with E. Coli and treated with antibiotics. She has had persistent cough since December. Creatinine was elevated 2.12 and CXR is notable for possible retrocardiac infiltrate. GI consulted for N/V and EGD with severe erosive esophagitis, biopsy at EG junction, large hiatal hernia and mild gastroduodenitis. Flex sig with large hemorrhoids.  Weaned off Levophed, diet advanced to clear liquids and moved to the floor. Placed on Opti-flow for shortness of breath. \"  Past Medical History/Comorbidities:   Ms. William Gonzalez  has a past medical history of Arrhythmia; CAD (coronary artery disease); Chronic pain; Gastrointestinal disorder; Gastrointestinal disorder; and GERD (gastroesophageal reflux disease). Ms. William Gonzalez  has a past surgical history that includes hysterectomy; breast surgery procedure unlisted; and flexible sigmoidoscopy (N/A, 2/7/2017). Social History/Living Environment:   Home Environment: Private residence  # Steps to Enter: 0  Wheelchair Ramp: Yes  One/Two Story Residence: One story  Living Alone: No  Support Systems: Family member(s)  Patient Expects to be Discharged to[de-identified] Rehabilitation facility  Current DME Used/Available at Home: Walker, rolling  Tub or Shower Type: Shower  Prior Level of Function/Work/Activity:  Ms. William Gonzalez required assistance for all bathing and dressing and was using a rolling walker for ambulation. Daughter states that pt also has R knee pain that limits her mobility and was receiving injections for pain. She was incontinent of B/B due to her limited mobility. Number of Personal Factors/Comorbidities that affect the Plan of Care:  Low PLOF  R knee pain  Supplemental O2 3+: HIGH COMPLEXITY   EXAMINATION:   Most Recent Physical Functioning:   Gross Assessment:                  Posture:  Posture Assessment: Forward head, Rounded shoulders  Balance:  Sitting: Impaired  Sitting - Static: Fair (occasional); Poor (constant support) (posterior lean this date, not assisting as much)  Sitting - Dynamic: Poor (constant support)  Standing: Impaired  Standing - Static: Poor  Standing - Dynamic : Poor Bed Mobility:  Rolling: Maximum assistance  Supine to Sit: Maximum assistance  Sit to Supine:  (left up in chair)  Scooting: Total assistance  Wheelchair Mobility:     Transfers: N/A  Sit to Stand:  Total assistance;Assist x2  Stand to Sit: Total assistance;Assist x2  Bed to Chair: Total assistance;Assist x2  Gait: N/A     Base of Support: Narrowed  Speed/Falguni: Pace decreased (<100 feet/min); Shuffled  Step Length: Left shortened;Right shortened  Swing Pattern: Left asymmetrical;Right asymmetrical  Gait Abnormalities: Decreased step clearance;Shuffling gait; Steppage gait (posterior lean)  Distance (ft): 1 Feet (ft) (from bed to chair)  Assistive Device: Gait belt;Walker, rolling  Ambulation - Level of Assistance: Maximum assistance; Total assistance (x 2)  Interventions: Safety awareness training; Tactile cues; Verbal cues       Body Structures Involved:  1. Muscles Body Functions Affected:  1. Respiratory  2. Neuromusculoskeletal  3. Movement Related Activities and Participation Affected:  1. General Tasks and Demands  2. Mobility  3. Self Care   Number of elements that affect the Plan of Care: 4+: HIGH COMPLEXITY   CLINICAL PRESENTATION:   Presentation: Evolving clinical presentation with changing clinical characteristics: MODERATE COMPLEXITY   CLINICAL DECISION MAKIN [facility] Inpatient Short Form  How much difficulty does the patient currently have. .. Unable A Lot A Little None   1. Turning over in bed (including adjusting bedclothes, sheets and blankets)? [ ] 1   [X] 2   [ ] 3   [ ] 4   2. Sitting down on and standing up from a chair with arms ( e.g., wheelchair, bedside commode, etc.)   [X] 1   [ ] 2   [ ] 3   [ ] 4   3. Moving from lying on back to sitting on the side of the bed? [ ] 1   [X] 2   [ ] 3   [ ] 4   How much help from another person does the patient currently need. .. Total A Lot A Little None   4. Moving to and from a bed to a chair (including a wheelchair)? [X] 1   [ ] 2   [ ] 3   [ ] 4   5. Need to walk in hospital room? [X] 1   [ ] 2   [ ] 3   [ ] 4   6. Climbing 3-5 steps with a railing? [X] 1   [ ] 2   [ ] 3   [ ] 4   © , Trustees of [address] [place] [address] [address] [address], under license to [employer].  All rights reserved    Score:  Initial: 8 Most Recent: X (Date: 2/9/17 )     Interpretation of Tool:  Represents activities that are increasingly more difficult (i.e. Bed mobility, Transfers, Gait). Score 24 23 22-20 19-15 14-10 9-7 6       Modifier CH CI CJ CK CL CM CN         · Mobility - Walking and Moving Around:               - CURRENT STATUS:    CM - 80%-99% impaired, limited or restricted               - GOAL STATUS:           CL - 60%-79% impaired, limited or restricted               - D/C STATUS:                       ---------------To be determined---------------  [place] / [place] / Product Type: Medicare /       Medical Necessity:     · Patient is expected to demonstrate progress in strength, range of motion, balance and functional technique to decrease assistance required with bed mobility and transfers and improve safety during functional activity. Reason for Services/Other Comments:  · Patient continues to require present interventions due to patient's inability to tolerate and maintain upright sitting and complete functional mobility safely without assistance. .   Use of outcome tool(s) and clinical judgement create a POC that gives a: Questionable prediction of patient's progress: MODERATE COMPLEXITY                 TREATMENT:   (In addition to Assessment/Re-Assessment sessions the following treatments were rendered)   Pre-treatment Symptoms/Complaints:  \"Just let me lay down\"  Pain: Initial:   Pain Intensity 1: 0  Post Session:  0/10      Therapeutic Activity: (    15 minutes): Therapeutic activities including Bed transfers, sitting balance on the EOB with constant cueing to help maintain static sitting balance, sit to stand, ambulation on level floor with walker with max/total assist x 2 to improve mobility, strength and balance. Required moderate/max A Safety awareness training; Tactile cues; Verbal cues to promote static and dynamic balance in standing and promote coordination of bilateral, lower extremity(s). Therapeutic Exercise: ( ):  Exercises per grid below to improve mobility, strength and coordination. Required moderate verbal and manual cues to promote proper body mechanics. Progressed complexity of movement as indicated. Date:  2/13/17 Date:   Date:     Activity/Exercise Parameters Parameters Parameters   Supine AP 10x B     Supine heel slides 10x B     Seated forward reach 5x                                    Braces/Orthotics/Lines/Etc:   · IV and white catheter  Treatment/Session Assessment:    · Response to Treatment:  Pt decreased activity tolerance this date and required increased assist with mobility and balance. · Interdisciplinary Collaboration:  · Physical Therapist, Registered Nurse and Rehabilitation Attendant  · After treatment position/precautions:  · Up in chair, Bed/Chair-wheels locked, Bed in low position, Call light within reach and Family at bedside  · Compliance with Program/Exercises: Will assess as treatment progresses. · Recommendations/Intent for next treatment session: \"Next visit will focus on advancements to more challenging activities and reduction in assistance provided\".   Total Treatment Duration:  PT Patient Time In/Time Out  Time In: 1036  Time Out: 620 [facility],  SPT

## 2017-02-17 VITALS
TEMPERATURE: 99.1 F | RESPIRATION RATE: 18 BRPM | HEART RATE: 102 BPM | OXYGEN SATURATION: 90 % | HEIGHT: 63 IN | DIASTOLIC BLOOD PRESSURE: 46 MMHG | SYSTOLIC BLOOD PRESSURE: 99 MMHG | WEIGHT: 125.7 LBS | BODY MASS INDEX: 22.27 KG/M2

## 2017-02-17 LAB
GLUCOSE BLD STRIP.AUTO-MCNC: 125 MG/DL (ref 65–100)
GLUCOSE BLD STRIP.AUTO-MCNC: 137 MG/DL (ref 65–100)
GLUCOSE BLD STRIP.AUTO-MCNC: 156 MG/DL (ref 65–100)

## 2017-02-17 PROCEDURE — 74011250637 HC RX REV CODE- 250/637: Performed by: NURSE PRACTITIONER

## 2017-02-17 PROCEDURE — 99239 HOSP IP/OBS DSCHRG MGMT >30: CPT | Performed by: INTERNAL MEDICINE

## 2017-02-17 PROCEDURE — 74011250637 HC RX REV CODE- 250/637: Performed by: INTERNAL MEDICINE

## 2017-02-17 PROCEDURE — 74011250636 HC RX REV CODE- 250/636: Performed by: INTERNAL MEDICINE

## 2017-02-17 PROCEDURE — 82962 GLUCOSE BLOOD TEST: CPT

## 2017-02-17 RX ORDER — DIAZEPAM 2 MG/1
2 TABLET ORAL 2 TIMES DAILY
Qty: 60 TAB | Refills: 0 | Status: SHIPPED | OUTPATIENT
Start: 2017-02-17 | End: 2017-03-19

## 2017-02-17 RX ORDER — SPIRONOLACTONE 25 MG/1
25 TABLET ORAL 2 TIMES DAILY
Qty: 60 TAB | Refills: 0 | Status: SHIPPED | OUTPATIENT
Start: 2017-02-17 | End: 2017-03-19

## 2017-02-17 RX ORDER — HYDROCODONE BITARTRATE AND HOMATROPINE METHYLBROMIDE 1.5; 5 MG/5ML; MG/5ML
5 SYRUP ORAL
Qty: 240 ML | Refills: 1 | Status: SHIPPED | OUTPATIENT
Start: 2017-02-17 | End: 2021-01-13

## 2017-02-17 RX ORDER — ONDANSETRON 8 MG/1
8 TABLET, ORALLY DISINTEGRATING ORAL EVERY 8 HOURS
Qty: 90 TAB | Refills: 0 | Status: SHIPPED | OUTPATIENT
Start: 2017-02-17 | End: 2017-03-19

## 2017-02-17 RX ORDER — HYDROCORTISONE 25 MG/G
1 CREAM TOPICAL 2 TIMES DAILY
Qty: 30 G | Refills: 0 | Status: SHIPPED | OUTPATIENT
Start: 2017-02-17 | End: 2021-01-13

## 2017-02-17 RX ORDER — PANTOPRAZOLE SODIUM 40 MG/1
40 TABLET, DELAYED RELEASE ORAL
Qty: 60 TAB | Refills: 0 | Status: SHIPPED | OUTPATIENT
Start: 2017-02-17 | End: 2017-03-19

## 2017-02-17 RX ORDER — DILTIAZEM HYDROCHLORIDE 120 MG/1
120 CAPSULE, COATED, EXTENDED RELEASE ORAL DAILY
Qty: 30 CAP | Refills: 1 | Status: SHIPPED | OUTPATIENT
Start: 2017-02-17 | End: 2017-03-19

## 2017-02-17 RX ORDER — SUCRALFATE 1 G/10ML
1 SUSPENSION ORAL
Qty: 414 ML | Refills: 1 | Status: SHIPPED | OUTPATIENT
Start: 2017-02-17 | End: 2021-01-13

## 2017-02-17 RX ORDER — DICYCLOMINE HYDROCHLORIDE 10 MG/1
10 CAPSULE ORAL
Qty: 90 CAP | Refills: 0 | Status: SHIPPED | OUTPATIENT
Start: 2017-02-17 | End: 2017-03-19

## 2017-02-17 RX ORDER — DIGOXIN 125 MCG
0.12 TABLET ORAL DAILY
Qty: 30 TAB | Refills: 0 | Status: SHIPPED | OUTPATIENT
Start: 2017-02-17 | End: 2017-03-19

## 2017-02-17 RX ORDER — BUSPIRONE HYDROCHLORIDE 5 MG/1
5 TABLET ORAL 2 TIMES DAILY
Qty: 60 TAB | Refills: 0 | Status: SHIPPED | OUTPATIENT
Start: 2017-02-17 | End: 2017-03-19

## 2017-02-17 RX ADMIN — SPIRONOLACTONE 25 MG: 25 TABLET ORAL at 09:50

## 2017-02-17 RX ADMIN — DIGOXIN 0.12 MG: 0.12 TABLET ORAL at 09:49

## 2017-02-17 RX ADMIN — CARBIDOPA AND LEVODOPA 5 MG: 25; 100 TABLET, EXTENDED RELEASE ORAL at 09:47

## 2017-02-17 RX ADMIN — ENOXAPARIN SODIUM 40 MG: 40 INJECTION SUBCUTANEOUS at 06:10

## 2017-02-17 RX ADMIN — DILTIAZEM HYDROCHLORIDE 120 MG: 120 CAPSULE, EXTENDED RELEASE ORAL at 09:49

## 2017-02-17 RX ADMIN — DIAZEPAM 2 MG: 2 TABLET ORAL at 09:48

## 2017-02-17 RX ADMIN — SUCRALFATE 1 G: 1 SUSPENSION ORAL at 11:30

## 2017-02-17 RX ADMIN — HYDROCODONE BITARTRATE AND HOMATROPINE METHYLBROMIDE 5 ML: 5; 1.5 SOLUTION ORAL at 02:37

## 2017-02-17 RX ADMIN — FUROSEMIDE 80 MG: 40 TABLET ORAL at 09:50

## 2017-02-17 RX ADMIN — ONDANSETRON 8 MG: 8 TABLET, ORALLY DISINTEGRATING ORAL at 14:08

## 2017-02-17 RX ADMIN — PANTOPRAZOLE SODIUM 40 MG: 40 TABLET, DELAYED RELEASE ORAL at 06:11

## 2017-02-17 RX ADMIN — DICYCLOMINE HYDROCHLORIDE 10 MG: 10 CAPSULE ORAL at 06:11

## 2017-02-17 RX ADMIN — ONDANSETRON 8 MG: 8 TABLET, ORALLY DISINTEGRATING ORAL at 06:11

## 2017-02-17 RX ADMIN — DICYCLOMINE HYDROCHLORIDE 10 MG: 10 CAPSULE ORAL at 11:30

## 2017-02-17 RX ADMIN — HYDROCODONE BITARTRATE AND HOMATROPINE METHYLBROMIDE 5 ML: 5; 1.5 SOLUTION ORAL at 14:08

## 2017-02-17 RX ADMIN — SUCRALFATE 1 G: 1 SUSPENSION ORAL at 06:11

## 2017-02-17 NOTE — PROGRESS NOTES
Report  Received from Judy Miller, patient resting in bed, family members at bedside, pt with no signs and symptoms of distress.

## 2017-02-17 NOTE — DISCHARGE INSTRUCTIONS
Hospice: Care Instructions  Your Care Instructions  Hospice care provides medical treatment to relieve symptoms at the end of life. The goal is to keep you comfortable, not to try to cure you. Hospice care does not speed up or lengthen dying. It focuses on easing pain and other symptoms. Hospice caregivers want to enhance your quality of life. Hospice care also offers emotional help and spiritual support when you are dying. It also helps family members care for a loved one who is dying. Hospice care can help you review your life, say important things to family and friends, and explore spiritual issues. Hospice also helps your family and friends deal with their grief after you die. You can use hospice care if your illness cannot be cured and doctors believe you have no more than 6 months to live. You do not need to be confined to a bed or in a hospital to benefit from this type of care. The hospice team includes nurses, counselors, therapists, social workers, pastors, home health aides, and trained volunteers. You can get care in your own home or in a hospice center. Some hospice workers also go to nursing homes or hospitals. How can you care for yourself at home? · Prepare a list of advance directives. These are instructions to your doctor and family members about what kind of care you want if you become unable to speak or express yourself. · Find out if your health insurance covers hospice care. · Find hospice programs in your area. People who can help include your doctor, your state health department, and your insurance company. · Decide what kinds of hospice services you want. It helps to know what you want before you enter a hospice program.  · Think about some questions when preparing for hospice care. ¨ Who do you want to make decisions about your medical care if you are not able to? Many people choose their spouse, child, or doctor. ¨ What are you most afraid of that might happen?  You might be afraid of having pain or losing your independence. Let your hospice team know your fears. The team can help you deal with them. ¨ Where would you prefer to die? Choices include your home, a hospital, or a nursing home. ¨ Do you want to donate organs when you die? Make sure that your family clearly understands this. ¨ Do you want any Jainism rites or practices to be done before you die? Let your hospice team know what you want. Where can you learn more? Go to http://art-elmo.info/. Enter X492 in the search box to learn more about \"Hospice: Care Instructions. \"  Current as of: February 24, 2016  Content Version: 11.1  © 0327-4265 [employer]. Care instructions adapted under license by [employer] (which disclaims liability or warranty for this information). If you have questions about a medical condition or this instruction, always ask your healthcare professional. [address] any warranty or liability for your use of this information.

## 2017-02-17 NOTE — PROGRESS NOTES
Discharge instructions and prescriptions given by Zaida Robertson RN. Family at bedside voiced understanding. Patient denies pain or shortness of breath at present. Patient discharged to home by [facility], via [employer].

## 2017-02-17 NOTE — PROGRESS NOTES
Discharge instructions and prescriptions provided and explained to the pt's family. Med side effect sheet reviewed. Opportunity for questions provided. Pt will be leaving via EMS transport at 1600.

## 2017-02-17 NOTE — PROGRESS NOTES
Reassessment unchanged. Patient resting in bed, family at bedside. Awaiting transport for discharge. NAD noted. Respirations even and non labored. Will continue to monitor.

## 2017-02-17 NOTE — PROGRESS NOTES
Physical assessment completed, pt alert and oriented, no signs or symptoms of distress, call light within reach, family member in patient room.

## 2017-02-17 NOTE — PROGRESS NOTES
Pt will discharge home via [place] at 4:00 pm with [facility]. Family has hired 24 hour caregivers to assist with pt care. SW contacted [facility] [place] to inform of dc time and faxed the DC Summary and order for hospice. Family is at bedside and agreeable to time.

## 2017-02-17 NOTE — DISCHARGE SUMMARY
[facility]: Discharge Summary    Marla Arevalo    Admission date:2/5/2017    Discharge date: 2/17/2017    Admitting Diagnosis:Nausea and vomiting, intractability of vomiting not specified, unspecified vomiting type [R11.2]  Rectal mass [K62.9]    Discharge Diagnoses:    Hospital Problems  Date Reviewed: 2/9/2017          Codes Class Noted POA    Acute respiratory failure ([address] ICD-10-CM: J96.00  ICD-9-CM: 518.81  2/14/2017 Yes        Pulmonary hypertension (HCC) (Chronic) ICD-10-CM: I27.2  ICD-9-CM: 416.8  2/12/2017 Yes    Overview Signed 2/12/2017 12:41 PM by Maritza Barragan MD      Left ventricle: Systolic function was hyperdynamic. Ejection fraction was  estimated in the range of 70 % to 75 %. Septal flattening consistent with RV  pressure and volume overload There were no regional wall motion   abnormalities. PAP est 65mmhg by TR     -  Right ventricle: The ventricle was markedly dilated. Systolic function was  reduced. Debility (Chronic) ICD-10-CM: R53.81  ICD-9-CM: 799.3  2/9/2017 Yes        Esophagitis determined by endoscopy ICD-10-CM: K20.9  ICD-9-CM: 530.10  2/7/2017 Yes        Bilateral leg edema ICD-10-CM: R60.0  ICD-9-CM: 782.3  2/6/2017 Yes        Pulmonary infiltrate ICD-10-CM: R91.8  ICD-9-CM: 793.19  2/5/2017 Yes              Consultants:  Cardiology  GI   Palliative Care  Hospice    Studies/Procedures:  Procedure(s) with comments:  ESOPHAGOGASTRODUODENOSCOPY (EGD) At bedside - EGD  Room 3305  SIGMOIDOSCOPY FLEXIBLE at bedside - Flex sig  Room 3305  ESOPHAGOGASTRODUODENAL (EGD) [facility] course:    82yoF with one month of nausea/vomiting/inability to eat, history of UTIs, recent fall who is admitted with septic shock of unclear origin. Chest Xray suggestive of retrocardiac density which appeared minor on abdominal CT. Rectal wall thickening on CT. She was seen by GI. Initially, she required pressors but weaned to off.  EGD was done and revealed moderately severe erosive esophagitis, a polyp at the EG junction that was biopsied, a large hiatal hernia and mild gastroduodenitis. Hemorrhoids seen with flexsig. She was prescribed carafate and PPI. GI signed off. Advanced diet but unable to tolerate with ongoing N/V. Developed atrial fibrillation with RVR 2/10. Cardiology consulted and HR improved. Overall doing poorly and family decided to make patient DNR on 2/10.  She was given lasix and oxygen weaned. She is very preload sensitive regarding her forward cardiac output. Cardiology recommended to be cautious with diuretics. Echocardiogram was done and Pulmonary htn appears advanced and chronic by echo. She does not appear to be a candidate for RHC and phosphodiesterase inhibition. Patient on Lasix 80 mg daily. Aggressive rate control and negative inotropic agents should be avoided.       Nausea and limited oral intake is still a primary problem and GI asked to see to improve symptoms. PC consulted and met with family and discussed hospice. Hospice consulted for home hospice. She is a DNR and plans to pursue comfort care. Discharge Exam:  Visit Vitals    /54    Pulse 72    Temp 98 °F (36.7 °C)    Resp 16    Ht 5' 3\" (1.6 m)    Wt 125 lb 11.2 oz (57 kg)    SpO2 97%    Breastfeeding No    BMI 22.27 kg/m2       General appearance: alert, debilitated   Respiratory: clear to auscultation bilaterally 2 lpm  Cardiovascular: S1, S2, no murmur, click, rub or gallop   GI: soft, non-tender. + Bowel sounds X 4 Q. Musculoskeletal: no cyanosis or edema   Skin: Skin color, texture, turgor appropriate. No lesions noted. Neurologic: Alert and oriented X 3- appropriate. Weak. Discharge Medications: . Current Discharge Medication List      START taking these medications    Details   !! sucralfate (CARAFATE) 100 mg/mL suspension Take 10 mL by mouth Before breakfast, lunch, dinner and at bedtime.   Qty: 414 mL, Refills: 1      busPIRone (BUSPAR) 5 mg tablet Take 1 Tab by mouth two (2) times a day for 30 days. Qty: 60 Tab, Refills: 0      hydrocortisone (ANUSOL-HC) 2.5 % rectal cream Insert 1 g into rectum two (2) times a day. Qty: 30 g, Refills: 0      ondansetron (ZOFRAN ODT) 8 mg disintegrating tablet Take 1 Tab by mouth every eight (8) hours for 30 days. Qty: 90 Tab, Refills: 0      dicyclomine (BENTYL) 10 mg capsule Take 1 Cap by mouth Before breakfast, lunch, and dinner for 30 days. Qty: 90 Cap, Refills: 0      diazePAM (VALIUM) 2 mg tablet Take 1 Tab by mouth two (2) times a day for 30 days. Max Daily Amount: 4 mg. Qty: 60 Tab, Refills: 0      dilTIAZem CD (CARDIZEM CD) 120 mg ER capsule Take 1 Cap by mouth daily for 30 days. Qty: 30 Cap, Refills: 1      digoxin (LANOXIN) 0.125 mg tablet Take 1 Tab by mouth daily for 30 days. Qty: 30 Tab, Refills: 0      spironolactone (ALDACTONE) 25 mg tablet Take 1 Tab by mouth two (2) times a day for 30 days. Qty: 60 Tab, Refills: 0      HYDROcodone-homatropine (HYCODAN) 5-1.5 mg/5 mL (5 mL) syrup Take 5 mL by mouth every four (4) hours as needed for Cough. Max Daily Amount: 30 mL. Qty: 240 mL, Refills: 1       !! - Potential duplicate medications found. Please discuss with provider. CONTINUE these medications which have CHANGED    Details   pantoprazole (PROTONIX) 40 mg tablet Take 1 Tab by mouth Before breakfast and dinner for 30 days. Qty: 60 Tab, Refills: 0         CONTINUE these medications which have NOT CHANGED    Details   furosemide (LASIX) 20 mg tablet Take 20 mg by mouth daily. potassium chloride SR (KLOR-CON 10) 10 mEq tablet Take 10 mEq by mouth daily. !! sucralfate (CARAFATE) 100 mg/mL suspension Take 1 tsp by mouth four (4) times daily as needed. metoclopramide HCl (REGLAN) 5 mg tablet Take 1 Tab by mouth every six (6) hours as needed for Nausea. Qty: 20 Tab, Refills: 0       !! - Potential duplicate medications found. Please discuss with provider.       STOP taking these medications metoprolol succinate (TOPROL-XL) 25 mg XL tablet Comments:   Reason for Stopping:                 Activity: as desired     Diet Restrictions: as desired    Disposition: fair, hospice appropriate    Followup/Outpt Studies        Home with hospice care  DNR    Leida Carlton NP    I have spoken with and examined the patient. I agree with the above assessment and plan as documented.     Gen: pleasant, in no distress on 2.5lpm  Lungs: crackles in bases  Heart:  RRR with no Murmur/Rubs/Gallops  Ext: no edema    Planning for discharge to day to home with hospice services under Kimberly Garcia MD

## 2017-02-17 NOTE — PROGRESS NOTES
Assumed care of patient. Assessment completed and documented, see docflow. Patient A/O resting quietly in bed, comfortable on 4LHFNC. NAD noted at present. Respirations even and non labored. Bed locked and in low position, side rails up x 2. Door open, call light within reach. Will continue POC.

## 2017-02-18 ENCOUNTER — PATIENT OUTREACH (OUTPATIENT)
Dept: CASE MANAGEMENT | Age: 82
End: 2017-02-18

## 2017-02-18 NOTE — PROGRESS NOTES
Patient discharged home with Hospice orders. Patient will be followed by RN and other Hospice team members. Due to hospice admission there are no further HARITHA needs at current time. Patient ineligible for [facility] CM program. This note will not be viewable in [address]

## 2017-02-22 NOTE — PROGRESS NOTES
This note will not be viewable in [address] Per note by OMID Lindsey LPN on 4/38/9600 below. Sofia Holliday LPN at 32/00/43 [id number]   Status: Signed          Patient discharged home with Hospice orders. Patient will be followed by RN and other Hospice team members. Due to hospice admission there are no further HARITHA needs at current time. Patient ineligible for [facility] CM program.               Will close encounter.

## 2018-08-21 NOTE — PROGRESS NOTES
End of shift report given to Bethany Wells RN. In bed, resting quietly, NAD. Pt safety maintained throughout shift. No Residual Tumor Seen Histology Text: There were no malignant cells seen in the sections examined.

## 2021-01-02 ENCOUNTER — NURSE TRIAGE (OUTPATIENT)
Dept: OTHER | Facility: CLINIC | Age: 86
End: 2021-01-02

## 2021-01-02 NOTE — TELEPHONE ENCOUNTER
Patient's son called in via [address] [place] [address],[address] to report having symptoms of cough and shortness of breath. Reason for Disposition   MODERATE difficulty breathing (e.g., speaks in phrases, SOB even at rest, pulse 100-120)    Answer Assessment - Initial Assessment Questions  1. COVID-19 DIAGNOSIS: \"Who made your Coronavirus (COVID-19) diagnosis? \" \"Was it confirmed by a positive lab test?\" If not diagnosed by a HCP, ask \"Are there lots of cases (community spread) where you live? \" (See public health department website, if unsure)      N/A    2. COVID-19 EXPOSURE: \"Was there any known exposure to Pete before the symptoms began? \" CDC Definition of close contact: within 6 feet (2 meters) for a total of 15 minutes or more over a 24-hour period. Son and d-I-l    3. ONSET: \"When did the COVID-19 symptoms start? \"       Last couple days    4. WORST SYMPTOM: \"What is your worst symptom? \" (e.g., cough, fever, shortness of breath, muscle aches)      Shortness of breath    5. COUGH: \"Do you have a cough? \" If so, ask: \"How bad is the cough? \"        Yes    6. FEVER: \"Do you have a fever? \" If so, ask: \"What is your temperature, how was it measured, and when did it start? \"      Unsure    7. RESPIRATORY STATUS: \"Describe your breathing? \" (e.g., shortness of breath, wheezing, unable to speak)       Difficulty speaking    8. BETTER-SAME-WORSE: Dina Bowen you getting better, staying the same or getting worse compared to yesterday? \"  If getting worse, ask, \"In what way? \"      Worse    9. HIGH RISK DISEASE: \"Do you have any chronic medical problems? \" (e.g., asthma, heart or lung disease, weak immune system, obesity, etc.)      Yes, heart disease and age    8. PREGNANCY: \"Is there any chance you are pregnant? \" \"When was your last menstrual period? \"        No    11. OTHER SYMPTOMS: \"Do you have any other symptoms? \"  (e.g., chills, fatigue, headache, loss of smell or taste, muscle pain, sore throat; new loss of smell or taste especially support the diagnosis of COVID-19)        no    Protocols used: CORONAVIRUS (COVID-19) DIAGNOSED OR SUSPECTED-ADULT-AH    Informed of disposition. Care advice as documented. Caller verbalized understanding and denies any further questions/concerns. Attention provider: Your patient utilized nurse triage services offered by an employer, payer or community. This encounter includes an overview of the reason for call, assessment and recommended disposition. Please do not respond through this encounter as the response is not directed to a shared pool.

## 2021-01-03 ENCOUNTER — HOSPITAL ENCOUNTER (INPATIENT)
Age: 86
LOS: 9 days | Discharge: HOSPICE | DRG: 177 | End: 2021-01-13
Attending: EMERGENCY MEDICINE | Admitting: FAMILY MEDICINE
Payer: MEDICARE

## 2021-01-03 ENCOUNTER — APPOINTMENT (OUTPATIENT)
Dept: GENERAL RADIOLOGY | Age: 86
DRG: 177 | End: 2021-01-03
Attending: EMERGENCY MEDICINE
Payer: MEDICARE

## 2021-01-03 DIAGNOSIS — N39.0 URINARY TRACT INFECTION ASSOCIATED WITH INDWELLING URETHRAL CATHETER, INITIAL ENCOUNTER (HCC): Primary | ICD-10-CM

## 2021-01-03 DIAGNOSIS — T83.511A URINARY TRACT INFECTION ASSOCIATED WITH INDWELLING URETHRAL CATHETER, INITIAL ENCOUNTER (HCC): Primary | ICD-10-CM

## 2021-01-03 DIAGNOSIS — R63.0 ANOREXIA: ICD-10-CM

## 2021-01-03 DIAGNOSIS — R53.81 DEBILITY: Chronic | ICD-10-CM

## 2021-01-03 DIAGNOSIS — Z51.5 ENCOUNTER FOR PALLIATIVE CARE: ICD-10-CM

## 2021-01-03 DIAGNOSIS — U07.1 COVID-19 VIRUS INFECTION: ICD-10-CM

## 2021-01-03 DIAGNOSIS — F03.90 DEMENTIA WITHOUT BEHAVIORAL DISTURBANCE, UNSPECIFIED DEMENTIA TYPE: ICD-10-CM

## 2021-01-03 LAB
ALBUMIN SERPL-MCNC: 2.9 G/DL (ref 3.2–4.6)
ALBUMIN/GLOB SERPL: 0.7 {RATIO} (ref 1.2–3.5)
ALP SERPL-CCNC: 77 U/L (ref 50–136)
ALT SERPL-CCNC: 14 U/L (ref 12–65)
ANION GAP SERPL CALC-SCNC: 2 MMOL/L (ref 7–16)
AST SERPL-CCNC: 60 U/L (ref 15–37)
BASOPHILS # BLD: 0 K/UL (ref 0–0.2)
BASOPHILS NFR BLD: 0 % (ref 0–2)
BILIRUB SERPL-MCNC: 0.3 MG/DL (ref 0.2–1.1)
BUN SERPL-MCNC: 16 MG/DL (ref 8–23)
CALCIUM SERPL-MCNC: 8.3 MG/DL (ref 8.3–10.4)
CHLORIDE SERPL-SCNC: 104 MMOL/L (ref 98–107)
CO2 SERPL-SCNC: 28 MMOL/L (ref 21–32)
CREAT SERPL-MCNC: 0.82 MG/DL (ref 0.6–1)
DIFFERENTIAL METHOD BLD: ABNORMAL
EOSINOPHIL # BLD: 0 K/UL (ref 0–0.8)
EOSINOPHIL NFR BLD: 0 % (ref 0.5–7.8)
ERYTHROCYTE [DISTWIDTH] IN BLOOD BY AUTOMATED COUNT: 13.7 % (ref 11.9–14.6)
GLOBULIN SER CALC-MCNC: 4.1 G/DL (ref 2.3–3.5)
GLUCOSE SERPL-MCNC: 133 MG/DL (ref 65–100)
HCT VFR BLD AUTO: 44.3 % (ref 35.8–46.3)
HGB BLD-MCNC: 14.6 G/DL (ref 11.7–15.4)
IMM GRANULOCYTES # BLD AUTO: 0 K/UL (ref 0–0.5)
IMM GRANULOCYTES NFR BLD AUTO: 0 % (ref 0–5)
LACTATE SERPL-SCNC: 1.5 MMOL/L (ref 0.4–2)
LYMPHOCYTES # BLD: 1 K/UL (ref 0.5–4.6)
LYMPHOCYTES NFR BLD: 15 % (ref 13–44)
MCH RBC QN AUTO: 30.5 PG (ref 26.1–32.9)
MCHC RBC AUTO-ENTMCNC: 33 G/DL (ref 31.4–35)
MCV RBC AUTO: 92.5 FL (ref 79.6–97.8)
MONOCYTES # BLD: 0.8 K/UL (ref 0.1–1.3)
MONOCYTES NFR BLD: 12 % (ref 4–12)
NEUTS SEG # BLD: 5 K/UL (ref 1.7–8.2)
NEUTS SEG NFR BLD: 73 % (ref 43–78)
NRBC # BLD: 0 K/UL (ref 0–0.2)
PLATELET # BLD AUTO: 251 K/UL (ref 150–450)
PMV BLD AUTO: 13 FL (ref 9.4–12.3)
POTASSIUM SERPL-SCNC: 3.8 MMOL/L (ref 3.5–5.1)
POTASSIUM SERPL-SCNC: 5.2 MMOL/L (ref 3.5–5.1)
POTASSIUM SERPL-SCNC: 6.4 MMOL/L (ref 3.5–5.1)
POTASSIUM SERPL-SCNC: >10 MMOL/L (ref 3.5–5.1)
PROCALCITONIN SERPL-MCNC: 0.14 NG/ML
PROT SERPL-MCNC: 7 G/DL (ref 6.3–8.2)
RBC # BLD AUTO: 4.79 M/UL (ref 4.05–5.2)
SODIUM SERPL-SCNC: 134 MMOL/L (ref 136–145)
WBC # BLD AUTO: 6.9 K/UL (ref 4.3–11.1)

## 2021-01-03 PROCEDURE — 93005 ELECTROCARDIOGRAM TRACING: CPT | Performed by: EMERGENCY MEDICINE

## 2021-01-03 PROCEDURE — 96365 THER/PROPH/DIAG IV INF INIT: CPT

## 2021-01-03 PROCEDURE — 80053 COMPREHEN METABOLIC PANEL: CPT

## 2021-01-03 PROCEDURE — 87040 BLOOD CULTURE FOR BACTERIA: CPT

## 2021-01-03 PROCEDURE — 84145 PROCALCITONIN (PCT): CPT

## 2021-01-03 PROCEDURE — 83605 ASSAY OF LACTIC ACID: CPT

## 2021-01-03 PROCEDURE — 87086 URINE CULTURE/COLONY COUNT: CPT

## 2021-01-03 PROCEDURE — 81003 URINALYSIS AUTO W/O SCOPE: CPT

## 2021-01-03 PROCEDURE — 85025 COMPLETE CBC W/AUTO DIFF WBC: CPT

## 2021-01-03 PROCEDURE — 71045 X-RAY EXAM CHEST 1 VIEW: CPT

## 2021-01-03 PROCEDURE — 87635 SARS-COV-2 COVID-19 AMP PRB: CPT

## 2021-01-03 PROCEDURE — 74011250636 HC RX REV CODE- 250/636: Performed by: EMERGENCY MEDICINE

## 2021-01-03 PROCEDURE — 99285 EMERGENCY DEPT VISIT HI MDM: CPT

## 2021-01-03 PROCEDURE — 81015 MICROSCOPIC EXAM OF URINE: CPT

## 2021-01-03 PROCEDURE — 74011000258 HC RX REV CODE- 258: Performed by: EMERGENCY MEDICINE

## 2021-01-03 PROCEDURE — 84132 ASSAY OF SERUM POTASSIUM: CPT

## 2021-01-03 PROCEDURE — 36415 COLL VENOUS BLD VENIPUNCTURE: CPT

## 2021-01-03 RX ORDER — SODIUM CHLORIDE, SODIUM LACTATE, POTASSIUM CHLORIDE, CALCIUM CHLORIDE 600; 310; 30; 20 MG/100ML; MG/100ML; MG/100ML; MG/100ML
1000 INJECTION, SOLUTION INTRAVENOUS CONTINUOUS
Status: DISCONTINUED | OUTPATIENT
Start: 2021-01-03 | End: 2021-01-04

## 2021-01-03 RX ADMIN — SODIUM CHLORIDE, SODIUM LACTATE, POTASSIUM CHLORIDE, AND CALCIUM CHLORIDE 1000 ML/HR: 600; 310; 30; 20 INJECTION, SOLUTION INTRAVENOUS at 22:23

## 2021-01-03 RX ADMIN — SODIUM CHLORIDE, SODIUM LACTATE, POTASSIUM CHLORIDE, AND CALCIUM CHLORIDE 1000 ML: 600; 310; 30; 20 INJECTION, SOLUTION INTRAVENOUS at 20:47

## 2021-01-03 RX ADMIN — CEFTRIAXONE SODIUM 1 G: 1 INJECTION, POWDER, FOR SOLUTION INTRAMUSCULAR; INTRAVENOUS at 21:13

## 2021-01-04 PROBLEM — U07.1 COVID-19 VIRUS INFECTION: Status: ACTIVE | Noted: 2021-01-04

## 2021-01-04 PROBLEM — R91.8 PULMONARY INFILTRATE: Status: RESOLVED | Noted: 2017-02-05 | Resolved: 2021-01-04

## 2021-01-04 PROBLEM — K20.90 ESOPHAGITIS DETERMINED BY ENDOSCOPY: Status: RESOLVED | Noted: 2017-02-07 | Resolved: 2021-01-04

## 2021-01-04 PROBLEM — R60.0 BILATERAL LEG EDEMA: Status: RESOLVED | Noted: 2017-02-06 | Resolved: 2021-01-04

## 2021-01-04 PROBLEM — N39.0 UTI (URINARY TRACT INFECTION): Status: ACTIVE | Noted: 2021-01-04

## 2021-01-04 PROBLEM — J96.00 ACUTE RESPIRATORY FAILURE (HCC): Status: RESOLVED | Noted: 2017-02-14 | Resolved: 2021-01-04

## 2021-01-04 LAB
25(OH)D3 SERPL-MCNC: 8.5 NG/ML (ref 30–100)
ABO + RH BLD: NORMAL
ATRIAL RATE: 83 BPM
BACTERIA URNS QL MICRO: ABNORMAL /HPF
BLOOD GROUP ANTIBODIES SERPL: NORMAL
CALCULATED P AXIS, ECG09: 74 DEGREES
CALCULATED R AXIS, ECG10: -56 DEGREES
CALCULATED T AXIS, ECG11: 66 DEGREES
CASTS URNS QL MICRO: 0 /LPF
COVID-19 RAPID TEST, COVR: DETECTED
CRYSTALS URNS QL MICRO: 0 /LPF
D DIMER PPP FEU-MCNC: 0.94 UG/ML(FEU)
DIAGNOSIS, 93000: NORMAL
EPI CELLS #/AREA URNS HPF: ABNORMAL /HPF
FLUAV AG NPH QL IA: NEGATIVE
FLUBV AG NPH QL IA: NEGATIVE
LACTATE SERPL-SCNC: 1 MMOL/L (ref 0.4–2)
LACTATE SERPL-SCNC: 2.5 MMOL/L (ref 0.4–2)
MUCOUS THREADS URNS QL MICRO: ABNORMAL /LPF
OTHER OBSERVATIONS,UCOM: ABNORMAL
P-R INTERVAL, ECG05: 190 MS
Q-T INTERVAL, ECG07: 378 MS
QRS DURATION, ECG06: 62 MS
QTC CALCULATION (BEZET), ECG08: 444 MS
RBC #/AREA URNS HPF: ABNORMAL /HPF
SOURCE, COVRS: ABNORMAL
SPECIMEN EXP DATE BLD: NORMAL
SPECIMEN SOURCE: NORMAL
VENTRICULAR RATE, ECG03: 83 BPM
WBC URNS QL MICRO: >100 /HPF

## 2021-01-04 PROCEDURE — 74011250636 HC RX REV CODE- 250/636: Performed by: FAMILY MEDICINE

## 2021-01-04 PROCEDURE — 36415 COLL VENOUS BLD VENIPUNCTURE: CPT

## 2021-01-04 PROCEDURE — 74011250636 HC RX REV CODE- 250/636: Performed by: EMERGENCY MEDICINE

## 2021-01-04 PROCEDURE — 65270000029 HC RM PRIVATE

## 2021-01-04 PROCEDURE — 87804 INFLUENZA ASSAY W/OPTIC: CPT

## 2021-01-04 PROCEDURE — 74011000258 HC RX REV CODE- 258: Performed by: FAMILY MEDICINE

## 2021-01-04 PROCEDURE — 74011250637 HC RX REV CODE- 250/637: Performed by: FAMILY MEDICINE

## 2021-01-04 PROCEDURE — 74011000250 HC RX REV CODE- 250: Performed by: FAMILY MEDICINE

## 2021-01-04 PROCEDURE — 85379 FIBRIN DEGRADATION QUANT: CPT

## 2021-01-04 PROCEDURE — 86901 BLOOD TYPING SEROLOGIC RH(D): CPT

## 2021-01-04 PROCEDURE — 83605 ASSAY OF LACTIC ACID: CPT

## 2021-01-04 PROCEDURE — 82306 VITAMIN D 25 HYDROXY: CPT

## 2021-01-04 RX ORDER — ONDANSETRON 2 MG/ML
4 INJECTION INTRAMUSCULAR; INTRAVENOUS
Status: DISCONTINUED | OUTPATIENT
Start: 2021-01-04 | End: 2021-01-13 | Stop reason: HOSPADM

## 2021-01-04 RX ORDER — BISACODYL 5 MG
5 TABLET, DELAYED RELEASE (ENTERIC COATED) ORAL DAILY PRN
Status: DISCONTINUED | OUTPATIENT
Start: 2021-01-04 | End: 2021-01-13 | Stop reason: HOSPADM

## 2021-01-04 RX ORDER — ACETAMINOPHEN 325 MG/1
650 TABLET ORAL
Status: DISCONTINUED | OUTPATIENT
Start: 2021-01-04 | End: 2021-01-13 | Stop reason: HOSPADM

## 2021-01-04 RX ORDER — ASCORBIC ACID 500 MG
500 TABLET ORAL 2 TIMES DAILY
Status: DISCONTINUED | OUTPATIENT
Start: 2021-01-04 | End: 2021-01-07

## 2021-01-04 RX ORDER — MELATONIN
1000 DAILY
Status: DISCONTINUED | OUTPATIENT
Start: 2021-01-05 | End: 2021-01-07

## 2021-01-04 RX ORDER — DEXAMETHASONE SODIUM PHOSPHATE 100 MG/10ML
6 INJECTION INTRAMUSCULAR; INTRAVENOUS EVERY 24 HOURS
Status: DISCONTINUED | OUTPATIENT
Start: 2021-01-04 | End: 2021-01-05

## 2021-01-04 RX ORDER — ENOXAPARIN SODIUM 100 MG/ML
30 INJECTION SUBCUTANEOUS EVERY 12 HOURS
Status: DISCONTINUED | OUTPATIENT
Start: 2021-01-04 | End: 2021-01-13 | Stop reason: HOSPADM

## 2021-01-04 RX ORDER — BUSPIRONE HYDROCHLORIDE 5 MG/1
5 TABLET ORAL DAILY
Status: DISCONTINUED | OUTPATIENT
Start: 2021-01-04 | End: 2021-01-04

## 2021-01-04 RX ORDER — SODIUM CHLORIDE 9 MG/ML
250 INJECTION, SOLUTION INTRAVENOUS AS NEEDED
Status: DISCONTINUED | OUTPATIENT
Start: 2021-01-04 | End: 2021-01-13 | Stop reason: HOSPADM

## 2021-01-04 RX ORDER — UREA 10 %
220 LOTION (ML) TOPICAL DAILY
Status: DISCONTINUED | OUTPATIENT
Start: 2021-01-04 | End: 2021-01-07

## 2021-01-04 RX ORDER — SODIUM CHLORIDE 0.9 % (FLUSH) 0.9 %
5-40 SYRINGE (ML) INJECTION EVERY 8 HOURS
Status: DISCONTINUED | OUTPATIENT
Start: 2021-01-04 | End: 2021-01-13 | Stop reason: HOSPADM

## 2021-01-04 RX ORDER — SODIUM CHLORIDE 0.9 % (FLUSH) 0.9 %
5-40 SYRINGE (ML) INJECTION AS NEEDED
Status: DISCONTINUED | OUTPATIENT
Start: 2021-01-04 | End: 2021-01-13 | Stop reason: HOSPADM

## 2021-01-04 RX ORDER — ACETAMINOPHEN 650 MG/1
650 SUPPOSITORY RECTAL
Status: DISCONTINUED | OUTPATIENT
Start: 2021-01-04 | End: 2021-01-13 | Stop reason: HOSPADM

## 2021-01-04 RX ORDER — HYDROCODONE BITARTRATE AND HOMATROPINE METHYLBROMIDE 1.5; 5 MG/5ML; MG/5ML
5 SYRUP ORAL
Status: DISCONTINUED | OUTPATIENT
Start: 2021-01-04 | End: 2021-01-04

## 2021-01-04 RX ADMIN — METHYLPREDNISOLONE SODIUM SUCCINATE 40 MG: 40 INJECTION, POWDER, FOR SOLUTION INTRAMUSCULAR; INTRAVENOUS at 03:54

## 2021-01-04 RX ADMIN — Medication 10 ML: at 22:20

## 2021-01-04 RX ADMIN — DEXAMETHASONE SODIUM PHOSPHATE 6 MG: 10 INJECTION INTRAMUSCULAR; INTRAVENOUS at 17:50

## 2021-01-04 RX ADMIN — ENOXAPARIN SODIUM 30 MG: 30 INJECTION SUBCUTANEOUS at 05:19

## 2021-01-04 RX ADMIN — METHYLPREDNISOLONE SODIUM SUCCINATE 40 MG: 40 INJECTION, POWDER, FOR SOLUTION INTRAMUSCULAR; INTRAVENOUS at 10:03

## 2021-01-04 RX ADMIN — CEFTRIAXONE SODIUM 1 G: 1 INJECTION, POWDER, FOR SOLUTION INTRAMUSCULAR; INTRAVENOUS at 20:40

## 2021-01-04 RX ADMIN — REMDESIVIR 200 MG: 100 INJECTION, POWDER, LYOPHILIZED, FOR SOLUTION INTRAVENOUS at 22:20

## 2021-01-04 RX ADMIN — Medication 10 ML: at 05:19

## 2021-01-04 RX ADMIN — AZITHROMYCIN MONOHYDRATE 500 MG: 500 INJECTION, POWDER, LYOPHILIZED, FOR SOLUTION INTRAVENOUS at 00:39

## 2021-01-04 RX ADMIN — AZITHROMYCIN MONOHYDRATE 500 MG: 500 INJECTION, POWDER, LYOPHILIZED, FOR SOLUTION INTRAVENOUS at 23:50

## 2021-01-04 RX ADMIN — Medication 5 ML: at 13:04

## 2021-01-04 RX ADMIN — Medication 220 MG: at 10:03

## 2021-01-04 RX ADMIN — OXYCODONE HYDROCHLORIDE AND ACETAMINOPHEN 500 MG: 500 TABLET ORAL at 17:49

## 2021-01-04 RX ADMIN — OXYCODONE HYDROCHLORIDE AND ACETAMINOPHEN 500 MG: 500 TABLET ORAL at 10:03

## 2021-01-04 RX ADMIN — ENOXAPARIN SODIUM 30 MG: 30 INJECTION SUBCUTANEOUS at 17:50

## 2021-01-04 NOTE — PROGRESS NOTES
Physician Progress Note      PATIENT:               Magdiel Barton  CSN #:                  [id number]  :                       1934  ADMIT DATE:       1/3/2021 7:56 PM  DISCH DATE:  RESPONDING  PROVIDER #:        Song SANON DO          QUERY TEXT:    Pt admitted with COVID. Pt noted to use 4L N/C  oxygen around the clock at home. If possible, please document in the progress notes and discharge summary if you are evaluating and/or treating any of the following: The medical record reflects the following:  Risk Factors: 86 yr, chronic use of home o2 at 4L n/c  Clinical Indicators: use of oxygen at home dose of 4L n/c  Treatment:   oxygen 4L n/c  Options provided:  -- Chronic respiratory failure with hypoxia  -- Chronic respiratory failure with hypercapnia  -- Other - I will add my own diagnosis  -- Disagree - Not applicable / Not valid  -- Disagree - Clinically unable to determine / Unknown  -- Refer to Clinical Documentation Reviewer    PROVIDER RESPONSE TEXT:    This patient has chronic respiratory failure with hypoxia.     Query created by: Carissa Doty on 2021 9:57 AM      Electronically signed by:  Song SANON DO 2021 2:08 PM

## 2021-01-04 NOTE — ROUTINE PROCESS
TRANSFER - OUT REPORT: 
 
Verbal report given to srini(name) on Alberto May  being transferred to med/surg(unit) for routine progression of care Report consisted of patients Situation, Background, Assessment and  
Recommendations(SBAR). Information from the following report(s) SBAR was reviewed with the receiving nurse. Lines:  
Peripheral IV 01/03/21 Right Forearm (Active) Site Assessment Clean, dry, & intact 01/03/21 2041 Phlebitis Assessment 0 01/03/21 2041 Infiltration Assessment 0 01/03/21 2041 Dressing Status Clean, dry, & intact 01/03/21 2041 Hub Color/Line Status Pink 01/03/21 2041 Opportunity for questions and clarification was provided. Patient transported with: 
 O2 @ 4 liters

## 2021-01-04 NOTE — PROGRESS NOTES
Care Management Interventions  PCP Verified by CM: Yes  Transition of Care Consult (CM Consult): [place]: No  Reason Outside [place]: Patient already serviced by other home care/hospice agency  Current Support Network: Relative's Home  Confirm Follow Up Transport: Family  Freedom of Choice List was Provided with Basic Dialogue that Supports the Patient's Individualized Plan of Care/Goals, Treatment Preferences and Shares the Quality Data Associated with the Providers?: Yes  [employer] Information Provided?: No  Discharge Location  Discharge Placement: Home with hospice  CM called patient's son to discuss discharge plans. Patient has been with [facility] for 4 years. She's bedridden. Uses home 02 as needed according to patient's son. Patient has a caregiver that comes into the home 7 days a week from 9-5 and the son is with patient at night. Son is not sure if he wants patient to discharge home with hospice care. He wants to wait to see how patient is doing at discharge. CM following.

## 2021-01-04 NOTE — ED PROVIDER NOTES
49-year-old female sent from home by family members  Multiple family members positive for Covid. EMS reports the patient was recently tested and the results were positive as well. Patient has a history of chronic shortness of breath. She is on 4 L nasal cannula oxygen around-the-clock  Patient has advanced dementia and although being awake does not communicate  Patient allegedly has DNR status but I do not have paperwork to documents this  Patient also has an indwelling Maravilla catheter    The history is provided by the EMS personnel.    Positive For Covid-19  Temp source:  Unable to specify  Severity:  Unable to specify  Timing:  Intermittent  Progression:  Waxing and waning  Chronicity:  Recurrent  Relieved by:  None tried  Worsened by:  Nothing  Ineffective treatments:  None tried  Associated symptoms: confusion    Associated symptoms: no vomiting         Past Medical History:   Diagnosis Date    Arrhythmia     by history/ not at present    CAD (coronary artery disease)     mitral valve prolapse    Chronic pain     Gastrointestinal disorder     GERD    Gastrointestinal disorder     gallstones    GERD (gastroesophageal reflux disease)        Past Surgical History:   Procedure Laterality Date    BREAST SURGERY PROCEDURE UNLISTED      lumpectomy on RT breast    FLEXIBLE SIGMOIDOSCOPY N/A 2/7/2017    [facility]    HX HYSTERECTOMY           Family History:   Problem Relation Age of Onset    Diabetes Mother     Hypertension Mother     Breast Cancer Neg Hx        Social History     Socioeconomic History    Marital status:      Spouse name: Not on file    Number of children: Not on file    Years of education: Not on file    Highest education level: Not on file   Occupational History    Not on file   Social Needs    Financial resource strain: Not on file    Food insecurity     Worry: Not on file     Inability: Not on file   [facility] Transportation needs     Medical: Not on file     Non-medical: Not on file   Tobacco Use    Smoking status: Never Smoker   Substance and Sexual Activity    Alcohol use: No    Drug use: No    Sexual activity: Not Currently   Lifestyle    Physical activity     Days per week: Not on file     Minutes per session: Not on file    Stress: Not on file   Relationships    Social connections     Talks on phone: Not on file     Gets together: Not on file     Attends [employer] service: Not on file     Active member of club or organization: Not on file     Attends meetings of clubs or organizations: Not on file     Relationship status: Not on file    Intimate partner violence     Fear of current or ex partner: Not on file     Emotionally abused: Not on file     Physically abused: Not on file     Forced sexual activity: Not on file   Other Topics Concern    Not on file   Social History Narrative    Not on file         ALLERGIES: Aspirin and Pcn [penicillins]    Review of Systems   Unable to perform ROS: Dementia   Gastrointestinal: Negative for vomiting. Psychiatric/Behavioral: Positive for confusion. Vitals:    01/03/21 1940 01/03/21 2003   BP: 112/76 (!) 161/89   Pulse: 84 84   Resp: 16 15   Temp: 97.4 °F (36.3 °C) 98.9 °F (37.2 °C)   SpO2: 98% 98%            Physical Exam  Vitals signs and nursing note reviewed. Exam conducted with a chaperone present. Constitutional:       General: She is in acute distress. HENT:      Head: Normocephalic and atraumatic. Mouth/Throat:      Mouth: Mucous membranes are moist.   Eyes:      Extraocular Movements: Extraocular movements intact. Conjunctiva/sclera: Conjunctivae normal.      Pupils: Pupils are equal, round, and reactive to light. Cardiovascular:      Rate and Rhythm: Normal rate. Pulses: Normal pulses. Heart sounds: Normal heart sounds. Pulmonary:      Effort: Pulmonary effort is normal. No respiratory distress.       Breath sounds: Normal breath sounds. Abdominal:      General: Abdomen is flat. There is no distension. Palpations: Abdomen is soft. Tenderness: There is no abdominal tenderness. Musculoskeletal:      Right lower leg: Edema present. Left lower leg: Edema present. Skin:     General: Skin is warm and dry. Capillary Refill: Capillary refill takes less than 2 seconds. Neurological:      General: No focal deficit present. Mental Status: She is alert. She is disoriented. Psychiatric:         Mood and Affect: Affect is blunt and flat. Speech: She is noncommunicative. Behavior: Behavior is slowed. Cognition and Memory: Memory is impaired. MDM  Number of Diagnoses or Management Options  COVID-19 virus infection: new and requires workup  Dementia without behavioral disturbance, unspecified dementia type ([facility] [address]): established and worsening  Urinary tract infection associated with indwelling urethral catheter, initial encounter [facility]): new and requires workup  Diagnosis management comments: 68-year-old female sent from home with concerns over recently diagnosed COVID-19 infection  Patient had multiple exposures to with positive contacts  Patient does have home health that helps assist with her care as well.    8:58 PM  EKG reviewed  Sinus rhythm  Left axis deviation  No ectopy  No acute ischemic changes   previous EKG reviewed from February 2017--patient in atrial fibrillation at that time. 12:09 AM  Cruz swab positive tonight. Patient has UTI. The catheter was changed prior to obtaining specimen.     Patient will be admitted to the hospital service       Amount and/or Complexity of Data Reviewed  Clinical lab tests: ordered and reviewed  Tests in the radiology section of CPT®: ordered and reviewed  Tests in the medicine section of CPT®: ordered and reviewed  Decide to obtain previous medical records or to obtain history from someone other than the patient: yes  Review and summarize past medical records: yes  Discuss the patient with other providers: yes  Independent visualization of images, tracings, or specimens: yes    Risk of Complications, Morbidity, and/or Mortality  Presenting problems: moderate  Diagnostic procedures: moderate  Management options: moderate  General comments: Elements of this note have been dictated via voice recognition software. Text and phrases may be limited by the accuracy of the software. The chart has been reviewed, but errors may still be present.       Patient Progress  Patient progress: stable         Procedures

## 2021-01-04 NOTE — ED TRIAGE NOTES
Patient presents to ED via [facility]. Per EMS patient received positive Covid test yesterday. Family states that patient has not been eating or drinking well for 4 days. Finished Bactrim on Wednesday for UTI. Family is also Covid positive. Wears 4L oxygen chronically.

## 2021-01-04 NOTE — PROGRESS NOTES
81 yo admitted after midnight for COVID, UTI, inability to eat or drink. Hx of recent need of 4L NC at home due to COVID, chronic Maravilla (changed 1/3) placed 6 weeks ago, pulmonary HTN RV pressure 60-65mmHg, dementia, osteoporosis. Influenza negative. COVID +. PE- does not speak. Sleeping. Heart - RRR, distant heart sounds  Lungs - CTA bilaterally with poor inspiratory effort and limited breathe sounds  extremities  No edema     On 4L NC.     COVID- Vitamin C, Zinc. Decadron. Will not be able to use albuterol due to dementia. I have discussed convalescent plasma and remdesivir. Son is interested in giving both to his mother. He understands potential ZACH and liver damage along with transfusion reaction. Azithromycin and Ceftriaxone in case of bacterial pneumonia. UTI - urine culture with no growth to date. On Ceftriaxone. Hyperkalemia - Resolved. Poor PO intake - Holding IV fluids today but if does not wake up may need gentle hydration. Received 1L bolus in ER    Pulmonary HTN- monitor volume status, likely will volume overload easily. Dementia - totally bedridden. Normally able to speak but hard of hearing. Has to have 1:1 feeding with chopped foods. Son states he has noted that she has been choking on food lately so will make purred diet. Son agrees to DNR     Son would like a call every day if possible.      DVT prophylaxis - Lovenox

## 2021-01-04 NOTE — H&P
HOSPITALIST H&P  NAME:  Kristie Jenkins   Age:  80 y.o.  :   1934   MRN:   [id number]  PCP: iRka Ma MD  Treatment Team: Attending Provider: Catalina Lundborg, MD; Primary Nurse: Felisa Soriano    Prior     CC: Reason for admission is: Covid and UTI    HPI:   Patient history was obtained from the ER provider prior to seeing the patient. Patient is a 80 y.o. female who presents to the ER from home due to positive Covid test and associated symptoms. Several family members are Covid positive. Family reported to the ER the patient's not been eating or drinking well for about 4 days. She actually was on Bactrim for a UTI last week. She wears oxygen at 4 L/min chronically. Patient has advanced dementia and is nonverbal.  My history is obtained from the ER staff and records. ER work-up shows her to also have a urinary tract infection. ROS:  Pt unable to answer due to dementia and/or altered mental status.       Past Medical History:   Diagnosis Date    Arrhythmia     by history/ not at present    CAD (coronary artery disease)     mitral valve prolapse    Chronic pain     Esophagitis determined by endoscopy 2017    Gastrointestinal disorder     GERD    Gastrointestinal disorder     gallstones    GERD (gastroesophageal reflux disease)       Past Surgical History:   Procedure Laterality Date    FLEXIBLE SIGMOIDOSCOPY N/A 2017    SIGMOIDOSCOPY FLEXIBLE at bedside performed by Rakel Hern[facility]dez MD at [facility] ENDOSCOPY    HX HYSTERECTOMY      NC BREAST SURGERY PROCEDURE UNLISTED      lumpectomy on RT breast      Social History     Tobacco Use    Smoking status: Never Smoker   Substance Use Topics    Alcohol use: No      Family History   Problem Relation Age of Onset    Diabetes Mother     Hypertension Mother     Breast Cancer Neg Hx        FH Reviewed and non-contributory to admitting diagnosis    Allergies   Allergen Reactions    Aspirin Nausea and Vomiting    Pcn [Penicillins] Itching      Prior to Admission Medications   Prescriptions Last Dose Informant Patient Reported? Taking? HYDROcodone-homatropine (HYCODAN) 5-1.5 mg/5 mL (5 mL) syrup   No No   Sig: Take 5 mL by mouth every four (4) hours as needed for Cough. Max Daily Amount: 30 mL.   busPIRone (BUSPAR) 5 mg tablet   No No   Sig: Take 1 Tab by mouth daily. furosemide (LASIX) 20 mg tablet 1/3/2021 at Unknown time  Yes Yes   Sig: Take 20 mg by mouth daily. hydrocortisone (ANUSOL-HC) 2.5 % rectal cream   No No   Sig: Insert 1 g into rectum two (2) times a day. metoclopramide HCl (REGLAN) 5 mg tablet   No No   Sig: Take 1 Tab by mouth every six (6) hours as needed for Nausea. potassium chloride SR (KLOR-CON 10) 10 mEq tablet   Yes No   Sig: Take 10 mEq by mouth daily. sucralfate (CARAFATE) 100 mg/mL suspension   Yes No   Sig: Take 1 tsp by mouth four (4) times daily as needed. sucralfate (CARAFATE) 100 mg/mL suspension   No No   Sig: Take 10 mL by mouth Before breakfast, lunch, dinner and at bedtime. Facility-Administered Medications: None         Objective:     No intake or output data in the 24 hours ending 21 0038   Temp (24hrs), Av.2 °F (36.8 °C), Min:97.4 °F (36.3 °C), Max:98.9 °F (37.2 °C)    Oxygen Therapy  O2 Sat (%): 100 % (21)  Pulse via Oximetry: 72 beats per minute (21)  O2 Device: Nasal cannula (21)  O2 Flow Rate (L/min): 4 l/min (21)   Body mass index is 23.03 kg/m². Patient Vitals for the past 24 hrs:   Temp Pulse Resp BP SpO2   21 0016    (!) 160/68 100 %   21 2145  75 23 (!) 157/65 100 %   21 2045  87  (!) 166/72 98 %   21 98.9 °F (37.2 °C) 84 15 (!) 161/89 98 %   21 97.4 °F (36.3 °C) 84 16 112/76 98 %     Physical Exam:    General:    WD and WN, No apparent distress. Head:   Normocephalic, without obvious abnormality, atraumatic.   Eyes:  PERRL; EOMI; sclera normal/non-icteric  ENT:  Hearing is normal.  Oropharynx is clear with tacky mucous membranes   Resp:    Clear/diminished to auscultation bilaterally. No Wheezing or Rhonchi. Resp are even and unlabored  Heart[de-identified]  Regular rate and rhythm,  no murmur,   1+ LE edema  Abdomen:   Soft, non-tender. Not distended. Bowel sounds normal.  hepato-splenomegaly-none  Musc/SK: Muscle strength is good and tone normal; No cyanosis. No clubbing  Skin:     Texture, turgor normal. No significant rashes or lesions. Capillary refill < 2 sec  Neurologic: CN II - XII are grossly intact - Eye exam as noted above  Psych: Awake but nonverbal due to dementia;  Judgement and insight are impaired     Data Review:   Recent Results (from the past 24 hour(s))   METABOLIC PANEL, COMPREHENSIVE    Collection Time: 01/03/21  8:44 PM   Result Value Ref Range    Sodium 134 (L) 136 - 145 mmol/L    Potassium 6.4 (HH) 3.5 - 5.1 mmol/L    Chloride 104 98 - 107 mmol/L    CO2 28 21 - 32 mmol/L    Anion gap 2 (L) 7 - 16 mmol/L    Glucose 133 (H) 65 - 100 mg/dL    BUN 16 8 - 23 MG/DL    Creatinine 0.82 0.6 - 1.0 MG/DL    GFR est AA >60 >60 ml/min/1.73m2    GFR est non-AA >60 >60 ml/min/1.73m2    Calcium 8.3 8.3 - 10.4 MG/DL    Bilirubin, total 0.3 0.2 - 1.1 MG/DL    ALT (SGPT) 14 12 - 65 U/L    AST (SGOT) 60 (H) 15 - 37 U/L    Alk.  phosphatase 77 50 - 136 U/L    Protein, total 7.0 6.3 - 8.2 g/dL    Albumin 2.9 (L) 3.2 - 4.6 g/dL    Globulin 4.1 (H) 2.3 - 3.5 g/dL    A-G Ratio 0.7 (L) 1.2 - 3.5     LACTIC ACID    Collection Time: 01/03/21  8:44 PM   Result Value Ref Range    Lactic acid 1.5 0.4 - 2.0 MMOL/L   PROCALCITONIN    Collection Time: 01/03/21  8:44 PM   Result Value Ref Range    Procalcitonin 0.14 ng/mL   SARS-COV-2    Collection Time: 01/03/21  8:47 PM   Result Value Ref Range    Specimen source Nasopharyngeal      COVID-19 rapid test Detected (AA) NOTD      SARS CoV-2 PENDING    CBC WITH AUTOMATED DIFF    Collection Time: 01/03/21 10:06 PM Result Value Ref Range    WBC 6.9 4.3 - 11.1 K/uL    RBC 4.79 4.05 - 5.2 M/uL    HGB 14.6 11.7 - 15.4 g/dL    HCT 44.3 35.8 - 46.3 %    MCV 92.5 79.6 - 97.8 FL    MCH 30.5 26.1 - 32.9 PG    MCHC 33.0 31.4 - 35.0 g/dL    RDW 13.7 11.9 - 14.6 %    PLATELET 354 923 - 071 K/uL    MPV 13.0 (H) 9.4 - 12.3 FL    ABSOLUTE NRBC 0.00 0.0 - 0.2 K/uL    DF AUTOMATED      NEUTROPHILS 73 43 - 78 %    LYMPHOCYTES 15 13 - 44 %    MONOCYTES 12 4.0 - 12.0 %    EOSINOPHILS 0 (L) 0.5 - 7.8 %    BASOPHILS 0 0.0 - 2.0 %    IMMATURE GRANULOCYTES 0 0.0 - 5.0 %    ABS. NEUTROPHILS 5.0 1.7 - 8.2 K/UL    ABS. LYMPHOCYTES 1.0 0.5 - 4.6 K/UL    ABS. MONOCYTES 0.8 0.1 - 1.3 K/UL    ABS. EOSINOPHILS 0.0 0.0 - 0.8 K/UL    ABS. BASOPHILS 0.0 0.0 - 0.2 K/UL    ABS. IMM. GRANS. 0.0 0.0 - 0.5 K/UL   POTASSIUM    Collection Time: 01/03/21 10:06 PM   Result Value Ref Range    Potassium >10.0 (HH) 3.5 - 5.1 mmol/L   POTASSIUM    Collection Time: 01/03/21 11:04 PM   Result Value Ref Range    Potassium 5.2 (H) 3.5 - 5.1 mmol/L   URINE MICROSCOPIC    Collection Time: 01/03/21 11:15 PM   Result Value Ref Range    WBC >100 0 /hpf    RBC 20-50 0 /hpf    Epithelial cells 0-3 0 /hpf    Bacteria 2+ (H) 0 /hpf    Casts 0 0 /lpf    Crystals, urine 0 0 /LPF    Mucus TRACE 0 /lpf    Other observations RESULTS VERIFIED MANUALLY     LACTIC ACID    Collection Time: 01/03/21 11:28 PM   Result Value Ref Range    Lactic acid 2.5 (H) 0.4 - 2.0 MMOL/L   POTASSIUM    Collection Time: 01/03/21 11:28 PM   Result Value Ref Range    Potassium 3.8 3.5 - 5.1 mmol/L     CXR Results  (Last 48 hours)               01/03/21 2019  XR CHEST PORT Final result    Impression:  IMPRESSION: Minimal atelectasis or infiltrate in the left lung base. Narrative:  PORTABLE CHEST 1 VIEW       HISTORY:  COVID-19; lack of by mouth intake. COMPARISON: 2/14/2017       FINDINGS: There is minimal atelectasis or infiltrate in the left lung base.    There is a treated lower thoracic or upper lumbar compression deformity. CT Results  (Last 48 hours)    None              Assessment and Plan: Active Hospital Problems    Diagnosis Date Noted    UTI (urinary tract infection) 01/04/2021    COVID-19 virus infection 01/04/2021    Pulmonary hypertension ([facility] 02/12/2017      Left ventricle: Systolic function was hyperdynamic. Ejection fraction was  estimated in the range of 70 % to 75 %. Septal flattening consistent with RV  pressure and volume overload There were no regional wall motion   abnormalities. PAP est 65mmhg by TR     -  Right ventricle: The ventricle was markedly dilated. Systolic function was  reduced. Principal Problem:    COVID-19 virus infection (1/4/2021)   Admit to COVID floor, follow-up COVID testing, isolation precautions   Supplemental O2 to maintain saturation greater than 92%   Treatment with  ceftriaxone, steroids   Albuterol as needed for shortness of breath,   Obtain baseline type and screen, d dimer, Vitamin D level   Tylenol PRN for fevers, Hycodan and robitussin for cough   convalescent plasma therapy, patient is unable to consent. Type and screen will be ordered and consent can be obtained during dayshift   If patient decompensates, will consider remdesivir; consult Pulm and/or ID as deemed appropriate  --Vitamin C, Zinc    Active Problems:    Pulmonary [facility] ([facility] (2/12/2017)    Chronic condition is stable, but may affect hospital stay; continue home medications with the following changes, if any:    Will continue to monitor and adjust treatment as needed. UTI (urinary tract infection) (1/4/2021)  IV Rocephin; culture pending      · PLAN General  · DVT prophylaxis:  Lovenox  · Code status: Full;  HCPOA:   · Risk: high  · Anticipated DC needs:  · Estimated LOS:  Greater than 2 midnights  · Plans discussed with patient and/or caregiver; questions answered.     Parts of this document were created using dragon voice recognition software.  The chart has been reviewed but errors may still be present    Med records reviewed if applicable; findings:     Critical care time if applicable:      Signed By: Isaac Prince MD     January 4, 2021

## 2021-01-05 LAB
ALBUMIN SERPL-MCNC: 2.6 G/DL (ref 3.2–4.6)
ALBUMIN/GLOB SERPL: 0.9 {RATIO} (ref 1.2–3.5)
ALP SERPL-CCNC: 69 U/L (ref 50–136)
ALT SERPL-CCNC: 9 U/L (ref 12–65)
ANION GAP SERPL CALC-SCNC: 5 MMOL/L (ref 7–16)
AST SERPL-CCNC: 8 U/L (ref 15–37)
BASOPHILS # BLD: 0 K/UL (ref 0–0.2)
BASOPHILS NFR BLD: 0 % (ref 0–2)
BILIRUB SERPL-MCNC: 0.2 MG/DL (ref 0.2–1.1)
BUN SERPL-MCNC: 19 MG/DL (ref 8–23)
CALCIUM SERPL-MCNC: 8.4 MG/DL (ref 8.3–10.4)
CHLORIDE SERPL-SCNC: 111 MMOL/L (ref 98–107)
CO2 SERPL-SCNC: 25 MMOL/L (ref 21–32)
CREAT SERPL-MCNC: 0.66 MG/DL (ref 0.6–1)
D DIMER PPP FEU-MCNC: 0.71 UG/ML(FEU)
DIFFERENTIAL METHOD BLD: ABNORMAL
EOSINOPHIL # BLD: 0 K/UL (ref 0–0.8)
EOSINOPHIL NFR BLD: 0 % (ref 0.5–7.8)
ERYTHROCYTE [DISTWIDTH] IN BLOOD BY AUTOMATED COUNT: 12.9 % (ref 11.9–14.6)
GLOBULIN SER CALC-MCNC: 3 G/DL (ref 2.3–3.5)
GLUCOSE SERPL-MCNC: 137 MG/DL (ref 65–100)
HCT VFR BLD AUTO: 39.3 % (ref 35.8–46.3)
HGB BLD-MCNC: 12.8 G/DL (ref 11.7–15.4)
IMM GRANULOCYTES # BLD AUTO: 0 K/UL (ref 0–0.5)
IMM GRANULOCYTES NFR BLD AUTO: 0 % (ref 0–5)
LYMPHOCYTES # BLD: 0.6 K/UL (ref 0.5–4.6)
LYMPHOCYTES NFR BLD: 12 % (ref 13–44)
MCH RBC QN AUTO: 30.1 PG (ref 26.1–32.9)
MCHC RBC AUTO-ENTMCNC: 32.6 G/DL (ref 31.4–35)
MCV RBC AUTO: 92.5 FL (ref 79.6–97.8)
MONOCYTES # BLD: 0.3 K/UL (ref 0.1–1.3)
MONOCYTES NFR BLD: 7 % (ref 4–12)
NEUTS SEG # BLD: 4 K/UL (ref 1.7–8.2)
NEUTS SEG NFR BLD: 81 % (ref 43–78)
NRBC # BLD: 0 K/UL (ref 0–0.2)
PLATELET # BLD AUTO: 226 K/UL (ref 150–450)
PMV BLD AUTO: 11.5 FL (ref 9.4–12.3)
POTASSIUM SERPL-SCNC: 3.8 MMOL/L (ref 3.5–5.1)
PROT SERPL-MCNC: 5.6 G/DL (ref 6.3–8.2)
RBC # BLD AUTO: 4.25 M/UL (ref 4.05–5.2)
SODIUM SERPL-SCNC: 141 MMOL/L (ref 136–145)
WBC # BLD AUTO: 5 K/UL (ref 4.3–11.1)

## 2021-01-05 PROCEDURE — 65270000029 HC RM PRIVATE

## 2021-01-05 PROCEDURE — 74011000250 HC RX REV CODE- 250: Performed by: FAMILY MEDICINE

## 2021-01-05 PROCEDURE — 74011250637 HC RX REV CODE- 250/637: Performed by: FAMILY MEDICINE

## 2021-01-05 PROCEDURE — 36430 TRANSFUSION BLD/BLD COMPNT: CPT

## 2021-01-05 PROCEDURE — 80053 COMPREHEN METABOLIC PANEL: CPT

## 2021-01-05 PROCEDURE — 85025 COMPLETE CBC W/AUTO DIFF WBC: CPT

## 2021-01-05 PROCEDURE — 74011250636 HC RX REV CODE- 250/636: Performed by: FAMILY MEDICINE

## 2021-01-05 PROCEDURE — XW033E5 INTRODUCTION OF REMDESIVIR ANTI-INFECTIVE INTO PERIPHERAL VEIN, PERCUTANEOUS APPROACH, NEW TECHNOLOGY GROUP 5: ICD-10-PCS | Performed by: FAMILY MEDICINE

## 2021-01-05 PROCEDURE — 36415 COLL VENOUS BLD VENIPUNCTURE: CPT

## 2021-01-05 PROCEDURE — 74011000258 HC RX REV CODE- 258: Performed by: FAMILY MEDICINE

## 2021-01-05 PROCEDURE — 85379 FIBRIN DEGRADATION QUANT: CPT

## 2021-01-05 PROCEDURE — XW13325 TRANSFUSION OF CONVALESCENT PLASMA (NONAUTOLOGOUS) INTO PERIPHERAL VEIN, PERCUTANEOUS APPROACH, NEW TECHNOLOGY GROUP 5: ICD-10-PCS | Performed by: FAMILY MEDICINE

## 2021-01-05 RX ORDER — DEXTROSE, SODIUM CHLORIDE, SODIUM LACTATE, POTASSIUM CHLORIDE, AND CALCIUM CHLORIDE 5; .6; .31; .03; .02 G/100ML; G/100ML; G/100ML; G/100ML; G/100ML
75 INJECTION, SOLUTION INTRAVENOUS CONTINUOUS
Status: DISCONTINUED | OUTPATIENT
Start: 2021-01-05 | End: 2021-01-08

## 2021-01-05 RX ADMIN — ENOXAPARIN SODIUM 30 MG: 30 INJECTION SUBCUTANEOUS at 17:02

## 2021-01-05 RX ADMIN — OXYCODONE HYDROCHLORIDE AND ACETAMINOPHEN 500 MG: 500 TABLET ORAL at 17:02

## 2021-01-05 RX ADMIN — Medication 220 MG: at 08:56

## 2021-01-05 RX ADMIN — OXYCODONE HYDROCHLORIDE AND ACETAMINOPHEN 500 MG: 500 TABLET ORAL at 08:56

## 2021-01-05 RX ADMIN — VITAMIN D, TAB 1000IU (100/BT) 1 TABLET: 25 TAB at 08:56

## 2021-01-05 RX ADMIN — Medication 10 ML: at 05:18

## 2021-01-05 RX ADMIN — Medication 10 ML: at 14:11

## 2021-01-05 RX ADMIN — Medication 10 ML: at 21:03

## 2021-01-05 RX ADMIN — SODIUM CHLORIDE, SODIUM LACTATE, POTASSIUM CHLORIDE, CALCIUM CHLORIDE, AND DEXTROSE MONOHYDRATE 75 ML/HR: 600; 310; 30; 20; 5 INJECTION, SOLUTION INTRAVENOUS at 15:28

## 2021-01-05 RX ADMIN — REMDESIVIR 100 MG: 100 INJECTION, POWDER, LYOPHILIZED, FOR SOLUTION INTRAVENOUS at 21:04

## 2021-01-05 RX ADMIN — ENOXAPARIN SODIUM 30 MG: 30 INJECTION SUBCUTANEOUS at 05:18

## 2021-01-05 RX ADMIN — CEFTRIAXONE SODIUM 1 G: 1 INJECTION, POWDER, FOR SOLUTION INTRAMUSCULAR; INTRAVENOUS at 21:02

## 2021-01-05 NOTE — PROGRESS NOTES
Hospitalist Progress Note    2021  Admit Date: 1/3/2021  7:56 PM   NAME: Alberto May   :  1934   MRN:  [id number]   Attending: Maryuri Domingo MD  PCP:  Antoinette Tanner MD    SUBJECTIVE:   Patient is an 79yo F wit hx dementia, chronic respiratory failure on 4L, chronic white who presented with COVID and decreased intake. Also noted to have pyuria. :  NAEON. On baseline home 4L NC. Gives a few one-word answers. Feels \"terrible\" and discomfort is located in [place]"  Denies dyspnea. Not able to communicate much more. PHYSICAL EXAM     Visit Vitals  /78   Pulse 65   Temp 97.6 °F (36.4 °C)   Resp 20   Ht 5' 3\" (1.6 m)   Wt 59 kg (130 lb)   SpO2 97%   Breastfeeding No   BMI 23.03 kg/m²      Temp (24hrs), Av.1 °F (36.7 °C), Min:97.6 °F (36.4 °C), Max:98.4 °F (36.9 °C)    Oxygen Therapy  O2 Sat (%): 97 % (21 1138)  Pulse via Oximetry: 65 beats per minute (21 0045)  O2 Device: Nasal cannula (21 0746)  O2 Flow Rate (L/min): 4 l/min (21 0746)    Intake/Output Summary (Last 24 hours) at 2021 1213  Last data filed at 2021 1245  Gross per 24 hour   Intake 0 ml   Output    Net 0 ml      General: Elderly, thin, lethargic  Lungs:  CTA Bilaterally. Heart:  Regular rate and rhythm,  No murmur, rub, or gallop  Abdomen: Soft, Non distended, Non tender, Positive bowel sounds  Extremities: No cyanosis, clubbing or edema  Neurologic:  Dementia, lethargy    XR CHEST PORT   Final Result   IMPRESSION: Minimal atelectasis or infiltrate in the left lung base. ASSESSMENT      Active Hospital Problems    Diagnosis Date Noted    UTI (urinary tract infection) 2021    COVID-19 virus infection 2021    Pulmonary hypertension ([address] 2017      Left ventricle: Systolic function was hyperdynamic. Ejection fraction was  estimated in the range of 70 % to 75 %. Septal flattening consistent with RV  pressure and volume overload There were no regional wall motion   abnormalities. PAP est 65mmhg by TR     -  Right ventricle: The ventricle was markedly dilated. Systolic function was  reduced. Plan:    Covid/chronic respiratory failure  At baseline oxygen requirement  Stop steroids and azithromycin with no evidence worsening resp sx or change in hypoxia  Continue remdesivir  Continue rocephin for pyuria, cx pending. May just be colonized.   Received some IV fluids in ER - use judiciously in covid but may restart if not awake enough to take PO    DVT Prophylaxis: lovenox  Dispo: pending    Signed By: Leonides Altamirano MD     January 5, 2021

## 2021-01-05 NOTE — PROGRESS NOTES
Physician Progress Note      Thuy Pandey  CSN #:                  [id number]  :                       1934  ADMIT DATE:       1/3/2021 7:56 PM  [address] [place] DATE:  RESPONDING  PROVIDER #:        Miguel Clement MD          QUERY TEXT:    Pt admitted with COVID-19. Pt. noted to have advanced dementia and has bedridden documented. If possible, please document in the progress notes and/or discharge summary if you are treating and/or evaluating any of the following: The medical record reflects the following:  Risk Factors: advanced dementia  Clinical Indicators: totally bedridden, Has to have 1:1 feeding with chopped foods  Treatment: frequent rounding, turning every 2 hours, complete feeding all meals, complete care for bathing  Options provided:  -- Functional quadriplegia  -- Severe debility/impaired mobility  -- Other - I will add my own diagnosis  -- Disagree - Not applicable / Not valid  -- Disagree - Clinically unable to determine / Unknown  -- Refer to Clinical Documentation Reviewer    PROVIDER RESPONSE TEXT:    This patient has functional quadriplegia.     Query created by: Chata Coats on 2021 4:13 PM      Electronically signed by:  Miguel Clement MD 2021 4:56 PM

## 2021-01-05 NOTE — PROGRESS NOTES
Received report from [place], [facility]. Patient awake resting in bed. Respirations present. On 4 L NC. No signs of distress. AxO x1. No needs expressed. Bed low and locked. Call light within reach. Will continue to monitor.

## 2021-01-06 LAB
ALBUMIN SERPL-MCNC: 2.6 G/DL (ref 3.2–4.6)
ALBUMIN/GLOB SERPL: 0.8 {RATIO} (ref 1.2–3.5)
ALP SERPL-CCNC: 61 U/L (ref 50–136)
ALT SERPL-CCNC: 8 U/L (ref 12–65)
ANION GAP SERPL CALC-SCNC: 4 MMOL/L (ref 7–16)
AST SERPL-CCNC: 8 U/L (ref 15–37)
BASOPHILS # BLD: 0 K/UL (ref 0–0.2)
BASOPHILS NFR BLD: 0 % (ref 0–2)
BILIRUB SERPL-MCNC: 0.2 MG/DL (ref 0.2–1.1)
BLD PROD TYP BPU: NORMAL
BPU ID: NORMAL
BUN SERPL-MCNC: 23 MG/DL (ref 8–23)
CALCIUM SERPL-MCNC: 8.1 MG/DL (ref 8.3–10.4)
CHLORIDE SERPL-SCNC: 110 MMOL/L (ref 98–107)
CO2 SERPL-SCNC: 30 MMOL/L (ref 21–32)
CREAT SERPL-MCNC: 0.61 MG/DL (ref 0.6–1)
DIFFERENTIAL METHOD BLD: ABNORMAL
EOSINOPHIL # BLD: 0.2 K/UL (ref 0–0.8)
EOSINOPHIL NFR BLD: 3 % (ref 0.5–7.8)
ERYTHROCYTE [DISTWIDTH] IN BLOOD BY AUTOMATED COUNT: 12.9 % (ref 11.9–14.6)
GLOBULIN SER CALC-MCNC: 3.3 G/DL (ref 2.3–3.5)
GLUCOSE SERPL-MCNC: 97 MG/DL (ref 65–100)
HCT VFR BLD AUTO: 36.5 % (ref 35.8–46.3)
HGB BLD-MCNC: 12 G/DL (ref 11.7–15.4)
IMM GRANULOCYTES # BLD AUTO: 0 K/UL (ref 0–0.5)
IMM GRANULOCYTES NFR BLD AUTO: 0 % (ref 0–5)
LYMPHOCYTES # BLD: 1.1 K/UL (ref 0.5–4.6)
LYMPHOCYTES NFR BLD: 21 % (ref 13–44)
MCH RBC QN AUTO: 30.1 PG (ref 26.1–32.9)
MCHC RBC AUTO-ENTMCNC: 32.9 G/DL (ref 31.4–35)
MCV RBC AUTO: 91.5 FL (ref 79.6–97.8)
MONOCYTES # BLD: 0.6 K/UL (ref 0.1–1.3)
MONOCYTES NFR BLD: 12 % (ref 4–12)
NEUTS SEG # BLD: 3.4 K/UL (ref 1.7–8.2)
NEUTS SEG NFR BLD: 64 % (ref 43–78)
NRBC # BLD: 0 K/UL (ref 0–0.2)
PLATELET # BLD AUTO: 240 K/UL (ref 150–450)
PMV BLD AUTO: 11.3 FL (ref 9.4–12.3)
POTASSIUM SERPL-SCNC: 3 MMOL/L (ref 3.5–5.1)
PROT SERPL-MCNC: 5.9 G/DL (ref 6.3–8.2)
RBC # BLD AUTO: 3.99 M/UL (ref 4.05–5.2)
SODIUM SERPL-SCNC: 144 MMOL/L (ref 136–145)
STATUS OF UNIT,%ST: NORMAL
UNIT DIVISION, %UDIV: NORMAL
WBC # BLD AUTO: 5.4 K/UL (ref 4.3–11.1)

## 2021-01-06 PROCEDURE — 74011250636 HC RX REV CODE- 250/636: Performed by: FAMILY MEDICINE

## 2021-01-06 PROCEDURE — 74011250637 HC RX REV CODE- 250/637: Performed by: FAMILY MEDICINE

## 2021-01-06 PROCEDURE — 65270000029 HC RM PRIVATE

## 2021-01-06 PROCEDURE — 74011000250 HC RX REV CODE- 250: Performed by: FAMILY MEDICINE

## 2021-01-06 PROCEDURE — 80053 COMPREHEN METABOLIC PANEL: CPT

## 2021-01-06 PROCEDURE — 85025 COMPLETE CBC W/AUTO DIFF WBC: CPT

## 2021-01-06 PROCEDURE — 36415 COLL VENOUS BLD VENIPUNCTURE: CPT

## 2021-01-06 RX ADMIN — OXYCODONE HYDROCHLORIDE AND ACETAMINOPHEN 500 MG: 500 TABLET ORAL at 17:41

## 2021-01-06 RX ADMIN — Medication 220 MG: at 09:33

## 2021-01-06 RX ADMIN — REMDESIVIR 100 MG: 100 INJECTION, POWDER, LYOPHILIZED, FOR SOLUTION INTRAVENOUS at 21:25

## 2021-01-06 RX ADMIN — ENOXAPARIN SODIUM 30 MG: 30 INJECTION SUBCUTANEOUS at 06:43

## 2021-01-06 RX ADMIN — OXYCODONE HYDROCHLORIDE AND ACETAMINOPHEN 500 MG: 500 TABLET ORAL at 09:33

## 2021-01-06 RX ADMIN — Medication 10 ML: at 21:24

## 2021-01-06 RX ADMIN — Medication 5 ML: at 06:43

## 2021-01-06 RX ADMIN — SODIUM CHLORIDE, SODIUM LACTATE, POTASSIUM CHLORIDE, CALCIUM CHLORIDE, AND DEXTROSE MONOHYDRATE 75 ML/HR: 600; 310; 30; 20; 5 INJECTION, SOLUTION INTRAVENOUS at 10:16

## 2021-01-06 RX ADMIN — Medication 10 ML: at 14:00

## 2021-01-06 RX ADMIN — VITAMIN D, TAB 1000IU (100/BT) 1 TABLET: 25 TAB at 09:34

## 2021-01-06 RX ADMIN — ENOXAPARIN SODIUM 30 MG: 30 INJECTION SUBCUTANEOUS at 17:41

## 2021-01-06 NOTE — PROGRESS NOTES
Patient asleep in bed. Respirations present. On 4 L NC. No signs of distress. No needs assessed. D5 LR @ 75 ml/hr. Bed low and locked. Call light within reach. Bedside report given to oncoming RNJd.

## 2021-01-06 NOTE — PROGRESS NOTES
Hospitalist Progress Note    2021  Admit Date: 1/3/2021  7:56 PM   NAME: Robert Borrero   :  1934   MRN:  [id number]   Attending: Teresa Broussard MD  PCP:  Inderjit Kirkland MD    SUBJECTIVE:   Patient is an 79yo F wit hx dementia, chronic respiratory failure on 4L, chronic white who presented with COVID and decreased intake. Also noted to have pyuria. :  NAEON. Gives a few one-word answers. Feels \"terrible\" but can't be more specific. Ate some of breakfast  Denies dyspnea. Not able to communicate much more. PHYSICAL EXAM     Visit Vitals  BP (!) 145/87 (BP 1 Location: Left arm, BP Patient Position: At rest)   Pulse (!) 59   Temp 97.2 °F (36.2 °C)   Resp 18   Ht 5' 3\" (1.6 m)   Wt 59 kg (130 lb)   SpO2 96%   Breastfeeding No   BMI 23.03 kg/m²      Temp (24hrs), Av.6 °F (36.4 °C), Min:97.2 °F (36.2 °C), Max:98.6 °F (37 °C)    Oxygen Therapy  O2 Sat (%): 96 % (21 0903)  Pulse via Oximetry: 65 beats per minute (21 0045)  O2 Device: Nasal cannula (21)  O2 Flow Rate (L/min): 4 l/min (21)    Intake/Output Summary (Last 24 hours) at 2021 1045  Last data filed at 2021 1808  Gross per 24 hour   Intake 387.1 ml   Output 300 ml   Net 87.1 ml      General: Elderly, thin, lethargic  Lungs:  CTA Bilaterally. Heart:  Regular rate and rhythm,  No murmur, rub, or gallop  Abdomen: Soft, Non distended, Non tender, Positive bowel sounds  Extremities: No cyanosis, clubbing or edema  Neurologic:  Dementia, lethargy    XR CHEST PORT   Final Result   IMPRESSION: Minimal atelectasis or infiltrate in the left lung base. ASSESSMENT      Active Hospital Problems    Diagnosis Date Noted    UTI (urinary tract infection) 2021    COVID-19 virus infection 2021    Pulmonary hypertension ([address] 2017      Left ventricle: Systolic function was hyperdynamic. Ejection fraction was  estimated in the range of 70 % to 75 %. Septal flattening consistent with RV  pressure and volume overload There were no regional wall motion   abnormalities. PAP est 65mmhg by TR     -  Right ventricle: The ventricle was markedly dilated. Systolic function was  reduced. Plan:    Covid/chronic respiratory failure  Clarified with son - was formerly on oxygen and they restarted it but at recent baseline has not been on any O2. Weaning back off today regardless. On remdesivir, consider stopping if able to stay off oxygen  Continue rocephin for pyuria, cx pending. May just be colonized.   Gentle hydration for poor intake  Dementia severe at baseline but able to converse on good days    DVT Prophylaxis: lovenox  Dispo: pending    Signed By: Romel Colin MD     January 6, 2021

## 2021-01-06 NOTE — PROGRESS NOTES
Assessment completed and patient is alert with confusion. 02 at 4 liters via nc and no breathing difficulty noted. Maravilla draining cloudy yellow urine.   Call light in reach and will continue to assess

## 2021-01-06 NOTE — PROGRESS NOTES
Patient resting in bed and no pain or distress noted. Difficult stick sent down to lab. Patient is alert and confused. Safety measures in place and call light in reach. Will prepare report for oncoming nurse.

## 2021-01-06 NOTE — PROGRESS NOTES
Received bedside shift report from Moira Rice RN. Pt lying in bed. No apparent distress. Respirations even and unlabored. Instructed to call for assistance with needs, as they arise. Pt voiced understanding.

## 2021-01-07 LAB
ALBUMIN SERPL-MCNC: 2.7 G/DL (ref 3.2–4.6)
ALBUMIN/GLOB SERPL: 1 {RATIO} (ref 1.2–3.5)
ALP SERPL-CCNC: 64 U/L (ref 50–136)
ALT SERPL-CCNC: 11 U/L (ref 12–65)
ANION GAP SERPL CALC-SCNC: 5 MMOL/L (ref 7–16)
AST SERPL-CCNC: 12 U/L (ref 15–37)
BACTERIA SPEC CULT: NORMAL
BILIRUB SERPL-MCNC: 0.4 MG/DL (ref 0.2–1.1)
BUN SERPL-MCNC: 10 MG/DL (ref 8–23)
CALCIUM SERPL-MCNC: 7.8 MG/DL (ref 8.3–10.4)
CHLORIDE SERPL-SCNC: 104 MMOL/L (ref 98–107)
CO2 SERPL-SCNC: 29 MMOL/L (ref 21–32)
CREAT SERPL-MCNC: 0.44 MG/DL (ref 0.6–1)
GLOBULIN SER CALC-MCNC: 2.6 G/DL (ref 2.3–3.5)
GLUCOSE SERPL-MCNC: 143 MG/DL (ref 65–100)
POTASSIUM SERPL-SCNC: 2.8 MMOL/L (ref 3.5–5.1)
PROT SERPL-MCNC: 5.3 G/DL (ref 6.3–8.2)
SERVICE CMNT-IMP: NORMAL
SODIUM SERPL-SCNC: 138 MMOL/L (ref 136–145)

## 2021-01-07 PROCEDURE — 74011250637 HC RX REV CODE- 250/637: Performed by: FAMILY MEDICINE

## 2021-01-07 PROCEDURE — 74011250636 HC RX REV CODE- 250/636: Performed by: FAMILY MEDICINE

## 2021-01-07 PROCEDURE — 94760 N-INVAS EAR/PLS OXIMETRY 1: CPT

## 2021-01-07 PROCEDURE — 92610 EVALUATE SWALLOWING FUNCTION: CPT

## 2021-01-07 PROCEDURE — 36415 COLL VENOUS BLD VENIPUNCTURE: CPT

## 2021-01-07 PROCEDURE — 65270000029 HC RM PRIVATE

## 2021-01-07 PROCEDURE — 80053 COMPREHEN METABOLIC PANEL: CPT

## 2021-01-07 PROCEDURE — 74011000250 HC RX REV CODE- 250: Performed by: FAMILY MEDICINE

## 2021-01-07 PROCEDURE — 2709999900 HC NON-CHARGEABLE SUPPLY

## 2021-01-07 PROCEDURE — 77010033678 HC OXYGEN DAILY

## 2021-01-07 RX ORDER — POTASSIUM CHLORIDE 14.9 MG/ML
20 INJECTION INTRAVENOUS
Status: COMPLETED | OUTPATIENT
Start: 2021-01-07 | End: 2021-01-07

## 2021-01-07 RX ADMIN — REMDESIVIR 100 MG: 100 INJECTION, POWDER, LYOPHILIZED, FOR SOLUTION INTRAVENOUS at 21:37

## 2021-01-07 RX ADMIN — POTASSIUM CHLORIDE 20 MEQ: 200 INJECTION, SOLUTION INTRAVENOUS at 16:22

## 2021-01-07 RX ADMIN — SODIUM CHLORIDE, SODIUM LACTATE, POTASSIUM CHLORIDE, CALCIUM CHLORIDE, AND DEXTROSE MONOHYDRATE 75 ML/HR: 600; 310; 30; 20; 5 INJECTION, SOLUTION INTRAVENOUS at 01:50

## 2021-01-07 RX ADMIN — Medication 5 ML: at 21:37

## 2021-01-07 RX ADMIN — Medication 220 MG: at 09:16

## 2021-01-07 RX ADMIN — OXYCODONE HYDROCHLORIDE AND ACETAMINOPHEN 500 MG: 500 TABLET ORAL at 09:16

## 2021-01-07 RX ADMIN — Medication 10 ML: at 14:00

## 2021-01-07 RX ADMIN — ENOXAPARIN SODIUM 30 MG: 30 INJECTION SUBCUTANEOUS at 17:51

## 2021-01-07 RX ADMIN — VITAMIN D, TAB 1000IU (100/BT) 1 TABLET: 25 TAB at 09:16

## 2021-01-07 RX ADMIN — Medication 10 ML: at 05:32

## 2021-01-07 RX ADMIN — POTASSIUM CHLORIDE 20 MEQ: 200 INJECTION, SOLUTION INTRAVENOUS at 18:54

## 2021-01-07 RX ADMIN — ENOXAPARIN SODIUM 30 MG: 30 INJECTION SUBCUTANEOUS at 05:32

## 2021-01-07 NOTE — PROGRESS NOTES
Unable to tolerate po meds this morning. Vomited up her medications shortly after administering. Also vomited up a large yellow thick amount of mucous.

## 2021-01-07 NOTE — PROGRESS NOTES
Received bedside shift report from Shelia Pak RN. Pt lying in bed. No apparent distress. Respirations even and unlabored on 4L NC. Instructed to call for assistance with needs, as they arise. Pt voiced understanding.

## 2021-01-07 NOTE — PROGRESS NOTES
Hospitalist Progress Note    2021  Admit Date: 1/3/2021  7:56 PM   NAME: Brittany Serrato   :  1934   MRN:  [id number]   Attending: Altaf Ulloa MD  PCP:  Jass Griffin MD    SUBJECTIVE:   Patient is an 81yo F wit hx dementia, chronic respiratory failure on 4L, chronic white who presented with COVID and decreased intake. Also noted to have pyuria. :  NAEON. Weaning oxygen off. Still feels \"terrible\" no appetite, no nausea or pain. Denies dyspnea  Mixed skin emma on UCx    PHYSICAL EXAM     Visit Vitals  /74   Pulse 76   Temp 98.6 °F (37 °C)   Resp 20   Ht 5' 3\" (1.6 m)   Wt 59 kg (130 lb)   SpO2 96%   Breastfeeding No   BMI 23.03 kg/m²      Temp (24hrs), Av.4 °F (36.9 °C), Min:97.9 °F (36.6 °C), Max:98.7 °F (37.1 °C)    Oxygen Therapy  O2 Sat (%): 96 % (21 1212)  Pulse via Oximetry: 69 beats per minute (21 1212)  O2 Device: Nasal cannula (21 1221)  O2 Flow Rate (L/min): 1 l/min (21 1221)    Intake/Output Summary (Last 24 hours) at 2021 1449  Last data filed at 2021 1405  Gross per 24 hour   Intake 960 ml   Output 800 ml   Net 160 ml      General: Elderly, thin, lethargic  Lungs:  CTA Bilaterally. Heart:  Regular rate and rhythm,  No murmur, rub, or gallop  Abdomen: Soft, Non distended, Non tender, Positive bowel sounds  Extremities: No cyanosis, clubbing or edema  Neurologic:  Dementia, lethargy    XR CHEST PORT   Final Result   IMPRESSION: Minimal atelectasis or infiltrate in the left lung base. ASSESSMENT      Active Hospital Problems    Diagnosis Date Noted    UTI (urinary tract infection) 2021    COVID-19 virus infection 2021    Pulmonary hypertension ([address] 2017      Left ventricle: Systolic function was hyperdynamic. Ejection fraction was  estimated in the range of 70 % to 75 %. Septal flattening consistent with RV  pressure and volume overload There were no regional wall motion   abnormalities.      PAP est 65mmhg by TR     -  Right ventricle: The ventricle was markedly dilated. Systolic function was  reduced. Plan:    Covid/chronic respiratory failure  Clarified with son - was formerly on oxygen and they restarted it but at recent baseline has not been on any O2. Weaned back off, does not look like she has been actually hypoxic. Too weak/lethargic for PO intake    Will finish remdesivir  Stop rocephin for pyuria, cx with skin emma. May just be colonized.   Gentle hydration for poor intake  Dementia severe at baseline but able to converse on good days    Hypokalemia  Replace IV    DVT Prophylaxis: lovenox  Dispo: pending    Signed By: Matt Floyd MD     January 7, 2021

## 2021-01-07 NOTE — PROGRESS NOTES
Patient resting in bed, alert with periods of confusion, but cooperative with care. Patient on 4 liters of Oxygen via NC. patient denies pain or distress, safety measures in place, call light within reach.

## 2021-01-07 NOTE — PROGRESS NOTES
Order received, chart reviewed,  Noted that Ms. Macy Denson is totally bedridden, gets 24 hour care. No indication for skilled PT at this time.   Rosalba Cherry, PT

## 2021-01-07 NOTE — PROGRESS NOTES
LTG: Patient will tolerate least restrictive diet without overt signs or symptoms of airway compromise. STG: Patient will tolerate po trials without s/sx airway compromise in efforts to identify safest oral diet   STG: Patient will participate in modified barium swallow study as clinically indicated. SPEECH LANGUAGE PATHOLOGY: DYSPHAGIA- Initial Assessment    NAME/AGE/GENDER: Navarro Josue is a 80 y.o. female  DATE: 1/7/2021  PRIMARY DIAGNOSIS: COVID-19 virus infection [U07.1]  UTI (urinary tract infection) [N39.0]      ICD-10: Treatment Diagnosis: R13.12 Dysphagia, Oropharyngeal Phase    RECOMMENDATIONS   DIET:    NPO (unable to recommend a diet)     MEDICATIONS: Non-oral  or per MD (due to vomiting)     ASPIRATION PRECAUTIONS  · Slow rate of intake  · Small bites/sips  · Upright at 90 degrees during meal     COMPENSATORY STRATEGIES/MODIFICATIONS  · None     RECOMMENDATIONS for CONTINUED SPEECH THERAPY:   YES: Anticipate need for ongoing speech therapy during this hospitalization and at next level of care. ASSESSMENT   Patient presents with severely impaired oral acceptance due to concern of vomiting, despite MAX encouragement/attempts by SLP and RN. No overt s/sx with tsp amounts of thin liquid x2 and single bite of puree, but refused all other trials. Therefore, extremely limited ability to recommend appropriate diet and suspect administering po meds will be incredibly difficult due to patient's refusal.     Recommend NPO due to extremely limited assessment, patient's concern of vomiting, and the amount of cueing needed to accept minimal trials. Hopefully can resume an oral diet once patient accepting trials. CONTINUATION OF SKILLED SERVICES/MEDICAL NECESSITY:   Patient is expected to demonstrate progress in  swallow strength, swallow timeliness, swallow function and swallow safety in order to  improve swallow safety, work toward diet advancement and decrease aspiration risk.    Patient continues to require skilled intervention due to possible dysphagia. EDUCATION:  · Recommendations discussed with Patient and RN  REHABILITATION POTENTIAL FOR STATED GOALS: Excellent    PLAN    FREQUENCY/DURATION: Continue to follow patient 3 times a week for duration of hospital stay to address above goals. - Recommendations for next treatment session: Next treatment will address po trials    SUBJECTIVE   Positioned upright in bed with RN assistance. \"no I'll vomit\" \"it'll come back up\". Reports feels \"bad\"     Oxygen Device: nasal canula 2L  Pain: Pain Scale 1: Numeric (0 - 10)  Pain Intensity 1: 0    History of Present Injury/Illness: Ms. Irene Tobar  has a past medical history of Arrhythmia, CAD (coronary artery disease), Chronic pain, Esophagitis determined by endoscopy (2/7/2017), Gastrointestinal disorder, Gastrointestinal disorder, and GERD (gastroesophageal reflux disease). . She also  has a past surgical history that includes hx hysterectomy; pr breast surgery procedure unlisted; and flexible sigmoidoscopy (N/A, 2/7/2017). PRECAUTIONS/ALLERGIES: Aspirin and Pcn [penicillins]     Problem List:  (Impairments causing functional limitations):  1. Possible oropharyngeal dysphagia     Previous Dysphagia: YES RN reports vomitting meds    Per chart, \"Has to have 1:1 feeding with chopped foods. Son states he has noted that she has been choking on food lately so will make purred diet\" and has demonstrated poor po intake. RN reports vomited after meds this AM.    Diet Prior to Evaluation: regular/thin liquids    Orientation:  Person  hospital    Cognitive-Linguistic Screening:alert, follows basic 1 step commands. Answers basic questions or at times no response. Baseline dementia.      OBJECTIVE   Oral Motor:   · Labial: No impairment  · Dentition: Limited and Poor  · Oral Hygiene: Dry  · Lingual: No impairment    Swallow evaluation:   Patient consumed trials of thin liquid by tsp x1, thin via straw used as pipette x1, and tsp of puree x1 with MAX encouragement. No overt s/sx airway compromise, but again extremely difficult to assess with such minimal acceptance. Would not accept via cup or drink by straw. Turning head away and complaints of fear of vomiting. Tool Used: Dysphagia Outcome and Severity Scale (WADE)    Score Comments   Normal Diet  [] 7 With no strategies or extra time needed   Functional Swallow  [] 6 May have mild oral or pharyngeal delay   Mild Dysphagia  [] 5 Which may require one diet consistency restricted    Mild-Moderate Dysphagia  [] 4 With 1-2 diet consistencies restricted   Moderate Dysphagia  [] 3 With 2 or more diet consistencies restricted   Moderate-Severe Dysphagia  [] 2 With partial PO strategies (trials with ST only)   Severe Dysphagia  [] 1 With inability to tolerate any PO safely      Score:  Initial: 2 Most Recent: x (Date 01/07/21 )   Interpretation of Tool: The Dysphagia Outcome and Severity Scale (WADE) is a simple, easy-to-use, 7-point scale developed to systematically rate the functional severity of dysphagia based on objective assessment and make recommendations for diet level, independence level, and type of nutrition. Current Medications:   No current facility-administered medications on file prior to encounter. Current Outpatient Medications on File Prior to Encounter   Medication Sig Dispense Refill    furosemide (LASIX) 20 mg tablet Take 20 mg by mouth daily.  busPIRone (BUSPAR) 5 mg tablet Take 1 Tab by mouth daily. 30 Tab 2    sucralfate (CARAFATE) 100 mg/mL suspension Take 10 mL by mouth Before breakfast, lunch, dinner and at bedtime. 414 mL 1    hydrocortisone (ANUSOL-HC) 2.5 % rectal cream Insert 1 g into rectum two (2) times a day. 30 g 0    HYDROcodone-homatropine (HYCODAN) 5-1.5 mg/5 mL (5 mL) syrup Take 5 mL by mouth every four (4) hours as needed for Cough.  Max Daily Amount: 30 mL. 240 mL 1    potassium chloride SR (KLOR-CON 10) 10 mEq tablet Take 10 mEq by mouth daily.  sucralfate (CARAFATE) 100 mg/mL suspension Take 1 tsp by mouth four (4) times daily as needed.  metoclopramide HCl (REGLAN) 5 mg tablet Take 1 Tab by mouth every six (6) hours as needed for Nausea.  20 Tab 0        INTERDISCIPLINARY COLLABORATION: RN    After treatment position/precautions:  · Upright in bed  · RN notified    Total Treatment Duration:   Time In: 1059  Time Out: [address], [facility], [address] [facility]

## 2021-01-08 LAB
ALBUMIN SERPL-MCNC: 2.5 G/DL (ref 3.2–4.6)
ALBUMIN/GLOB SERPL: 0.8 {RATIO} (ref 1.2–3.5)
ALP SERPL-CCNC: 66 U/L (ref 50–136)
ALT SERPL-CCNC: 13 U/L (ref 12–65)
ANION GAP SERPL CALC-SCNC: 7 MMOL/L (ref 7–16)
AST SERPL-CCNC: 16 U/L (ref 15–37)
BACTERIA SPEC CULT: NORMAL
BACTERIA SPEC CULT: NORMAL
BILIRUB SERPL-MCNC: 0.4 MG/DL (ref 0.2–1.1)
BUN SERPL-MCNC: 7 MG/DL (ref 8–23)
CALCIUM SERPL-MCNC: 7.7 MG/DL (ref 8.3–10.4)
CHLORIDE SERPL-SCNC: 101 MMOL/L (ref 98–107)
CO2 SERPL-SCNC: 29 MMOL/L (ref 21–32)
CREAT SERPL-MCNC: 0.5 MG/DL (ref 0.6–1)
ERYTHROCYTE [DISTWIDTH] IN BLOOD BY AUTOMATED COUNT: 12.4 % (ref 11.9–14.6)
GLOBULIN SER CALC-MCNC: 3.2 G/DL (ref 2.3–3.5)
GLUCOSE SERPL-MCNC: 143 MG/DL (ref 65–100)
HCT VFR BLD AUTO: 38.3 % (ref 35.8–46.3)
HGB BLD-MCNC: 13 G/DL (ref 11.7–15.4)
MCH RBC QN AUTO: 30 PG (ref 26.1–32.9)
MCHC RBC AUTO-ENTMCNC: 33.9 G/DL (ref 31.4–35)
MCV RBC AUTO: 88.2 FL (ref 79.6–97.8)
NRBC # BLD: 0 K/UL (ref 0–0.2)
PLATELET # BLD AUTO: 290 K/UL (ref 150–450)
PMV BLD AUTO: 11.3 FL (ref 9.4–12.3)
POTASSIUM SERPL-SCNC: 2.8 MMOL/L (ref 3.5–5.1)
PROT SERPL-MCNC: 5.7 G/DL (ref 6.3–8.2)
RBC # BLD AUTO: 4.34 M/UL (ref 4.05–5.2)
SERVICE CMNT-IMP: NORMAL
SERVICE CMNT-IMP: NORMAL
SODIUM SERPL-SCNC: 137 MMOL/L (ref 136–145)
WBC # BLD AUTO: 5.1 K/UL (ref 4.3–11.1)

## 2021-01-08 PROCEDURE — 36415 COLL VENOUS BLD VENIPUNCTURE: CPT

## 2021-01-08 PROCEDURE — 74011250636 HC RX REV CODE- 250/636: Performed by: FAMILY MEDICINE

## 2021-01-08 PROCEDURE — 80053 COMPREHEN METABOLIC PANEL: CPT

## 2021-01-08 PROCEDURE — 85027 COMPLETE CBC AUTOMATED: CPT

## 2021-01-08 PROCEDURE — 65270000029 HC RM PRIVATE

## 2021-01-08 RX ORDER — DEXTROSE, SODIUM CHLORIDE, AND POTASSIUM CHLORIDE 5; .45; .3 G/100ML; G/100ML; G/100ML
INJECTION INTRAVENOUS CONTINUOUS
Status: DISCONTINUED | OUTPATIENT
Start: 2021-01-08 | End: 2021-01-13

## 2021-01-08 RX ADMIN — SODIUM CHLORIDE, SODIUM LACTATE, POTASSIUM CHLORIDE, CALCIUM CHLORIDE, AND DEXTROSE MONOHYDRATE 75 ML/HR: 600; 310; 30; 20; 5 INJECTION, SOLUTION INTRAVENOUS at 06:29

## 2021-01-08 RX ADMIN — ENOXAPARIN SODIUM 30 MG: 30 INJECTION SUBCUTANEOUS at 05:24

## 2021-01-08 RX ADMIN — Medication 5 ML: at 22:01

## 2021-01-08 RX ADMIN — Medication 5 ML: at 05:24

## 2021-01-08 RX ADMIN — Medication 10 ML: at 13:11

## 2021-01-08 RX ADMIN — DEXTROSE MONOHYDRATE, SODIUM CHLORIDE, AND POTASSIUM CHLORIDE: 50; 4.5; 2.98 INJECTION, SOLUTION INTRAVENOUS at 17:46

## 2021-01-08 RX ADMIN — ENOXAPARIN SODIUM 30 MG: 30 INJECTION SUBCUTANEOUS at 17:36

## 2021-01-08 NOTE — PROGRESS NOTES
SPEECH PATHOLOGY NOTE:    Attempted to see patient for po trials, however she refused despite encouragement \"I'm sick\". Will check back at later time/date as schedule permits.        Dear[address] CCC-SLP

## 2021-01-08 NOTE — PROGRESS NOTES
CM spoke with patient's daughter in [place], [phone]. She and patient's son will decide if patient should return home with [facility] or [facility]. Patient has been with [facility] for several years.

## 2021-01-08 NOTE — PROGRESS NOTES
Pt resting in bed awake. Alert but unable to answer orientation questions. 2L NC in place. No s/sx of distress noted. Maravilla in place draining clear yellow urine. No evidence of pain at this time. Call light within reach. Will continue to monitor.

## 2021-01-08 NOTE — PROGRESS NOTES
Hospitalist Progress Note    2021  Admit Date: 1/3/2021  7:56 PM   NAME: Olivia Yoder   :  1934   MRN:  [id number]   Attending: Cristal Nunez MD  PCP:  Octavia Thomason MD    SUBJECTIVE:   Patient is an 81yo F wit hx dementia, chronic white who presented with COVID and decreased intake.   NAEON. Feeling \"all right\" this morning, which is an improvement from prior baseline of \"terrible\" but she is still lethargic. Anorexia. No fevers. No vomit. Son reports Gunjan Frances was very helpful in the past for helping her to eat more. Denies dyspnea  Mixed skin emma on UCx    PHYSICAL EXAM     Visit Vitals  /75 (BP 1 Location: Left arm, BP Patient Position: At rest)   Pulse 75   Temp 98.8 °F (37.1 °C)   Resp 20   Ht 5' 3\" (1.6 m)   Wt 59 kg (130 lb)   SpO2 94%   Breastfeeding No   BMI 23.03 kg/m²      Temp (24hrs), Av.3 °F (36.8 °C), Min:97.4 °F (36.3 °C), Max:99.2 °F (37.3 °C)    Oxygen Therapy  O2 Sat (%): 94 % (21 1129)  Pulse via Oximetry: 69 beats per minute (21 1212)  O2 Device: Nasal cannula (21)  O2 Flow Rate (L/min): 1 l/min (21)    Intake/Output Summary (Last 24 hours) at 2021 1140  Last data filed at 2021 0436  Gross per 24 hour   Intake 0 ml   Output 1400 ml   Net -1400 ml      General: Elderly, thin, lethargic  Lungs:  CTA Bilaterally. Heart:  Regular rate and rhythm,  No murmur, rub, or gallop  Abdomen: Soft, Non distended, Non tender, Positive bowel sounds  Extremities: No cyanosis, clubbing or edema  Neurologic:  Dementia, lethargy    XR CHEST PORT   Final Result   IMPRESSION: Minimal atelectasis or infiltrate in the left lung base. ASSESSMENT      Active Hospital Problems    Diagnosis Date Noted    UTI (urinary tract infection) 2021    COVID-19 virus infection 2021    Pulmonary hypertension ([address] 2017      Left ventricle: Systolic function was hyperdynamic.  Ejection fraction was  estimated in the range of 70 % to 75 %. Septal flattening consistent with RV  pressure and volume overload There were no regional wall motion   abnormalities. PAP est 65mmhg by TR     -  Right ventricle: The ventricle was markedly dilated. Systolic function was  reduced. Plan:    Covid/chronic respiratory failure  Clarified with son - was formerly on oxygen and they restarted it but at recent baseline has not been on any O2. Weaned back off, does not look like she has been actually hypoxic. Too weak/lethargic for PO intake - SLP following, has prn zofran which may help intake as well    s/p remdesivir  Stop rocephin for pyuria, cx with skin emma. May just be colonized.   Gentle hydration for poor intake  Dementia severe at baseline but able to converse on good days    Hypokalemia  Replace IV    DVT Prophylaxis: lovenox  Dispo: pending    Signed By: Cristina Marinelli MD     January 8, 2021

## 2021-01-09 LAB
ANION GAP SERPL CALC-SCNC: 8 MMOL/L (ref 7–16)
BUN SERPL-MCNC: 5 MG/DL (ref 8–23)
CALCIUM SERPL-MCNC: 7.9 MG/DL (ref 8.3–10.4)
CHLORIDE SERPL-SCNC: 102 MMOL/L (ref 98–107)
CO2 SERPL-SCNC: 27 MMOL/L (ref 21–32)
CREAT SERPL-MCNC: 0.5 MG/DL (ref 0.6–1)
GLUCOSE SERPL-MCNC: 152 MG/DL (ref 65–100)
MAGNESIUM SERPL-MCNC: 1.4 MG/DL (ref 1.8–2.4)
POTASSIUM SERPL-SCNC: 3 MMOL/L (ref 3.5–5.1)
SODIUM SERPL-SCNC: 137 MMOL/L (ref 136–145)

## 2021-01-09 PROCEDURE — 92526 ORAL FUNCTION THERAPY: CPT

## 2021-01-09 PROCEDURE — 36415 COLL VENOUS BLD VENIPUNCTURE: CPT

## 2021-01-09 PROCEDURE — 80048 BASIC METABOLIC PNL TOTAL CA: CPT

## 2021-01-09 PROCEDURE — 65270000029 HC RM PRIVATE

## 2021-01-09 PROCEDURE — 74011250636 HC RX REV CODE- 250/636: Performed by: FAMILY MEDICINE

## 2021-01-09 PROCEDURE — 83735 ASSAY OF MAGNESIUM: CPT

## 2021-01-09 RX ORDER — MAGNESIUM SULFATE HEPTAHYDRATE 40 MG/ML
2 INJECTION, SOLUTION INTRAVENOUS ONCE
Status: COMPLETED | OUTPATIENT
Start: 2021-01-09 | End: 2021-01-09

## 2021-01-09 RX ADMIN — Medication 10 ML: at 16:25

## 2021-01-09 RX ADMIN — ENOXAPARIN SODIUM 30 MG: 30 INJECTION SUBCUTANEOUS at 05:20

## 2021-01-09 RX ADMIN — Medication 10 ML: at 21:31

## 2021-01-09 RX ADMIN — MAGNESIUM SULFATE HEPTAHYDRATE 2 G: 40 INJECTION, SOLUTION INTRAVENOUS at 16:26

## 2021-01-09 RX ADMIN — ENOXAPARIN SODIUM 30 MG: 30 INJECTION SUBCUTANEOUS at 16:25

## 2021-01-09 RX ADMIN — Medication 5 ML: at 05:22

## 2021-01-09 NOTE — PROGRESS NOTES
Patient resting in bed with no complaints at this time. Patient is alert with confusion but no distress noted. IV intact and patent with no s/s of infection noted. Respirations even and unlabored with heart rate regular. Patient unable to ambulate independently without assistance; bed rest at this time. Patient NPO. Bed in low locked position with call light within reach. Patient instructed to call if assistance is needed. Will continue to monitor.

## 2021-01-09 NOTE — PROGRESS NOTES
Patient stable at this time. Patient appears comfortable at this time with no s/s of pain. Patient was more verbal this evening. Call light within reach and patient instructed to call if assistance is needed.   Report to be given to oncoming RN 7p-7a

## 2021-01-09 NOTE — PROGRESS NOTES
LTG: Patient will tolerate least restrictive diet without overt signs or symptoms of airway compromise. STG: Patient will tolerate po trials without s/sx airway compromise in efforts to identify safest oral diet   STG: Patient will participate in modified barium swallow study as clinically indicated. SPEECH LANGUAGE PATHOLOGY: DYSPHAGIA- Daily Note 1    NAME/AGE/GENDER: Jeremy Huang is a 80 y.o. female  DATE: 1/9/2021  PRIMARY DIAGNOSIS: COVID-19 virus infection [U07.1]  UTI (urinary tract infection) [N39.0]      ICD-10: Treatment Diagnosis: R13.12 Dysphagia, Oropharyngeal Phase    RECOMMENDATIONS   DIET:    NPO (unable to recommend a diet)     MEDICATIONS: Non-oral  or per MD (due to vomiting)     ASPIRATION PRECAUTIONS  · Slow rate of intake  · Small bites/sips  · Upright at 90 degrees during meal     COMPENSATORY STRATEGIES/MODIFICATIONS  · None     RECOMMENDATIONS for CONTINUED SPEECH THERAPY:   YES: Anticipate need for ongoing speech therapy during this hospitalization and at next level of care. ASSESSMENT   Patient agreeable to trials of applesauce and thin liquids. Tolerated 2 tsp applesauce with prompt, single swallow followed by 2 cup sips of thin liquid and 1 straw sip of thin liquids. Presented 2 additional tsp applesauce. On last presentation of applesauce, strong gag followed by weak coughing, throat clearing and heaving. Minimal emesis, however SLP provided some oral suction to assist. Increased WOB with episode. No further trials per clinical judgement however patient also declined further trials. May benefit from GI assessment as the oropharyngeal function appears within functional limits for initial presentations followed by signs of GI intolerance which complicate further trials/PO intake ad place patient at risk for aspiration. Recommend NPO due to concern for risk for aspiration with inability to maintain airway in vomiting episode.     CONTINUATION OF SKILLED SERVICES/MEDICAL NECESSITY:   Patient is expected to demonstrate progress in  swallow strength, swallow timeliness, swallow function and swallow safety in order to  improve swallow safety, work toward diet advancement and decrease aspiration risk.  Patient continues to require skilled intervention due to possible dysphagia. EDUCATION:  · Recommendations discussed with Patient and RN  REHABILITATION POTENTIAL FOR STATED GOALS: Excellent    PLAN    FREQUENCY/DURATION: Continue to follow patient 3 times a week for duration of hospital stay to address above goals. - Recommendations for next treatment session: Next treatment will address po trials    SUBJECTIVE   Says she feels bad but willing to have applesauce/water trials. Oxygen Device: nasal canula 2L  Pain: Pain Scale 1: FACES  Pain Intensity 1: 0    History of Present Injury/Illness: Ms. Osmar Brandon  has a past medical history of Arrhythmia, CAD (coronary artery disease), Chronic pain, Esophagitis determined by endoscopy (2/7/2017), Gastrointestinal disorder, Gastrointestinal disorder, and GERD (gastroesophageal reflux disease). . She also  has a past surgical history that includes hx hysterectomy; pr breast surgery procedure unlisted; and flexible sigmoidoscopy (N/A, 2/7/2017). PRECAUTIONS/ALLERGIES: Aspirin and Pcn [penicillins]     Problem List:  (Impairments causing functional limitations):  1. Possible oropharyngeal dysphagia     Previous Dysphagia: YES RN reports vomitting meds    Per chart, \"Has to have 1:1 feeding with chopped foods. Son states he has noted that she has been choking on food lately so will make purred diet\" and has demonstrated poor po intake. RN reports vomited after meds this AM.    Diet Prior to Evaluation: regular/thin liquids    Orientation:  Person  hospital    Cognitive-Linguistic Screening:alert, follows basic 1 step commands. Answers basic questions or at times no response. Baseline dementia.      OBJECTIVE   Oral Motor:   · Labial: No impairment  · Dentition: Limited and Poor  · Oral Hygiene: Dry  · Lingual: No impairment    Swallow evaluation: Patient agreeable to trials of applesauce and thin liquids. Tolerated 2 tsp applesauce with prompt, single swallow followed by 2 cup sips of thin liquid and 1 straw sip of thin liquids. Presented 2 additional tsp applesauce. On last presentation of applesauce, strong gag followed by coughing, throat clearing and heaving. Minimal emesis, however SLP provided some oral suction to assist. Increased WOB with episode. No further trials per clinical judgement however patient also declined further trials. Tool Used: Dysphagia Outcome and Severity Scale (WADE)    Score Comments   Normal Diet  [] 7 With no strategies or extra time needed   Functional Swallow  [] 6 May have mild oral or pharyngeal delay   Mild Dysphagia  [] 5 Which may require one diet consistency restricted    Mild-Moderate Dysphagia  [] 4 With 1-2 diet consistencies restricted   Moderate Dysphagia  [] 3 With 2 or more diet consistencies restricted   Moderate-Severe Dysphagia  [] 2 With partial PO strategies (trials with ST only)   Severe Dysphagia  [] 1 With inability to tolerate any PO safely      Score:  Initial: 2 Most Recent: 2 (Date 01/09/21 )   Interpretation of Tool: The Dysphagia Outcome and Severity Scale (WADE) is a simple, easy-to-use, 7-point scale developed to systematically rate the functional severity of dysphagia based on objective assessment and make recommendations for diet level, independence level, and type of nutrition. Current Medications:   No current facility-administered medications on file prior to encounter. Current Outpatient Medications on File Prior to Encounter   Medication Sig Dispense Refill    furosemide (LASIX) 20 mg tablet Take 20 mg by mouth daily.  busPIRone (BUSPAR) 5 mg tablet Take 1 Tab by mouth daily.  30 Tab 2    sucralfate (CARAFATE) 100 mg/mL suspension Take 10 mL by mouth Before breakfast, lunch, dinner and at bedtime. 414 mL 1    hydrocortisone (ANUSOL-HC) 2.5 % rectal cream Insert 1 g into rectum two (2) times a day. 30 g 0    HYDROcodone-homatropine (HYCODAN) 5-1.5 mg/5 mL (5 mL) syrup Take 5 mL by mouth every four (4) hours as needed for Cough. Max Daily Amount: 30 mL. 240 mL 1    potassium chloride SR (KLOR-CON 10) 10 mEq tablet Take 10 mEq by mouth daily.  sucralfate (CARAFATE) 100 mg/mL suspension Take 1 tsp by mouth four (4) times daily as needed.  metoclopramide HCl (REGLAN) 5 mg tablet Take 1 Tab by mouth every six (6) hours as needed for Nausea. 20 Tab 0        INTERDISCIPLINARY COLLABORATION: RN    After treatment position/precautions:  · Upright in bed  · RN notified    Total Treatment Duration:   Time In: 9080  Time Out: 1027 [facility].  MS Lexi, CCC-SLP

## 2021-01-09 NOTE — PROGRESS NOTES
Patient resting in bed with no s/s of pain or discomfort. This AM patient would answer questions, but now she just stares off. She is responsive to voice. Call light within reach and patient instructed to call if assistance is needed. Will continue to monitor.

## 2021-01-09 NOTE — PROGRESS NOTES
Hospitalist Progress Note    2021  Admit Date: 1/3/2021  7:56 PM   NAME: Kyaw Hernandez   :  1934   MRN:  [id number]   Attending: Shravan Solomon MD  PCP:  Keysha Campuzano MD    SUBJECTIVE:   Patient is an 81yo F wit hx dementia, chronic white who presented with COVID and decreased intake.   NAEON. Failed with speech again today  Appears more alert, still little to say  Denies dyspnea      PHYSICAL EXAM     Visit Vitals  BP (!) 169/95 (BP 1 Location: Left arm, BP Patient Position: At rest)   Pulse (P) 99   Temp (P) 98.2 °F (36.8 °C)   Resp (P) 20   Ht 5' 3\" (1.6 m)   Wt 59 kg (130 lb)   SpO2 94%   Breastfeeding No   BMI 23.03 kg/m²      Temp (24hrs), Av.1 °F (36.7 °C), Min:97.9 °F (36.6 °C), Max:98.2 °F (36.8 °C)    Oxygen Therapy  O2 Sat (%): 94 % (21 0745)  Pulse via Oximetry: 69 beats per minute (21 1212)  O2 Device: Nasal cannula (21)  O2 Flow Rate (L/min): 2 l/min (21 0338)    Intake/Output Summary (Last 24 hours) at 2021 1202  Last data filed at 2021 1006  Gross per 24 hour   Intake    Output 1000 ml   Net -1000 ml      General: Elderly, thin, lethargic  Lungs:  CTA Bilaterally. Heart:  Regular rate and rhythm,  No murmur, rub, or gallop  Abdomen: Soft, Non distended, Non tender, Positive bowel sounds  Extremities: No cyanosis, clubbing or edema  Neurologic:  Dementia, lethargy    XR CHEST PORT   Final Result   IMPRESSION: Minimal atelectasis or infiltrate in the left lung base. ASSESSMENT      Active Hospital Problems    Diagnosis Date Noted    UTI (urinary tract infection) 2021    COVID-19 virus infection 2021    Pulmonary hypertension ([address] 2017      Left ventricle: Systolic function was hyperdynamic. Ejection fraction was  estimated in the range of 70 % to 75 %. Septal flattening consistent with RV  pressure and volume overload There were no regional wall motion   abnormalities.      PAP est 65mmhg by TR     -  Right ventricle: The ventricle was markedly dilated. Systolic function was  reduced. Plan:    Covid/chronic respiratory failure  Clarified with son - was formerly on oxygen and they restarted it but at recent baseline has not been on any O2. Weaned back off, does not look like she has been actually hypoxic. Too weak/lethargic for PO intake - SLP following, has prn zofran which may help intake as well  Too weak again for PO trial. Will give more time to improve from COVID and maybe try speech again after zofran. Poor prognosis. s/p remdesivir  Stop rocephin for pyuria, cx with skin emma. May just be colonized.   Gentle hydration for poor intake  Dementia severe at baseline but able to converse on good days    Hypokalemia/hypomagnesemia  Replace IV    DVT Prophylaxis: lovenox  Dispo: pending    Signed By: Melisa Asif MD     January 9, 2021

## 2021-01-10 PROCEDURE — 65270000029 HC RM PRIVATE

## 2021-01-10 PROCEDURE — 74011250636 HC RX REV CODE- 250/636: Performed by: FAMILY MEDICINE

## 2021-01-10 PROCEDURE — 2709999900 HC NON-CHARGEABLE SUPPLY

## 2021-01-10 RX ADMIN — Medication 10 ML: at 21:16

## 2021-01-10 RX ADMIN — Medication 5 ML: at 17:01

## 2021-01-10 RX ADMIN — ENOXAPARIN SODIUM 30 MG: 30 INJECTION SUBCUTANEOUS at 18:35

## 2021-01-10 RX ADMIN — ENOXAPARIN SODIUM 30 MG: 30 INJECTION SUBCUTANEOUS at 05:52

## 2021-01-10 RX ADMIN — DEXTROSE MONOHYDRATE, SODIUM CHLORIDE, AND POTASSIUM CHLORIDE: 50; 4.5; 2.98 INJECTION, SOLUTION INTRAVENOUS at 00:30

## 2021-01-10 RX ADMIN — Medication 10 ML: at 05:53

## 2021-01-10 NOTE — PROGRESS NOTES
Bedside report received from night nurse Mark Gay. Assessment done as noted  Respiration even and unlabored 20/min; denies pain or nausea at present. Remains afebrile this morning. Non-productive coughing at interval. . O2 intact with 2 liters via nasal canula with O2 sats 93% at rest. Remains on Droplet plus isolation for positive COVID-19 screening. Ordered PPE in place and in use. Encouraged to call with needs.

## 2021-01-10 NOTE — PROGRESS NOTES
Patient resting in bed, confused but cooperative with care. Patient on 2 liters of Oxygen via NC. Patient IV in place, patent and infusing Dextrose 5% NACL with KCi 40 mEq/ L infusing at 75 ml/hr. Patient denies pain or distress, safety measures in place, call light within reach.

## 2021-01-11 LAB
ANION GAP SERPL CALC-SCNC: 4 MMOL/L (ref 7–16)
BUN SERPL-MCNC: 5 MG/DL (ref 8–23)
CALCIUM SERPL-MCNC: 7.9 MG/DL (ref 8.3–10.4)
CHLORIDE SERPL-SCNC: 106 MMOL/L (ref 98–107)
CO2 SERPL-SCNC: 26 MMOL/L (ref 21–32)
CREAT SERPL-MCNC: 0.52 MG/DL (ref 0.6–1)
GLUCOSE SERPL-MCNC: 115 MG/DL (ref 65–100)
MAGNESIUM SERPL-MCNC: 1.9 MG/DL (ref 1.8–2.4)
POTASSIUM SERPL-SCNC: 4.2 MMOL/L (ref 3.5–5.1)
SODIUM SERPL-SCNC: 136 MMOL/L (ref 136–145)

## 2021-01-11 PROCEDURE — 36415 COLL VENOUS BLD VENIPUNCTURE: CPT

## 2021-01-11 PROCEDURE — 80048 BASIC METABOLIC PNL TOTAL CA: CPT

## 2021-01-11 PROCEDURE — 74011250636 HC RX REV CODE- 250/636: Performed by: FAMILY MEDICINE

## 2021-01-11 PROCEDURE — 92526 ORAL FUNCTION THERAPY: CPT

## 2021-01-11 PROCEDURE — 83735 ASSAY OF MAGNESIUM: CPT

## 2021-01-11 PROCEDURE — 65270000029 HC RM PRIVATE

## 2021-01-11 RX ADMIN — Medication 10 ML: at 20:46

## 2021-01-11 RX ADMIN — Medication 10 ML: at 05:45

## 2021-01-11 RX ADMIN — Medication 10 ML: at 12:30

## 2021-01-11 RX ADMIN — ENOXAPARIN SODIUM 30 MG: 30 INJECTION SUBCUTANEOUS at 05:46

## 2021-01-11 RX ADMIN — DEXTROSE MONOHYDRATE, SODIUM CHLORIDE, AND POTASSIUM CHLORIDE: 50; 4.5; 2.98 INJECTION, SOLUTION INTRAVENOUS at 14:40

## 2021-01-11 RX ADMIN — ENOXAPARIN SODIUM 30 MG: 30 INJECTION SUBCUTANEOUS at 16:22

## 2021-01-11 RX ADMIN — ONDANSETRON 4 MG: 2 INJECTION INTRAMUSCULAR; INTRAVENOUS at 11:45

## 2021-01-11 NOTE — PROGRESS NOTES
Comprehensive Nutrition Assessment    Type and Reason for Visit: Initial, RD nutrition re-screen/LOS    Nutrition Recommendations/Plan:   Meals and Snacks:  Continue current diet. Progression per SLP. Nutrition Supplement Therapy:   Medical food supplement therapy:  Initiate Ensure Enlive three times per day (this provides 350 kcal and 20 grams protein per bottle)     Malnutrition Assessment:  Malnutrition Status: Insufficient data(at risk for malnutrition: acute decline in oral intake)    Nutrition Assessment:   Nutrition History: Poor po indicated for 4-5 days pta in H&P. Chronic nausea which son premedicates with zofran for meals at baseline. Cultural/Pentec[employer]alism/Ethnic Food Preference(s): None   Nutrition Background: hx advanced dementia, chronic white. Admited with Covid/chronic respiratory failure, poor intake/multifactorial, hypokalemia/hypomagnesemia. Daily Update:  Pt is reported to have good intake at times at home. Family declined GI eval \"hold off on further workup while recovering from covid and not wanting to have invasive workup or artificial nutrition\" per primary note. Abdominal Status (last documented): Intact abdomen with Active  bowel sounds. Last BM 01/04/21. Pertinent Medications: zofran PRN  IVF: D5 1/2NS with 40 meq KCL @ 75 ml/hr. Pertinent Labs: K 4.2, glu 115, BUN/Cr 5/0.52, Mg 1.9    Nutrition Related Findings:   NFPE deferred due to isolation. Pt initially on regular diet 1/4 transitioned to puree. NPO d/t alertness from 1/7 until SLP eval 1/11 starting full liquids.        Current Nutrition Therapies:  DIET FULL LIQUID    Current Intake:   Average Meal Intake: Unable to assess Average Supplement Intake: None ordered      Anthropometric Measures:  Height: 5' 3\" (160 cm)  Current Body Wt: 59 kg (130 lb 1.1 oz)(1/3), Weight source: Not specified  BMI: 23, Normal weight (BMI 22.0-24.9) age over 72  Admission Body Weight: 130 lb 1.1 oz(not specified)  Ideal Body Wt: 115 lbs (52 kg), 113.1 %  Usual Body Wt: (no current wt hx available in [facility], last values 2017), Percent weight change:            Estimated Daily Nutrient Needs:  Energy (kcal/day): 6877-6599 ((25-30 kcal/kg), Weight Used: Admission(59 kg))  Protein (g/day): 71-77 (1.2-1.3 g/kg) Weight Used: (Admission)  Fluid (ml/day):   (1 ml/kcal)    Nutrition Diagnosis:   · Inadequate oral intake related to cognitive or neurological impairment as evidenced by (advanced dementia, po <25% estimated needs. )    Nutrition Interventions:   Food and/or Nutrient Delivery: Continue current diet, Start oral nutrition supplement     Coordination of Nutrition Care: Continue to monitor while inpatient    Goals: Active Goal: Meet >50% estimated needs byt nutrition follow-up    Nutrition Monitoring and Evaluation:      Food/Nutrient Intake Outcomes: Food and nutrient intake, Supplement intake, Diet advancement/tolerance  Physical Signs/Symptoms Outcomes: Biochemical data    Discharge Planning:     Too soon to determine    Josh Leiva RD, LDN on 1/11/2021 at 4:45 PM  Contact: [phone]    Disaster Mode active

## 2021-01-11 NOTE — PROGRESS NOTES
LTG: Patient will tolerate least restrictive diet without overt signs or symptoms of airway compromise. STG: Patient will tolerate po trials without s/sx airway compromise in efforts to identify safest oral diet MET 1/11/21  STG: Patient will tolerate full liquids diet without s/sx of airway compromise ADDED 1/11/21  STG: Patient will participate in modified barium swallow study as clinically indicated. SPEECH LANGUAGE PATHOLOGY: DYSPHAGIA- Daily Note 2    NAME/AGE/GENDER: Alberto May is a 80 y.o. female  DATE: 1/11/2021  PRIMARY DIAGNOSIS: COVID-19 virus infection [U07.1]  UTI (urinary tract infection) [N39.0]      ICD-10: Treatment Diagnosis: R13.12 Dysphagia, Oropharyngeal Phase    RECOMMENDATIONS   DIET:    Full Liquid diet      MEDICATIONS: Crushed in puree     ASPIRATION PRECAUTIONS  · Slow rate of intake  · Small bites/sips  · Upright at 90 degrees during meal     COMPENSATORY STRATEGIES/MODIFICATIONS  · 1:1 assistance with meals  · Keep upright 30 minutes after meals. RECOMMENDATIONS for CONTINUED SPEECH THERAPY:   YES: Anticipate need for ongoing speech therapy during this hospitalization and at next level of care. ASSESSMENT   Patient with improved acceptance of po trials today, but continues to consume minimal amounts despite encouragement. No overt s/sx of airway compromise with approximately 2 ounces of thin liquids via straw and 2 ounces apple sauce. Delayed cough and attempts to bring of phlegm between po trials, but did not appear to be related to intake. Multiple swallows occasionally occurring after puree which may be indicative of impaired GI tolerance with po intake. Reports of patient vomiting after meds prior to arrival and dysphagia with solids. Please consider GI consult for ongoing working if in line with patient/family goals. Recommend initiate full liquid diet. Medications crushed in puree. 1:1 assistance. Please keep patient upright 30 minutes after meals. Anticipate minimal intake as she self-limits/only accepts minimal trials with encouragement during session. CONTINUATION OF SKILLED SERVICES/MEDICAL NECESSITY:   Patient is expected to demonstrate progress in  swallow strength, swallow timeliness, swallow function and swallow safety in order to  improve swallow safety, work toward diet advancement and decrease aspiration risk.  Patient continues to require skilled intervention due to possible dysphagia. EDUCATION:  · Recommendations discussed with Patient and RN  REHABILITATION POTENTIAL FOR STATED GOALS: Excellent    PLAN    FREQUENCY/DURATION: Continue to follow patient 3 times a week for duration of hospital stay to address above goals. - Recommendations for next treatment session: Next treatment will address po trials    SUBJECTIVE   Awake. Agreeable to po intake with encouragement. More agreeable to Pepsi than water. Oxygen Device: nasal canula 2L  Pain: Pain Scale 1: Numeric (0 - 10)  Pain Intensity 1: 0      Problem List:  (Impairments causing functional limitations):  1. Possible oropharyngeal dysphagia       Diet Prior to Evaluation: regular/thin liquids    Orientation:  Person  hospital    OBJECTIVE   Swallow treatment:   Patient agreeable to trials of pepsi and applesauce with encouragement. Congested, non-productive baseline cough prior to po trials. Thin liquids via straws consumed for total of 2 ounces without overt s/sx of airway compromise. No immediate cough or throat clear. Voice clear. However, delayed coughing noted >30 seconds after trials that is concerning for GI intolerance. Approximately 2 ounces of puree also consumed with moderate encouragement. Single swallows palpated initially and voice clear. After delay additional swallows observed which are concerning for poor pharyngeal clearance vs retroflow. All additional trials deferred by patient. Anticipate minimal intake with meals.         Tool Used: Dysphagia Outcome and Severity Scale (WADE)    Score Comments   Normal Diet  [] 7 With no strategies or extra time needed   Functional Swallow  [] 6 May have mild oral or pharyngeal delay   Mild Dysphagia  [] 5 Which may require one diet consistency restricted    Mild-Moderate Dysphagia  [] 4 With 1-2 diet consistencies restricted   Moderate Dysphagia  [] 3 With 2 or more diet consistencies restricted   Moderate-Severe Dysphagia  [] 2 With partial PO strategies (trials with ST only)   Severe Dysphagia  [] 1 With inability to tolerate any PO safely      Score:  Initial: 2 Most Recent: 2 (Date 01/11/21 )   Interpretation of Tool: The Dysphagia Outcome and Severity Scale (WADE) is a simple, easy-to-use, 7-point scale developed to systematically rate the functional severity of dysphagia based on objective assessment and make recommendations for diet level, independence level, and type of nutrition. Current Medications:   No current facility-administered medications on file prior to encounter. Current Outpatient Medications on File Prior to Encounter   Medication Sig Dispense Refill    furosemide (LASIX) 20 mg tablet Take 20 mg by mouth daily.  busPIRone (BUSPAR) 5 mg tablet Take 1 Tab by mouth daily. 30 Tab 2    sucralfate (CARAFATE) 100 mg/mL suspension Take 10 mL by mouth Before breakfast, lunch, dinner and at bedtime. 414 mL 1    hydrocortisone (ANUSOL-HC) 2.5 % rectal cream Insert 1 g into rectum two (2) times a day. 30 g 0    HYDROcodone-homatropine (HYCODAN) 5-1.5 mg/5 mL (5 mL) syrup Take 5 mL by mouth every four (4) hours as needed for Cough. Max Daily Amount: 30 mL. 240 mL 1    potassium chloride SR (KLOR-CON 10) 10 mEq tablet Take 10 mEq by mouth daily.  sucralfate (CARAFATE) 100 mg/mL suspension Take 1 tsp by mouth four (4) times daily as needed.  metoclopramide HCl (REGLAN) 5 mg tablet Take 1 Tab by mouth every six (6) hours as needed for Nausea.  20 Tab 0        INTERDISCIPLINARY COLLABORATION: RN    After treatment position/precautions:  · Upright in bed  · RN notified    Total Treatment Duration:   Time In: 6582  Time Out: [address] CCC-SLP

## 2021-01-11 NOTE — PROGRESS NOTES
Pt resting in bed comfortably at this time, alert and oriented to person. No distress noted, respirations even and unlabored on room air. Fluids infusing at 100. Will continue to monitor. Will prepare for bedside shift report. Bed alarm on.

## 2021-01-11 NOTE — PROGRESS NOTES
Hospitalist Progress Note    2021  Admit Date: 1/3/2021  7:56 PM   NAME: Arlene Kessler   :  1934   MRN:  [id number]   Attending: Lolita Millan MD  PCP:  Tha Vazquez MD    SUBJECTIVE:   Patient is an 79yo F wit hx dementia, chronic white who presented with COVID and decreased intake.   More alert and conversant today. Less lethargic. Denies dyspnea or nausea. Seen by speech. Intake may be limited by GI sx but not swallow mechanics/alertness      PHYSICAL EXAM     Visit Vitals  /79   Pulse 84   Temp 99.2 °F (37.3 °C)   Resp 20   Ht 5' 3\" (1.6 m)   Wt 59 kg (130 lb)   SpO2 96%   Breastfeeding No   BMI 23.03 kg/m²      Temp (24hrs), Av.2 °F (36.8 °C), Min:97.4 °F (36.3 °C), Max:99.2 °F (37.3 °C)    Oxygen Therapy  O2 Sat (%): 96 % (21 0800)  Pulse via Oximetry: 69 beats per minute (21 1212)  O2 Device: Nasal cannula (01/10/21 035)  O2 Flow Rate (L/min): 2 l/min (01/10/21 035)    Intake/Output Summary (Last 24 hours) at 2021 1018  Last data filed at 2021 0915  Gross per 24 hour   Intake 60 ml   Output 500 ml   Net -440 ml      General: Elderly, thin, chronically ill  Lungs:  CTA Bilaterally. Heart:  Regular rate and rhythm,  No murmur, rub, or gallop  Abdomen: Soft, Non distended, Non tender, Positive bowel sounds  Extremities: No cyanosis, clubbing or edema  Neurologic:  Dementia    XR CHEST PORT   Final Result   IMPRESSION: Minimal atelectasis or infiltrate in the left lung base. ASSESSMENT      Active Hospital Problems    Diagnosis Date Noted    UTI (urinary tract infection) 2021    COVID-19 virus infection 2021    Pulmonary hypertension ([address] 2017      Left ventricle: Systolic function was hyperdynamic. Ejection fraction was  estimated in the range of 70 % to 75 %. Septal flattening consistent with RV  pressure and volume overload There were no regional wall motion   abnormalities.      PAP est 65mmhg by TR     -  Right ventricle: The ventricle was markedly dilated. Systolic function was  reduced. Plan:    Covid/chronic respiratory failure  Clarified with son - was formerly on oxygen and they restarted it as she got ill but at baseline has not been on any O2. Weaned back off, does not look like she has been actually significantly hypoxic.   s/p remdesivir    Poor intake  Multifactorial  Has some chronic nausea, son premedicates with zofran for meals sometimes but overall despite advanced dementia has good intake at times. Worse with covid GI symptoms and lethargy. Both seem improving  Advance diet and use prn antiemetics to encourage PO intake  Speech recommends GI eval, discussed with sister and would like to hold off on further workup while recovering from covid and not wanting to have invasive workup or artificial nutrition    Stop rocephin for pyuria, cx with skin emma. May just be colonized.   Gentle hydration for poor intake  Dementia severe at baseline but able to converse on good days    Hypokalemia/hypomagnesemia  Replace IV - recheck labs still pending    DVT Prophylaxis: lovenox  Dispo: pending  Son is POA but he is ill currently with COVID so primary contact is the patient's sister at [phone]    Signed By: Julita Altamirano MD     January 11, 2021

## 2021-01-11 NOTE — PROGRESS NOTES
Pt resting in bed comfortably at this time, alert and oriented to self. No distress noted, respirations even and unlabored on room air. Pt denies pain at this time. Pt instructed to call for assistance if needed, call light in place, will continue to monitor. Bed alarm on. NPO at this time. Dextrose 5%-0.45% with KCL at 75.

## 2021-01-11 NOTE — PROGRESS NOTES
Covid precautions in place   did not visit with patient    Spoke with staff     Reviewed notes for spiritual concerns  PER NOTES:    [facility] offered for patient's healing and protection      STAFF IS PROVIDING VERY COMPASSIONATE CARE

## 2021-01-12 LAB
ANION GAP SERPL CALC-SCNC: 5 MMOL/L (ref 7–16)
BUN SERPL-MCNC: 6 MG/DL (ref 8–23)
CALCIUM SERPL-MCNC: 8.2 MG/DL (ref 8.3–10.4)
CHLORIDE SERPL-SCNC: 105 MMOL/L (ref 98–107)
CO2 SERPL-SCNC: 25 MMOL/L (ref 21–32)
CREAT SERPL-MCNC: 0.53 MG/DL (ref 0.6–1)
ERYTHROCYTE [DISTWIDTH] IN BLOOD BY AUTOMATED COUNT: 13.1 % (ref 11.9–14.6)
GLUCOSE SERPL-MCNC: 143 MG/DL (ref 65–100)
HCT VFR BLD AUTO: 36.2 % (ref 35.8–46.3)
HGB BLD-MCNC: 12.1 G/DL (ref 11.7–15.4)
MAGNESIUM SERPL-MCNC: 2 MG/DL (ref 1.8–2.4)
MCH RBC QN AUTO: 30 PG (ref 26.1–32.9)
MCHC RBC AUTO-ENTMCNC: 33.4 G/DL (ref 31.4–35)
MCV RBC AUTO: 89.8 FL (ref 79.6–97.8)
NRBC # BLD: 0 K/UL (ref 0–0.2)
PLATELET # BLD AUTO: 351 K/UL (ref 150–450)
PMV BLD AUTO: 11 FL (ref 9.4–12.3)
POTASSIUM SERPL-SCNC: 5 MMOL/L (ref 3.5–5.1)
RBC # BLD AUTO: 4.03 M/UL (ref 4.05–5.2)
SODIUM SERPL-SCNC: 135 MMOL/L (ref 136–145)
WBC # BLD AUTO: 11.3 K/UL (ref 4.3–11.1)

## 2021-01-12 PROCEDURE — 80048 BASIC METABOLIC PNL TOTAL CA: CPT

## 2021-01-12 PROCEDURE — 65270000029 HC RM PRIVATE

## 2021-01-12 PROCEDURE — 36415 COLL VENOUS BLD VENIPUNCTURE: CPT

## 2021-01-12 PROCEDURE — 74011250637 HC RX REV CODE- 250/637: Performed by: INTERNAL MEDICINE

## 2021-01-12 PROCEDURE — 74011250636 HC RX REV CODE- 250/636: Performed by: FAMILY MEDICINE

## 2021-01-12 PROCEDURE — 85027 COMPLETE CBC AUTOMATED: CPT

## 2021-01-12 PROCEDURE — 76450000000

## 2021-01-12 PROCEDURE — 83735 ASSAY OF MAGNESIUM: CPT

## 2021-01-12 RX ORDER — MIRTAZAPINE 15 MG/1
7.5 TABLET, FILM COATED ORAL
Status: DISCONTINUED | OUTPATIENT
Start: 2021-01-12 | End: 2021-01-13 | Stop reason: HOSPADM

## 2021-01-12 RX ADMIN — Medication 10 ML: at 05:14

## 2021-01-12 RX ADMIN — MIRTAZAPINE 7.5 MG: 15 TABLET, FILM COATED ORAL at 22:00

## 2021-01-12 RX ADMIN — Medication 10 ML: at 22:00

## 2021-01-12 RX ADMIN — DEXTROSE MONOHYDRATE, SODIUM CHLORIDE, AND POTASSIUM CHLORIDE: 50; 4.5; 2.98 INJECTION, SOLUTION INTRAVENOUS at 05:29

## 2021-01-12 RX ADMIN — DEXTROSE MONOHYDRATE, SODIUM CHLORIDE, AND POTASSIUM CHLORIDE: 50; 4.5; 2.98 INJECTION, SOLUTION INTRAVENOUS at 20:11

## 2021-01-12 RX ADMIN — ENOXAPARIN SODIUM 30 MG: 30 INJECTION SUBCUTANEOUS at 17:45

## 2021-01-12 RX ADMIN — Medication 10 ML: at 13:13

## 2021-01-12 RX ADMIN — ENOXAPARIN SODIUM 30 MG: 30 INJECTION SUBCUTANEOUS at 05:13

## 2021-01-12 NOTE — PROGRESS NOTES
Patient resting quietly in bed at this time. Respirations regular and unlabored. No distress noted at this time. Call light in reach. Will continue to monitor for needs.

## 2021-01-12 NOTE — PROGRESS NOTES
Hospitalist Progress Note    2021  Admit Date: 1/3/2021  7:56 PM   NAME: Colleen Lora   :  1934   MRN:  [id number]   Attending: Mica Willett DO  PCP:  Yady Brasher MD    SUBJECTIVE:   Patient is an 79yo F wit hx dementia, chronic white who presented with COVID and decreased intake. Interval History (): patient examined at bedside. No acute overnight events. Lethargic at bedside, very soft spoken when asking her questions. Some questions she will answer questions. Does not want anything to eat or drink. Denies being in pain. PHYSICAL EXAM     Visit Vitals  /84 (BP 1 Location: Left arm, BP Patient Position: At rest)   Pulse 92   Temp 98.2 °F (36.8 °C)   Resp 18   Ht 5' 3\" (1.6 m)   Wt 59 kg (130 lb)   SpO2 100%   Breastfeeding No   BMI 23.03 kg/m²      Temp (24hrs), Av.2 °F (36.8 °C), Min:98.1 °F (36.7 °C), Max:98.4 °F (36.9 °C)    Oxygen Therapy  O2 Sat (%): 100 % (21 1148)  Pulse via Oximetry: 69 beats per minute (21 1212)  O2 Device: Nasal cannula (01/10/21 0351)  O2 Flow Rate (L/min): 2 l/min (01/10/21 0351)    Intake/Output Summary (Last 24 hours) at 2021 1648  Last data filed at 2021 1215  Gross per 24 hour   Intake    Output 1001 ml   Net -1001 ml      General: Elderly, thin, chronically ill  Lungs:  CTA Bilaterally. Heart:  Regular rate and rhythm,  No murmur, rub, or gallop  Abdomen: Soft, Non distended, Non tender, Positive bowel sounds  Extremities: No cyanosis, clubbing or edema  Neurologic:  Dementia    XR CHEST PORT   Final Result   IMPRESSION: Minimal atelectasis or infiltrate in the left lung base. ASSESSMENT      Active Hospital Problems    Diagnosis Date Noted    UTI (urinary tract infection) 2021    COVID-19 virus infection 2021    Pulmonary hypertension ([address] 2017      Left ventricle: Systolic function was hyperdynamic. Ejection fraction was  estimated in the range of 70 % to 75 %. Septal flattening consistent with RV  pressure and volume overload There were no regional wall motion   abnormalities. PAP est 65mmhg by TR     -  Right ventricle: The ventricle was markedly dilated. Systolic function was  reduced. Plan:    # Acute hypoxic respiratory failure due to viral pneumonia from COVID  - wean supplemental oxygen as tolerated. - finished remdesivir   - noted rise in WBC count to 11  - recently stopped Rocephin    # Generalized weakness and adult failure to thrive  - likely multifactorial but due to above  - speech therapy following  - currently on IV fluids for hydration  - reportedly family does not want tube feedings, will get palliative medicine consult for goals of care discussion  - if able to swallow, can try low-dose Remeron 7.5 mg for both sleep aid and appetite stimulant  - continue antiemetics as needed  - speech therapy recommends GI evaluation but family wishes to hold off on invasive workup or artifical nutrition   - has known underlying dementia, this is only likely with superimposed delirium    F/E/N: IVF infusion, replete electrolytes as needed, diet recs per speech therapy consult    Ppx: Lovenox for VTE    Code Status: DNR    Disposition: pending clinical improvement with plan as above.  Need goals of care discussion regarding what the family thinks is best.     Son is POA but he is ill currently with COVID so primary contact is the patient's sister at [phone]    Signed By: Abel Bernheim, DO     January 12, 2021

## 2021-01-12 NOTE — PROGRESS NOTES
SPEECH PATHOLOGY NOTE:    Attempted to see patient for diet tolerance this AM. Patient resting and politely declined any po intake. Reports agreeable at later time. Will re attempt.        Sharmin Leavitt, [address] CCC-SLP

## 2021-01-12 NOTE — PROGRESS NOTES
Patient slept most of the night. Patient converses when asked questions. Respirations regular and no distress noted during the night. Waiting to report off to on coming nurse.

## 2021-01-13 VITALS
WEIGHT: 130 LBS | OXYGEN SATURATION: 99 % | DIASTOLIC BLOOD PRESSURE: 89 MMHG | BODY MASS INDEX: 23.04 KG/M2 | HEIGHT: 63 IN | SYSTOLIC BLOOD PRESSURE: 156 MMHG | HEART RATE: 102 BPM | TEMPERATURE: 98.7 F | RESPIRATION RATE: 20 BRPM

## 2021-01-13 LAB
ANION GAP SERPL CALC-SCNC: 4 MMOL/L (ref 7–16)
BASOPHILS # BLD: 0 K/UL (ref 0–0.2)
BASOPHILS NFR BLD: 0 % (ref 0–2)
BUN SERPL-MCNC: 4 MG/DL (ref 8–23)
CALCIUM SERPL-MCNC: 8.2 MG/DL (ref 8.3–10.4)
CHLORIDE SERPL-SCNC: 105 MMOL/L (ref 98–107)
CO2 SERPL-SCNC: 25 MMOL/L (ref 21–32)
CREAT SERPL-MCNC: 0.57 MG/DL (ref 0.6–1)
DIFFERENTIAL METHOD BLD: ABNORMAL
EOSINOPHIL # BLD: 0.2 K/UL (ref 0–0.8)
EOSINOPHIL NFR BLD: 2 % (ref 0.5–7.8)
ERYTHROCYTE [DISTWIDTH] IN BLOOD BY AUTOMATED COUNT: 13.2 % (ref 11.9–14.6)
GLUCOSE SERPL-MCNC: 133 MG/DL (ref 65–100)
HCT VFR BLD AUTO: 34 % (ref 35.8–46.3)
HGB BLD-MCNC: 11 G/DL (ref 11.7–15.4)
IMM GRANULOCYTES # BLD AUTO: 0.1 K/UL (ref 0–0.5)
IMM GRANULOCYTES NFR BLD AUTO: 1 % (ref 0–5)
LYMPHOCYTES # BLD: 1.1 K/UL (ref 0.5–4.6)
LYMPHOCYTES NFR BLD: 13 % (ref 13–44)
MCH RBC QN AUTO: 29.8 PG (ref 26.1–32.9)
MCHC RBC AUTO-ENTMCNC: 32.4 G/DL (ref 31.4–35)
MCV RBC AUTO: 92.1 FL (ref 79.6–97.8)
MONOCYTES # BLD: 1.1 K/UL (ref 0.1–1.3)
MONOCYTES NFR BLD: 13 % (ref 4–12)
NEUTS SEG # BLD: 6.5 K/UL (ref 1.7–8.2)
NEUTS SEG NFR BLD: 72 % (ref 43–78)
NRBC # BLD: 0 K/UL (ref 0–0.2)
PLATELET # BLD AUTO: 302 K/UL (ref 150–450)
PMV BLD AUTO: 11 FL (ref 9.4–12.3)
POTASSIUM SERPL-SCNC: 4.5 MMOL/L (ref 3.5–5.1)
RBC # BLD AUTO: 3.69 M/UL (ref 4.05–5.2)
SODIUM SERPL-SCNC: 134 MMOL/L (ref 136–145)
WBC # BLD AUTO: 9 K/UL (ref 4.3–11.1)

## 2021-01-13 PROCEDURE — 74011250636 HC RX REV CODE- 250/636: Performed by: INTERNAL MEDICINE

## 2021-01-13 PROCEDURE — 85025 COMPLETE CBC W/AUTO DIFF WBC: CPT

## 2021-01-13 PROCEDURE — 99223 1ST HOSP IP/OBS HIGH 75: CPT | Performed by: NURSE PRACTITIONER

## 2021-01-13 PROCEDURE — 92526 ORAL FUNCTION THERAPY: CPT

## 2021-01-13 PROCEDURE — 74011250636 HC RX REV CODE- 250/636: Performed by: FAMILY MEDICINE

## 2021-01-13 PROCEDURE — 80048 BASIC METABOLIC PNL TOTAL CA: CPT

## 2021-01-13 RX ORDER — SODIUM CHLORIDE 9 MG/ML
75 INJECTION, SOLUTION INTRAVENOUS CONTINUOUS
Status: DISCONTINUED | OUTPATIENT
Start: 2021-01-13 | End: 2021-01-13 | Stop reason: HOSPADM

## 2021-01-13 RX ADMIN — Medication 10 ML: at 11:58

## 2021-01-13 RX ADMIN — ENOXAPARIN SODIUM 30 MG: 30 INJECTION SUBCUTANEOUS at 16:56

## 2021-01-13 RX ADMIN — ENOXAPARIN SODIUM 30 MG: 30 INJECTION SUBCUTANEOUS at 06:00

## 2021-01-13 RX ADMIN — Medication 10 ML: at 06:00

## 2021-01-13 RX ADMIN — SODIUM CHLORIDE 75 ML/HR: 9 INJECTION, SOLUTION INTRAVENOUS at 08:00

## 2021-01-13 NOTE — DISCHARGE SUMMARY
Hospitalist Discharge Summary     Admit Date:  1/3/2021  7:56 PM   DC note date: 2021  Name:  Kyaw Hernandez   Age:  80 y.o.  :  1934   MRN:  [id number]   PCP:  Keysha Campuzano MD  Treatment Team: Attending Provider: Manfred Jackson MD; Care Manager: Bhakti Perez.; Utilization Review: Anjana Peralta Primary Nurse: Austen Cash RN; Primary Nurse: Amanda Hurtado J    Problem List for this Hospitalization:  Hospital Problems as of 2021 Date Reviewed: 2017          Codes Class Noted - Resolved POA    UTI (urinary tract infection) ICD-10-CM: N39.0  ICD-9-CM: 599.0  2021 - Present Yes        * (Principal) COVID-19 virus infection ICD-10-CM: U07.1  ICD-9-CM: 079.89  2021 - Present Yes        Pulmonary hypertension ([address] (Chronic) ICD-10-CM: I27.20  ICD-9-CM: 416.8  2017 - Present Yes    Overview Signed 2017 12:41 PM by Ramón Bajwa MD      Left ventricle: Systolic function was hyperdynamic. Ejection fraction was  estimated in the range of 70 % to 75 %. Septal flattening consistent with RV  pressure and volume overload There were no regional wall motion   abnormalities. PAP est 65mmhg by TR     -  Right ventricle: The ventricle was markedly dilated. Systolic function was  reduced. Admission HPI from 1/3/2021:    \"  Patient history was obtained from the ER provider prior to seeing the patient.     Patient is a 80 y.o. female who presents to the ER from home due to positive Covid test and associated symptoms. Several family members are Covid positive. Family reported to the ER the patient's not been eating or drinking well for about 4 days. She actually was on Bactrim for a UTI last week. She wears oxygen at 4 L/min chronically. Patient has advanced dementia and is nonverbal.  My history is obtained from the ER staff and records.   ER work-up shows her to also have a urinary tract infection.     ROS:  Pt unable to answer due to dementia and/or altered mental status. \"    Hospital Course:  Admitted with UTI, COVID. Pt was given appropriate treatments but had generalized decline, likely rooted in significant dementia and age. Due to failure to improve/thrive, family opted for home hospice today. Disposition: Home Hospice  Activity: Activity as tolerated  Diet: DIET FULL LIQUID  DIET NUTRITIONAL SUPPLEMENTS All Meals; Ensure Enlive ( )  Code Status: DNR    Follow Up Orders:  No orders of the defined types were placed in this encounter. Follow-up Information     [facility]  [facility] [address] [facility]  [phone]          Discharge meds at bottom of this note. Plan was discussed with pt. All questions answered. Patient was stable at time of discharge. Given instructions to call a physician or return if any concerns. Discharge summary and encounter summary was sent to PCP electronically via \"Comm Mgt\" link in [facility], if possible. Diagnostic Imaging/Tests:   Xr Chest Port    Result Date: 1/3/2021  PORTABLE CHEST 1 VIEW HISTORY:  COVID-19; lack of by mouth intake. COMPARISON: 2/14/2017 FINDINGS: There is minimal atelectasis or infiltrate in the left lung base. There is a treated lower thoracic or upper lumbar compression deformity. IMPRESSION: Minimal atelectasis or infiltrate in the left lung base. Echocardiogram/EKG results:  No results found for this visit on 01/03/21. Results for orders placed or performed during the hospital encounter of 01/03/21   EKG, 12 LEAD, INITIAL   Result Value Ref Range    Ventricular Rate 83 BPM    Atrial Rate 83 BPM    P-R Interval 190 ms    QRS Duration 62 ms    Q-T Interval 378 ms    QTC Calculation (Bezet) 444 ms    Calculated P Axis 74 degrees    Calculated R Axis -56 degrees    Calculated T Axis 66 degrees    Diagnosis       !! AGE AND GENDER SPECIFIC ECG ANALYSIS !!   Normal sinus rhythm  Left axis deviation  Inferior infarct , age undetermined  Anterior infarct , age undetermined  Abnormal ECG  When compared with ECG of 10-FEB-2017 16:11,  Significant changes have occurred  Confirmed by SHERIDAN SPRAGUE (), Ralf Gonzalez ([id number]) on 1/4/2021 9:24:30 AM         Procedures done this admission:  * No surgery found *    All Micro Results     Procedure Component Value Units Date/Time    BLOOD CULTURE [[id number]] Collected: 01/03/21 2042    Order Status: Completed Specimen: Blood Updated: 01/08/21 1020     Special Requests: --        RIGHT  FOREARM       Culture result: NO GROWTH 5 DAYS       BLOOD CULTURE [[id number]] Collected: 01/03/21 2206    Order Status: Completed Specimen: Blood Updated: 01/08/21 1020     Special Requests: --        NO SPECIAL REQUESTS  LEFT  HAND       Culture result: NO GROWTH 5 DAYS       CULTURE, URINE [[id number]] Collected: 01/03/21 2315    Order Status: Completed Specimen: Cath Urine Updated: 01/07/21 0650     Special Requests: NO SPECIAL REQUESTS        Culture result:       50,000-100,000 COLONIES/mL MIXED SKIN AMBREEN ISOLATED          INFLUENZA  RAPID [[id number]] Collected: 01/04/21 0342    Order Status: Completed Specimen: Nasopharyngeal from Nasal washing Updated: 01/04/21 0430     Influenza A Ag Negative        Comment: NEGATIVE FOR THE PRESENCE OF INFLUENZA A ANTIGEN  INFECTION DUE TO INFLUENZA A CANNOT BE RULED OUT. BECAUSE THE ANTIGEN PRESENT IN THE SAMPLE MAY BE BELOW  THE DETECTION LIMIT OF THE TEST. A NEGATIVE TEST IS PRESUMPTIVE AND IT IS RECOMMENDED THAT THESE RESULTS BE CONFIRMED BY VIRAL CULTURE OR AN FDA-CLEARED INFLUENZA A AND B MOLECULAR ASSAY. Influenza B Ag Negative        Comment: NEGATIVE FOR THE PRESENCE OF INFLUENZA B ANTIGEN  INFECTION DUE TO INFLUENZA B CANNOT BE RULED OUT. BECAUSE THE ANTIGEN PRESENT IN THE SAMPLE MAY BE BELOW  THE DETECTION LIMIT OF THE TEST.   A NEGATIVE TEST IS PRESUMPTIVE AND IT IS RECOMMENDED THAT THESE RESULTS BE CONFIRMED BY VIRAL CULTURE OR AN FDA-CLEARED INFLUENZA A AND B MOLECULAR ASSAY. Source Nasopharyngeal             SARS-CoV-2 Lab Results  \"Novel Coronavirus\" Test: No results found for: COV2NT   \"Emergent Disease\" Test: No results found for: EDPR  \"SARS-COV-2\" Test: No results found for: XGCOVT  Rapid Test:   Lab Results   Component Value Date/Time    COVR Detected (AA) 01/03/2021 08:47 PM            Labs: Results:       BMP, Mg, Phos Recent Labs     01/13/21 0428 01/12/21  0709 01/11/21  0845   * 135* 136   K 4.5 5.0 4.2    105 106   CO2 25 25 26   AGAP 4* 5* 4*   BUN 4* 6* 5*   CREA 0.57* 0.53* 0.52*   CA 8.2* 8.2* 7.9*   * 143* 115*   MG  --  2.0 1.9      CBC Recent Labs     01/13/21 0428 01/12/21  0709   WBC 9.0 11.3*   RBC 3.69* 4.03*   HGB 11.0* 12.1   HCT 34.0* 36.2    351   GRANS 72  --    LYMPH 13  --    EOS 2  --    MONOS 13*  --    BASOS 0  --    IG 1  --    ANEU 6.5  --    ABL 1.1  --    ASHIA 0.2  --    ABM 1.1  --    ABB 0.0  --    AIG 0.1  --       LFT No results for input(s): ALT, TBIL, AP, TP, ALB, GLOB, AGRAT in the last 72 hours.     No lab exists for component: SGOT, GPT   Cardiac Testing Lab Results   Component Value Date/Time     02/14/2017 05:40 PM    BNP 1,365 02/10/2017 06:00 AM    BNP 1,349 02/09/2017 11:35 AM    Troponin-I, Qt. 0.03 01/30/2017 01:54 PM      Coagulation Tests No results found for: PTP, INR, APTT, INREXT   A1c No results found for: HBA1C, HGBE8, FGM8NCNV   Lipid Panel No results found for: CHOL, CHOLPOCT, CHOLX, CHLST, CHOLV, 266931, HDL, HDLP, LDL, LDLC, DLDLP, 075530, VLDLC, VLDL, TGLX, TRIGL, TRIGP, TGLPOCT, CHHD, [facility]   Thyroid Panel Lab Results   Component Value Date/Time    TSH 1.830 01/30/2017 01:54 PM        Most Recent UA Lab Results   Component Value Date/Time    Color NORMA 02/05/2017 09:30 PM    Appearance CLOUDY 02/05/2017 09:30 PM    Specific gravity 1.020 02/05/2017 09:30 PM    pH (UA) 6.0 02/05/2017 09:30 PM    Protein NEGATIVE 02/05/2017 09:30 PM    Glucose NEGATIVE  02/05/2017 09:30 PM    Ketone NEGATIVE  02/05/2017 09:30 PM    Bilirubin SMALL (A) 02/05/2017 09:30 PM    Blood NEGATIVE  02/05/2017 09:30 PM    Urobilinogen 0.2 02/05/2017 09:30 PM    Nitrites NEGATIVE  02/05/2017 09:30 PM    Leukocyte Esterase NEGATIVE  02/05/2017 09:30 PM    WBC >100 01/03/2021 11:15 PM    RBC 20-50 01/03/2021 11:15 PM    Epithelial cells 0-3 01/03/2021 11:15 PM    Bacteria 2+ (H) 01/03/2021 11:15 PM    Casts 0 01/03/2021 11:15 PM    Crystals, urine 0 01/03/2021 11:15 PM    Mucus TRACE 01/03/2021 11:15 PM    Other observations RESULTS VERIFIED MANUALLY 01/03/2021 11:15 PM        Allergies   Allergen Reactions    Aspirin Nausea and Vomiting    Pcn [Penicillins] Itching     Immunization History   Administered Date(s) Administered    Influenza High Dose Vaccine PF 01/01/2017       All Labs from Last 24 Hrs:  Recent Results (from the past 24 hour(s))   CBC WITH AUTOMATED DIFF    Collection Time: 01/13/21  4:28 AM   Result Value Ref Range    WBC 9.0 4.3 - 11.1 K/uL    RBC 3.69 (L) 4.05 - 5.2 M/uL    HGB 11.0 (L) 11.7 - 15.4 g/dL    HCT 34.0 (L) 35.8 - 46.3 %    MCV 92.1 79.6 - 97.8 FL    MCH 29.8 26.1 - 32.9 PG    MCHC 32.4 31.4 - 35.0 g/dL    RDW 13.2 11.9 - 14.6 %    PLATELET 644 164 - 148 K/uL    MPV 11.0 9.4 - 12.3 FL    ABSOLUTE NRBC 0.00 0.0 - 0.2 K/uL    DF AUTOMATED      NEUTROPHILS 72 43 - 78 %    LYMPHOCYTES 13 13 - 44 %    MONOCYTES 13 (H) 4.0 - 12.0 %    EOSINOPHILS 2 0.5 - 7.8 %    BASOPHILS 0 0.0 - 2.0 %    IMMATURE GRANULOCYTES 1 0.0 - 5.0 %    ABS. NEUTROPHILS 6.5 1.7 - 8.2 K/UL    ABS. LYMPHOCYTES 1.1 0.5 - 4.6 K/UL    ABS. MONOCYTES 1.1 0.1 - 1.3 K/UL    ABS. EOSINOPHILS 0.2 0.0 - 0.8 K/UL    ABS. BASOPHILS 0.0 0.0 - 0.2 K/UL    ABS. IMM.  GRANS. 0.1 0.0 - 0.5 K/UL   METABOLIC PANEL, BASIC    Collection Time: 01/13/21  4:28 AM   Result Value Ref Range    Sodium 134 (L) 136 - 145 mmol/L    Potassium 4.5 3.5 - 5.1 mmol/L    Chloride 105 98 - 107 mmol/L    CO2 25 21 - 32 mmol/L    Anion gap 4 (L) 7 - 16 mmol/L    Glucose 133 (H) 65 - 100 mg/dL    BUN 4 (L) 8 - 23 MG/DL    Creatinine 0.57 (L) 0.6 - 1.0 MG/DL    GFR est AA >60 >60 ml/min/1.73m2    GFR est non-AA >60 >60 ml/min/1.73m2    Calcium 8.2 (L) 8.3 - 10.4 MG/DL       Discharge Exam:  Patient Vitals for the past 24 hrs:   Temp Pulse Resp BP SpO2   01/13/21 1201 99 °F (37.2 °C) (!) 101 18 (!) 155/76 97 %   01/13/21 0529 98.8 °F (37.1 °C) 88 18 (!) 148/72    01/13/21 0315 98.8 °F (37.1 °C) 88 18 (!) 148/72 99 %   01/12/21 2335 99.3 °F (37.4 °C) 89 18 (!) 147/77 100 %   01/12/21 1947 99.1 °F (37.3 °C) 80 18 132/65 98 %   01/12/21 1655 99 °F (37.2 °C) 97 18 (!) 144/78 98 %     Oxygen Therapy  O2 Sat (%): 97 % (01/13/21 1201)  Pulse via Oximetry: 69 beats per minute (01/07/21 1212)  O2 Device: Nasal cannula (01/12/21 1947)  O2 Flow Rate (L/min): 4 l/min (01/12/21 1947)    Estimated body mass index is 23.03 kg/m² as calculated from the following:    Height as of this encounter: 5' 3\" (1.6 m). Weight as of this encounter: 59 kg (130 lb). Intake/Output Summary (Last 24 hours) at 1/13/2021 1259  Last data filed at 1/13/2021 0315  Gross per 24 hour   Intake 200 ml   Output 700 ml   Net -500 ml       *Note that automatically entered I/Os may not be accurate; dependent on patient compliance with collection and accurate  by assistants. General: Frail appearing. No overt distress  Eyes:   Normal sclerae. Extraocular movements intact. ENT:  Normocephalic, atraumatic. Moist mucous membranes  CV:   Regular rate and rhythm. No edema. Lungs:  Even, Unlabored  Abdomen: nondistended. Extremities: Warm and dry. No cyanosis or clubbing  Neurologic: CN II-XII grossly intact. No gross focal deficits. Alert. Skin:     No rashes. No jaundice.       Current Med List in Hospital:   Current Facility-Administered Medications   Medication Dose Route Frequency    0.9% sodium chloride infusion  75 mL/hr IntraVENous CONTINUOUS    mirtazapine (REMERON) tablet 7.5 mg  7.5 mg Oral QHS    sodium chloride (NS) flush 5-40 mL  5-40 mL IntraVENous Q8H    sodium chloride (NS) flush 5-40 mL  5-40 mL IntraVENous PRN    bisacodyL (DULCOLAX) tablet 5 mg  5 mg Oral DAILY PRN    enoxaparin (LOVENOX) injection 30 mg  30 mg SubCUTAneous Q12H    acetaminophen (TYLENOL) tablet 650 mg  650 mg Oral Q6H PRN    Or    acetaminophen (TYLENOL) suppository 650 mg  650 mg Rectal Q6H PRN    ondansetron (ZOFRAN) injection 4 mg  4 mg IntraVENous Q4H PRN    0.9% sodium chloride infusion 250 mL  250 mL IntraVENous PRN       Discharge Info:   Current Discharge Medication List      STOP taking these medications       furosemide (LASIX) 20 mg tablet Comments:   Reason for Stopping:         busPIRone (BUSPAR) 5 mg tablet Comments:   Reason for Stopping:         sucralfate (CARAFATE) 100 mg/mL suspension Comments:   Reason for Stopping:         hydrocortisone (ANUSOL-HC) 2.5 % rectal cream Comments:   Reason for Stopping:         HYDROcodone-homatropine (HYCODAN) 5-1.5 mg/5 mL (5 mL) syrup Comments:   Reason for Stopping:         potassium chloride SR (KLOR-CON 10) 10 mEq tablet Comments:   Reason for Stopping:         sucralfate (CARAFATE) 100 mg/mL suspension Comments:   Reason for Stopping:         metoclopramide HCl (REGLAN) 5 mg tablet Comments:   Reason for Stopping:                 Time spent in patient discharge planning and coordination 35 minutes.     Signed:  Junaid Ordonez MD

## 2021-01-13 NOTE — PROGRESS NOTES
LTG: Patient will tolerate least restrictive diet without overt signs or symptoms of airway compromise. STG: Patient will tolerate po trials without s/sx airway compromise in efforts to identify safest oral diet MET 1/11/21  STG: Patient will tolerate full liquids diet without s/sx of airway compromise ADDED 1/11/21  STG: Patient will participate in modified barium swallow study as clinically indicated. SPEECH LANGUAGE PATHOLOGY: DYSPHAGIA- Daily Note 3    NAME/AGE/GENDER: Any Warner is a 80 y.o. female  DATE: 1/13/2021  PRIMARY DIAGNOSIS: COVID-19 virus infection [U07.1]  UTI (urinary tract infection) [N39.0]      ICD-10: Treatment Diagnosis: R13.12 Dysphagia, Oropharyngeal Phase    RECOMMENDATIONS   DIET:    Full Liquid diet      MEDICATIONS: Crushed in puree     ASPIRATION PRECAUTIONS  · Slow rate of intake  · Small bites/sips  · Upright at 90 degrees during meal     COMPENSATORY STRATEGIES/MODIFICATIONS  · 1:1 assistance with meals  · Keep upright 30 minutes after meals  · Hold if s/sx intolerance Coughing/Nausea/Vomitting      RECOMMENDATIONS for CONTINUED SPEECH THERAPY:   YES: Anticipate need for ongoing speech therapy during this hospitalization and at next level of care. ASSESSMENT   Patient with ongoing delayed harsh coughing episodes. Continues to be difficult to assess given limited intake before coughing/gagging and bringing up mucous. Per chart, family does not wish for alternate means of nutrition and does not wish to pursue any GI workup at this time. Unable to complete modified barium swallow study while on isolation. She remains at high risk of aspiration, but given family wishes, will continue an oral diet at this time. Recommend continue liquids. Medications crushed in puree. Anticipate poor intake due to the above. 1:1 assistance. Please keep patient upright 30 minutes after meals.        CONTINUATION OF SKILLED SERVICES/MEDICAL NECESSITY:   Patient is expected to demonstrate progress in  swallow strength, swallow timeliness, swallow function and swallow safety in order to  improve swallow safety, work toward diet advancement and decrease aspiration risk.  Patient continues to require skilled intervention due to possible dysphagia. EDUCATION:  · Recommendations discussed with Patient and RN  REHABILITATION POTENTIAL FOR STATED GOALS: Excellent    PLAN    FREQUENCY/DURATION: Continue to follow patient 3 times a week for duration of hospital stay to address above goals. - Recommendations for next treatment session: Next treatment will address po trials    SUBJECTIVE   Awake. Inconsistently answers questions. Reports feeling \"ok\" today. Oxygen Device: nasal canula 2L  Pain: Pain Scale 1: Numeric (0 - 10)  Pain Intensity 1: 0      Problem List:  (Impairments causing functional limitations):  1. Possible oropharyngeal dysphagia       Orientation:  Person  hospital    OBJECTIVE   Swallow treatment:   Po trials/tolerance. Patient tolerating ~4 sips of thin liquid by straw then demonstrated harsh coughing/gagging and bringing up mucous requiring some suctioning. Declined more trials.         Tool Used: Dysphagia Outcome and Severity Scale (WADE)    Score Comments   Normal Diet  [] 7 With no strategies or extra time needed   Functional Swallow  [] 6 May have mild oral or pharyngeal delay   Mild Dysphagia  [] 5 Which may require one diet consistency restricted    Mild-Moderate Dysphagia  [] 4 With 1-2 diet consistencies restricted   Moderate Dysphagia  [] 3 With 2 or more diet consistencies restricted   Moderate-Severe Dysphagia  [] 2 With partial PO strategies (trials with ST only)   Severe Dysphagia  [] 1 With inability to tolerate any PO safely      Score:  Initial: 2 Most Recent: 2 (Date 01/13/21 )   Interpretation of Tool: The Dysphagia Outcome and Severity Scale (WADE) is a simple, easy-to-use, 7-point scale developed to systematically rate the functional severity of dysphagia based on objective assessment and make recommendations for diet level, independence level, and type of nutrition.        INTERDISCIPLINARY COLLABORATION: CNA    After treatment position/precautions:  · Upright in bed  · CNA notified    Total Treatment Duration:   Time In: 9893  Time Out: [address], [facility], [address] [facility]

## 2021-01-13 NOTE — PROGRESS NOTES
SBAR received from [employer] pt in bed rr are even and unlabored O2 in place lung sounds are diminished no distress noted. abd is soft bowel sounds are active. Pt has redness to bottom cream applied. [place] in place patent and draining. Safety measures in place will continue to monitor.

## 2021-01-13 NOTE — CONSULTS
Palliative Care    Patient: Elizabeth Saravia MRN: [id number]  SSN: [id number]    YOB: 1934  Age: 80 y.o. Sex: female       Date of Request: 1/12/2021  Date of Consult:  1/13/2021  Reason for Consult:  goals of care  Requesting Physician: Dr Angella Carlos     Assessment/Plan:     Principal Diagnosis:    Anorexia  R63.0    Additional Diagnoses:   Debility, Unspecified  R53.81  Dementia  F03.90   Failure to Thrive  R62.7  Frailty  R54  Counseling, Encounter for Medical Advice  Z71.9  Encounter for Palliative Care  Z51.5    Palliative Performance Scale (PPS):       Medical Decision Making:   Reviewed and summarized notes from admission to present   Discussed case with appropriate providers- KRYSTAL De Los Santos  Reviewed laboratory and x-ray data from admission to present     Pt sleeping, no distress noted. Due to advanced dementia, I did not wake her. Spoke with pt's son, Bernadine Villanueva, and discussed discharge plan. Pt was at home with [address] prior to admission, and Bernadine Villanueva wants pt to return home with their services. His only concern is that he and his wife, as well as pt's paid caregiver, are recovering from COVID-19 and he is unsure if they can provide adequate care for a few days. Bernadine Villanueva did voice some concern about pt's poor appetite. Counseled that decreased appetite is normal with advancing dementia, advancing age, serious illness. Encouraged son to offer food/liquids, and allow pt to eat when she wants. Discussed with KRYSTAL Holguin. We will not plan to follow. Will discuss findings with members of the interdisciplinary team.      Thank you for this referral.          .    Subjective:     History obtained from:  Family, Care Provider and Chart    Chief Complaint: COVID-19, anorexia   History of Present Illness:  Ms Skyler Cheung is an 81 yo  female with PMH of CAD, dementia, GERD, and debility, who presented to the ER from home on 1/4/2021 with c/o decreased appetite for 4 days.   She had been tested for COVID-19, and received a positive result the day prior. Work up revealed UTI and hypoxia. Pt was admitted for further management. She has been lethargic, but does answer some questions. She refuses to participate with SLP evaluation, and has not been eating or drinking. Advance Directive: No       Code Status:  DNR            Health Care Power of : No - Patient does not have a [address]. Past Medical History:   Diagnosis Date    Arrhythmia     by history/ not at present    CAD (coronary artery disease)     mitral valve prolapse    Chronic pain     Esophagitis determined by endoscopy 2/7/2017    Gastrointestinal disorder     GERD    Gastrointestinal disorder     gallstones    GERD (gastroesophageal reflux disease)       Past Surgical History:   Procedure Laterality Date    FLEXIBLE SIGMOIDOSCOPY N/A 2/7/2017    SIGMOIDOSCOPY FLEXIBLE at bedside performed by Valerie Morgan MD at [facility]    HX HYSTERECTOMY      [place] BREAST SURGERY PROCEDURE UNLISTED      lumpectomy on RT breast     Family History   Problem Relation Age of Onset    Diabetes Mother     Hypertension Mother     Breast Cancer Neg Hx       Social History     Tobacco Use    Smoking status: Never Smoker   Substance Use Topics    Alcohol use: No     Prior to Admission medications    Medication Sig Start Date End Date Taking? Authorizing Provider   furosemide (LASIX) 20 mg tablet Take 20 mg by mouth daily. Yes Provider, Historical   busPIRone (BUSPAR) 5 mg tablet Take 1 Tab by mouth daily. 5/11/17   Tha Vazquez MD   sucralfate (CARAFATE) 100 mg/mL suspension Take 10 mL by mouth Before breakfast, lunch, dinner and at bedtime. 2/17/17   Elvia Rodrigues NP   hydrocortisone (ANUSOL-HC) 2.5 % rectal cream Insert 1 g into rectum two (2) times a day.  2/17/17   Elvia Rodrigues NP   HYDROcodone-homatropine (HYCODAN) 5-1.5 mg/5 mL (5 mL) syrup Take 5 mL by mouth every four (4) hours as needed for Cough. Max Daily Amount: 30 mL. 2/17/17   Elvia Barnett C, NP   potassium chloride SR (KLOR-CON 10) 10 mEq tablet Take 10 mEq by mouth daily. Provider, Historical   sucralfate (CARAFATE) 100 mg/mL suspension Take 1 tsp by mouth four (4) times daily as needed. Provider, Historical   metoclopramide HCl (REGLAN) 5 mg tablet Take 1 Tab by mouth every six (6) hours as needed for Nausea. 1/30/17   Thomas Fortunato, DO       Allergies   Allergen Reactions    Aspirin Nausea and Vomiting    Pcn [Penicillins] Itching        Review of Systems:  Review of systems not obtained due to patient factors- sleeping      Objective:     Visit Vitals  BP (!) 148/72   Pulse 88   Temp 98.8 °F (37.1 °C)   Resp 18   Ht 5' 3\" (1.6 m)   Wt 130 lb (59 kg)   SpO2 99%   Breastfeeding No   BMI 23.03 kg/m²        Physical Exam:    General:  Sleeping. Debilitated and frail. No acute distress. Eyes:  Conjunctivae/corneas clear    Nose: Nares normal. Septum midline. Neck: Supple, symmetrical, trachea midline   Lungs:   unlabored   Heart:  Regular rate and rhythm   Abdomen:   Soft, non-tender, non-distended   Extremities: Normal, atraumatic, no cyanosis or edema   Skin: Skin color, texture, turgor normal.    Neurologic: Nonfocal   Psych: Sleeping       Assessment:     Hospital Problems  Date Reviewed: 2/9/2017          Codes Class Noted POA    UTI (urinary tract infection) ICD-10-CM: N39.0  ICD-9-CM: 599.0  1/4/2021 Yes        * (Principal) COVID-19 virus infection ICD-10-CM: U07.1  ICD-9-CM: 079.89  1/4/2021 Yes        Pulmonary hypertension (HCC) (Chronic) ICD-10-CM: I27.20  ICD-9-CM: 416.8  2/12/2017 Yes    Overview Signed 2/12/2017 12:41 PM by Hussein Ansari MD      Left ventricle: Systolic function was hyperdynamic. Ejection fraction was  estimated in the range of 70 % to 75 %. Septal flattening consistent with RV  pressure and volume overload There were no regional wall motion   abnormalities.      PAP est 65mmhg by TR     -  Right ventricle: The ventricle was markedly dilated. Systolic function was  reduced.                    Signed By: Karol Ortega, SHANT     January 13, 2021

## 2021-01-13 NOTE — PROGRESS NOTES
Pt sleeping in bed. Foot drop bilaterally. D5 1/2 NS ordered and will resuyme drip when new bag is available. Chronic white draining and patent. 4LNC. Bed low and locked. Call light within reach.

## 2021-01-13 NOTE — PROGRESS NOTES
Problem: Falls - Risk of  Goal: *Absence of Falls  Description: Document Yamilet Sanz Fall Risk and appropriate interventions in the flowsheet.   Outcome: Progressing Towards Goal  Note: Fall Risk Interventions:       Mentation Interventions: Adequate sleep, hydration, pain control    Medication Interventions: Evaluate medications/consider consulting pharmacy    Elimination Interventions: Call light in reach, Bed/chair exit alarm              Problem: Patient Education: Go to Patient Education Activity  Goal: Patient/Family Education  Outcome: Progressing Towards Goal

## 2021-01-13 NOTE — PROGRESS NOTES
CM called patient's son. He's wants patient to return home with [address]. CM called [facility] and faxed the order. CM following.

## 2021-01-13 NOTE — DISCHARGE INSTRUCTIONS
DISCHARGE SUMMARY from Nurse    PATIENT INSTRUCTIONS:    After general anesthesia or intravenous sedation, for 24 hours or while taking prescription Narcotics:  · Limit your activities  · Do not drive and operate hazardous machinery  · Do not make important personal or business decisions  · Do  not drink alcoholic beverages  · If you have not urinated within 8 hours after discharge, please contact your surgeon on call. What to do at Home:  Recommended activity: Activity as tolerated. If you experience any of the following symptoms worsening cough or wheezing, shortness of breath or fatigue not relieved with rest, unrelieved pain, nausea or vomiting, please follow up with MD.    *  Please give a list of your current medications to your Primary Care Provider. *  Please update this list whenever your medications are discontinued, doses are      changed, or new medications (including over-the-counter products) are added. *  Please carry medication information at all times in case of emergency situations. These are general instructions for a healthy lifestyle:    No smoking/ No tobacco products/ Avoid exposure to second hand smoke  Surgeon General's Warning:  Quitting smoking now greatly reduces serious risk to your health. Obesity, smoking, and sedentary lifestyle greatly increases your risk for illness    A healthy diet, regular physical exercise & weight monitoring are important for maintaining a healthy lifestyle    You may be retaining fluid if you have a history of heart failure or if you experience any of the following symptoms:  Weight gain of 3 pounds or more overnight or 5 pounds in a week, increased swelling in our hands or feet or shortness of breath while lying flat in bed. Please call your doctor as soon as you notice any of these symptoms; do not wait until your next office visit. The discharge information has been reviewed with the patient.   The patient verbalized understanding. Discharge medications reviewed with the patient and appropriate educational materials and side effects teaching were provided. Advance Care Planning  People with COVID-19 may have no symptoms, mild symptoms, such as fever, cough, and shortness of breath or they may have more severe illness, developing severe and fatal pneumonia. As a result, Advance Care Planning with attention to naming a health care decision maker (someone you trust to make healthcare decisions for you if you could not speak for yourself) and sharing other health care preferences is important BEFORE a possible health crisis. Please contact your Primary Care Provider to discuss Advance Care Planning. Preventing the Spread of Coronavirus Disease 2019 in Homes and Residential Communities  For the most recent information go to [email or web address]    Prevention steps for People with confirmed or suspected COVID-19 (including persons under investigation) who do not need to be hospitalized  and   People with confirmed COVID-19 who were hospitalized and determined to be medically stable to go home    Your healthcare provider and public health staff will evaluate whether you can be cared for at home. If it is determined that you do not need to be hospitalized and can be isolated at home, you will be monitored by staff from your local or state health department. You should follow the prevention steps below until a healthcare provider or local or state health department says you can return to your normal activities. Stay home except to get medical care  People who are mildly ill with COVID-19 are able to isolate at home during their illness. You should restrict activities outside your home, except for getting medical care. Do not go to work, school, or public areas. Avoid using public transportation, ride-sharing, or taxis.   Separate yourself from other people and animals in your home  People: As much as possible, you should stay in a specific room and away from other people in your home. Also, you should use a separate bathroom, if available. Animals: You should restrict contact with pets and other animals while you are sick with COVID-19, just like you would around other people. Although there have not been reports of pets or other animals becoming sick with COVID-19, it is still recommended that people sick with COVID-19 limit contact with animals until more information is known about the virus. When possible, have another member of your household care for your animals while you are sick. If you are sick with COVID-19, avoid contact with your pet, including petting, snuggling, being kissed or licked, and sharing food. If you must care for your pet or be around animals while you are sick, wash your hands before and after you interact with pets and wear a facemask. Call ahead before visiting your doctor  If you have a medical appointment, call the healthcare provider and tell them that you have or may have COVID-19. This will help the healthcare providers office take steps to keep other people from getting infected or exposed. Wear a facemask  You should wear a facemask when you are around other people (e.g., sharing a room or vehicle) or pets and before you enter a healthcare providers office. If you are not able to wear a facemask (for example, because it causes trouble breathing), then people who live with you should not stay in the same room with you, or they should wear a facemask if they enter your room. Cover your coughs and sneezes  Cover your mouth and nose with a tissue when you cough or sneeze. Throw used tissues in a lined trash can. Immediately wash your hands with soap and water for at least 20 seconds or, if soap and water are not available, clean your hands with an alcohol-based hand  that contains at least 60% alcohol.   Clean your hands often  Wash your hands often with soap and water for at least 20 seconds, especially after blowing your nose, coughing, or sneezing; going to the bathroom; and before eating or preparing food. If soap and water are not readily available, use an alcohol-based hand  with at least 60% alcohol, covering all surfaces of your hands and rubbing them together until they feel dry. Soap and water are the best option if hands are visibly dirty. Avoid touching your eyes, nose, and mouth with unwashed hands. Avoid sharing personal household items  You should not share dishes, drinking glasses, cups, eating utensils, towels, or bedding with other people or pets in your home. After using these items, they should be washed thoroughly with soap and water. Clean all high-touch surfaces everyday  High touch surfaces include counters, tabletops, doorknobs, bathroom fixtures, toilets, phones, keyboards, tablets, and bedside tables. Also, clean any surfaces that may have blood, stool, or body fluids on them. Use a household cleaning spray or wipe, according to the label instructions. Labels contain instructions for safe and effective use of the cleaning product including precautions you should take when applying the product, such as wearing gloves and making sure you have good ventilation during use of the product. Monitor your symptoms  Seek prompt medical attention if your illness is worsening (e.g., difficulty breathing). Before seeking care, call your healthcare provider and tell them that you have, or are being evaluated for, COVID-19. Put on a facemask before you enter the facility. These steps will help the healthcare providers office to keep other people in the office or waiting room from getting infected or exposed. Ask your healthcare provider to call the local or [facility] health department.  Persons who are placed under active monitoring or facilitated self-monitoring should follow instructions provided by their local health department or occupational health professionals, as appropriate. When working with your local health department check their available hours. If you have a medical emergency and need to call 911, notify the dispatch personnel that you have, or are being evaluated for COVID-19. If possible, put on a facemask before emergency medical services arrive. Discontinuing home isolation  Patients with confirmed COVID-19 should remain under home isolation precautions until the risk of secondary transmission to others is thought to be low. The decision to discontinue home isolation precautions should be made on a case-by-case basis, in consultation with healthcare providers and state and local health departments. Patient Education          _Patient Education        Hospice: Care Instructions  Your Care Instructions  Hospice care provides medical treatment to relieve symptoms at the end of life. The goal is to keep you comfortable, not to try to cure you. Hospice care does not speed up or lengthen dying. It focuses on easing pain and other symptoms. Hospice caregivers want to enhance your quality of life. Hospice care also offers emotional help and spiritual support when you are dying. It also helps family members care for a loved one who is dying. Hospice care can help you review your life, say important things to family and friends, and explore spiritual issues. Hospice also helps your family and friends deal with their grief after you die. You can use hospice care if your illness cannot be cured and doctors believe you have no more than 6 months to live. You do not need to be confined to a bed or in a hospital to benefit from this type of care. The hospice team includes nurses, counselors, therapists, social workers, pastors, home health aides, and trained volunteers. You can get care in your own home or in a hospice center. Some hospice workers also go to nursing homes or hospitals.   How can you care for yourself at home?  · Prepare a list of advance directives. These are instructions to your doctor and family members about what kind of care you want if you become unable to speak or express yourself. · Find out if your health insurance covers hospice care. · Find hospice programs in your area. People who can help include your doctor, your state health department, and your insurance company. · Decide what kinds of hospice services you want. It helps to know what you want before you enter a hospice program.  · Think about some questions when preparing for hospice care. ? Who do you want to make decisions about your medical care if you are not able to? Many people choose their spouse, child, or doctor. ? What are you most afraid of that might happen? You might be afraid of having pain or losing your independence. Let your hospice team know your fears. The team can help you deal with them. ? Where would you prefer to die? Choices include your home, a hospital, or a nursing home. ? Do you want to donate organs when you die? Make sure that your family clearly understands this. ? Do you want any Faith rites or practices to be done before you die? Let your hospice team know what you want. Where can you learn more? Go to http://www.gray.com/  Enter D683 in the search box to learn more about \"Hospice: Care Instructions. \"  Current as of: December 9, 2019               Content Version: 12.6  © 7554-3940 [employer]. Care instructions adapted under license by [employer] (which disclaims liability or warranty for this information). If you have questions about a medical condition or this instruction, always ask your healthcare professional. [address] any warranty or liability for your use of this information.        __________________________________________________________________________________________________________________________________

## 2021-01-13 NOTE — PROGRESS NOTES
Care Management Interventions  PCP Verified by CM: Yes  Transition of Care Consult (CM Consult): [place]: No  Reason Outside [place]: Patient already serviced by other home care/hospice agency  Current Support Network: [address] Follow Up Transport: Family  Freedom of Choice List was Provided with Basic Dialogue that Supports the Patient's Individualized Plan of Care/Goals, Treatment Preferences and Shares the Quality Data Associated with the Providers?: Yes  [employer] Information Provided?: No  Discharge Location  Discharge Placement: Home with hospice  Patient is discharging via [facility] with [address] [place] [address] hospice. CM spoke with son, Vidya Marrero who is agreeable to this plan. CM faxed the hospice order to [facility].

## 2021-01-13 NOTE — PROGRESS NOTES
Pt discharged home via ambulance IV removed white left in place per MD pt is alert and stable she does have a wet cough MD is aware. Pt has redness to bottom cream applied.  was called with all discharge instructions and follow up appointments she is leaving with all her belongings.

## 2024-10-07 NOTE — PROGRESS NOTES
----- Message from Jada Morales MD sent at 10/3/2024  5:23 AM CDT -----  F/u 6 weeks IN PERSON  Thank you!    Problem: Mobility Impaired (Adult and Pediatric)  Goal: *Acute Goals and Plan of Care (Insert Text)  STG:  (1.)Ms. Clemente Carlos will move from supine to sit and sit to supine , scoot up and down and roll side to side in bed with MAXIMAL ASSIST within 7 day(s). (2.)Ms. Clemente Carlos will transfer from bed to chair and chair to bed with MAXIMAL ASSIST using the least restrictive device within 7 day(s). (3.)Ms. Clemente Carlos will maintain seated balance and upright posture with SUPERVISION and no external support within 7 days. LTG:  (1.)Ms. Clemente Carlos will move from supine to sit and sit to supine , scoot up and down and roll side to side in bed with MODERATE ASSIST within 14 day(s). (2.)Ms. Clemente Carlos will transfer from bed to chair and chair to bed with MODERATE ASSIST using the least restrictive device within 14 day(s). (3.)Ms. Clemente Carlos will ambulate with MODERATE ASSIST for 10+ feet with the least restrictive device within 14 day(s).     ________________________________________________________________________________________________      PHYSICAL THERAPY: Daily Note, Treatment Day: 1st and AM 2/13/2017  INPATIENT: Hospital Day: 9  [place] / [place] / Product Type: Medicare /      NAME/AGE/GENDER: Helga Maynard is a 80 y.o. female              PRIMARY DIAGNOSIS: Nausea and vomiting, intractability of vomiting not specified, unspecified vomiting type [R11.2]  Rectal mass [K62.9] Septic shock ([facility] [address] Septic shock (HCC)  Procedure(s) (LRB):  ESOPHAGOGASTRODUODENOSCOPY (EGD) At bedside (N/A)  SIGMOIDOSCOPY FLEXIBLE at bedside (N/A)  ESOPHAGOGASTRODUODENAL (EGD) BIOPSY (N/A)  6 Days Post-Op  ICD-10: Treatment Diagnosis:       · Generalized Muscle Weakness (M62.81)  · Difficulty in walking, Not elsewhere classified (R26.2)  · Abnormal posture (R29.3)   Precaution/Allergies:  Aspirin and Pcn [penicillins]       ASSESSMENT:      Ms. Clemente Carlos is supine in bed with daughter in law present and happy to see therapy as patient is restless and wanting to sit up. Attempted supine exercises for legs with limited ability to follow commands and help needed. She sat up with a lot of help and had poor sitting balance with a posterior and left lean needing constant verbal and manual cues to correct. Had patient reach for her feet 5 times which did help with her balance. We attempted to stand 3 times in walker as she wanted to get to the chair. She was unable to stand effectively at all to safely get to the chair so had her lay back down. She is very weak and no real progress made. This section established at most recent assessment   PROBLEM LIST (Impairments causing functional limitations):  1. Decreased Strength  2. Decreased Transfer Abilities  3. Decreased Ambulation Ability/Technique  4. Decreased Balance  5. Decreased Activity Tolerance    INTERVENTIONS PLANNED: (Benefits and precautions of physical therapy have been discussed with the patient.)  1. Balance Exercise  2. Bed Mobility  3. Family Education  4. Gait Training  5. Therapeutic Activites  6. Therapeutic Exercise/Strengthening  7. Group Therapy      TREATMENT PLAN: Frequency/Duration: 5 times a week for duration of hospital stay  Rehabilitation Potential For Stated Goals: FAIR      RECOMMENDED REHABILITATION/EQUIPMENT: (at time of discharge pending progress): Continue Skilled Therapy and Rehab. HISTORY:   History of Present Injury/Illness (Reason for Referral):  Per H&P, \"82yoF with a PMH of HTN, syncope, mild dementia who was recently hospitalized at [facility] after a fall and was discharged to rehab. Presents to ER from her rehab with hypotension and given IVFs but required Levophed. Had a recent UTI with E. Coli and treated with antibiotics. She has had persistent cough since December. Creatinine was elevated 2.12 and CXR is notable for possible retrocardiac infiltrate.  GI consulted for N/V and EGD with severe erosive esophagitis, biopsy at EG junction, large hiatal hernia and mild gastroduodenitis. Flex sig with large hemorrhoids. Weaned off Levophed, diet advanced to clear liquids and moved to the floor. Placed on Opti-flow for shortness of breath. \"  Past Medical History/Comorbidities:   Ms. Verito Barrow  has a past medical history of Arrhythmia; CAD (coronary artery disease); Chronic pain; Gastrointestinal disorder; Gastrointestinal disorder; and GERD (gastroesophageal reflux disease). Ms. Verito Barrow  has a past surgical history that includes hysterectomy; breast surgery procedure unlisted; and flexible sigmoidoscopy (N/A, 2/7/2017). Social History/Living Environment:   Home Environment: Private residence  # Steps to Enter: 0  Wheelchair Ramp: Yes  One/Two Story Residence: One story  Living Alone: No  Support Systems: Family member(s)  Patient Expects to be Discharged to[de-identified] Rehabilitation facility  Current DME Used/Available at Home: Walker, rolling  Tub or Shower Type: Shower  Prior Level of Function/Work/Activity:  Ms. Verito Barrow required assistance for all bathing and dressing and was using a rolling walker for ambulation. Daughter states that pt also has R knee pain that limits her mobility and was receiving injections for pain. She was incontinent of B/B due to her limited mobility. Number of Personal Factors/Comorbidities that affect the Plan of Care:  Low PLOF  R knee pain  Supplemental O2 3+: HIGH COMPLEXITY   EXAMINATION:   Most Recent Physical Functioning:   Gross Assessment:                  Posture:     Balance:  Sitting - Static: Fair (occasional); Poor (constant support)  Sitting - Dynamic: Poor (constant support) Bed Mobility:  Supine to Sit: Maximum assistance; Total assistance  Sit to Supine: Total assistance  Wheelchair Mobility:     Transfers: N/A     Gait: N/A             Body Structures Involved:  1. Muscles Body Functions Affected:  1. Respiratory  2. Neuromusculoskeletal  3.  Movement Related Activities and Participation Affected:  1. General Tasks and Demands  2. Mobility  3. Self Care   Number of elements that affect the Plan of Care: 4+: HIGH COMPLEXITY   CLINICAL PRESENTATION:   Presentation: Evolving clinical presentation with changing clinical characteristics: MODERATE COMPLEXITY   CLINICAL DECISION MAKIN [facility] Inpatient Short Form  How much difficulty does the patient currently have. .. Unable A Lot A Little None   1. Turning over in bed (including adjusting bedclothes, sheets and blankets)? [ ] 1   [X] 2   [ ] 3   [ ] 4   2. Sitting down on and standing up from a chair with arms ( e.g., wheelchair, bedside commode, etc.)   [X] 1   [ ] 2   [ ] 3   [ ] 4   3. Moving from lying on back to sitting on the side of the bed? [ ] 1   [X] 2   [ ] 3   [ ] 4   How much help from another person does the patient currently need. .. Total A Lot A Little None   4. Moving to and from a bed to a chair (including a wheelchair)? [X] 1   [ ] 2   [ ] 3   [ ] 4   5. Need to walk in hospital room? [X] 1   [ ] 2   [ ] 3   [ ] 4   6. Climbing 3-5 steps with a railing? [X] 1   [ ] 2   [ ] 3   [ ] 4   © , Trustees of [address] [place] [address] [address] [address], under license to [employer]. All rights reserved    Score:  Initial: 8 Most Recent: X (Date: 17 )     Interpretation of Tool:  Represents activities that are increasingly more difficult (i.e. Bed mobility, Transfers, Gait).        Score 24 23 22-20 19-15 14-10 9-7 6       Modifier CH CI CJ CK CL CM CN         · Mobility - Walking and Moving Around:               - CURRENT STATUS:    CM - 80%-99% impaired, limited or restricted               - GOAL STATUS:           CL - 60%-79% impaired, limited or restricted               - D/C STATUS:                       ---------------To be determined---------------  [place] / Product Type: Medicare /       Medical Necessity:     · Patient is expected to demonstrate progress in strength, range of motion, balance and functional technique to decrease assistance required with bed mobility and transfers and improve safety during functional activity. Reason for Services/Other Comments:  · Patient continues to require present interventions due to patient's inability to tolerate and maintain upright sitting and complete functional mobility safely without assistance. .   Use of outcome tool(s) and clinical judgement create a POC that gives a: Questionable prediction of patient's progress: MODERATE COMPLEXITY                 TREATMENT:   (In addition to Assessment/Re-Assessment sessions the following treatments were rendered)   Pre-treatment Symptoms/Complaints:  \"can you just help me get to that chair\"  Pain: Initial:   Pain Intensity 1: 0  Post Session:  0/10      Therapeutic Activity: (    15 minutes): Therapeutic activities including Bed transfers, sitting balance on the EOB with constant cueing to help maintain some balance and attempt to stand 3 times into the walker with maximal assist x2 to improve mobility, strength and balance. Required moderate   to promote static and dynamic balance in sitting. Therapeutic Exercise: (10 Minutes):  Exercises per grid below to improve mobility, strength and coordination. Required moderate verbal and manual cues to promote proper body mechanics. Progressed complexity of movement as indicated.      Date:  2/13/17 Date:   Date:     Activity/Exercise Parameters Parameters Parameters   Supine AP 10x B     Supine heel slides 10x B     Seated forward reach 5x                                    Braces/Orthotics/Lines/Etc:   · IV  · white catheter  · O2 Device: Other (comment) (Optiflow)  Treatment/Session Assessment:    · Response to Treatment:  Pt was able to tolerate unsupported upright sitting for only a very short period of time before loosing her balnce  · Interdisciplinary Collaboration:  · Physical Therapy Assistant, Registered Nurse and Rehabilitation Attendant  · After treatment position/precautions:  · Supine in bed, Bed/Chair-wheels locked, Bed in low position, Call light within reach and Family at bedside  · Compliance with Program/Exercises: Will assess as treatment progresses. · Recommendations/Intent for next treatment session: \"Next visit will focus on advancements to more challenging activities and reduction in assistance provided\".   Total Treatment Duration:  PT Patient Time In/Time Out  Time In: 0950  Time Out: 1015     Jose Carlos Rojas PTA SPT  Dieter Griggs DPT
